# Patient Record
Sex: MALE | Race: WHITE | NOT HISPANIC OR LATINO | Employment: OTHER | URBAN - METROPOLITAN AREA
[De-identification: names, ages, dates, MRNs, and addresses within clinical notes are randomized per-mention and may not be internally consistent; named-entity substitution may affect disease eponyms.]

---

## 2017-01-03 ENCOUNTER — TELEPHONE (OUTPATIENT)
Dept: PAIN MEDICINE | Facility: CLINIC | Age: 72
End: 2017-01-03

## 2017-01-03 ENCOUNTER — OFFICE VISIT (OUTPATIENT)
Dept: PAIN MEDICINE | Facility: CLINIC | Age: 72
End: 2017-01-03
Payer: MEDICARE

## 2017-01-03 VITALS
HEART RATE: 56 BPM | TEMPERATURE: 98 F | DIASTOLIC BLOOD PRESSURE: 72 MMHG | SYSTOLIC BLOOD PRESSURE: 122 MMHG | WEIGHT: 130.94 LBS | HEIGHT: 72 IN | BODY MASS INDEX: 17.73 KG/M2 | RESPIRATION RATE: 17 BRPM

## 2017-01-03 DIAGNOSIS — F11.20 OPIOID TYPE DEPENDENCE, CONTINUOUS USE: Primary | ICD-10-CM

## 2017-01-03 PROCEDURE — 1125F AMNT PAIN NOTED PAIN PRSNT: CPT | Mod: S$GLB,,, | Performed by: PSYCHIATRY & NEUROLOGY

## 2017-01-03 PROCEDURE — 1159F MED LIST DOCD IN RCRD: CPT | Mod: S$GLB,,, | Performed by: PSYCHIATRY & NEUROLOGY

## 2017-01-03 PROCEDURE — 1157F ADVNC CARE PLAN IN RCRD: CPT | Mod: S$GLB,,, | Performed by: PSYCHIATRY & NEUROLOGY

## 2017-01-03 PROCEDURE — 99213 OFFICE O/P EST LOW 20 MIN: CPT | Mod: S$GLB,,, | Performed by: PSYCHIATRY & NEUROLOGY

## 2017-01-03 PROCEDURE — 1160F RVW MEDS BY RX/DR IN RCRD: CPT | Mod: S$GLB,,, | Performed by: PSYCHIATRY & NEUROLOGY

## 2017-01-03 PROCEDURE — 99999 PR PBB SHADOW E&M-EST. PATIENT-LVL III: CPT | Mod: PBBFAC,,, | Performed by: PSYCHIATRY & NEUROLOGY

## 2017-01-03 RX ORDER — ALPRAZOLAM 2 MG/1
2 TABLET ORAL 2 TIMES DAILY PRN
Qty: 60 TABLET | Refills: 0 | Status: SHIPPED | OUTPATIENT
Start: 2017-01-03 | End: 2017-02-01 | Stop reason: SDUPTHER

## 2017-01-03 RX ORDER — OXYCODONE HYDROCHLORIDE 30 MG/1
30 TABLET ORAL
Qty: 75 TABLET | Refills: 0 | Status: SHIPPED | OUTPATIENT
Start: 2017-01-03 | End: 2017-02-01 | Stop reason: SDUPTHER

## 2017-01-03 NOTE — MR AVS SNAPSHOT
[facility]  [address]  [facility] [address]  Phone: [phone]  Fax: [phone]                  Griffin Hall   1/3/2017 11:20 AM   Office Visit    Description:  Male : 1945   Provider:  Demetria Browne MD   Department:  [facility]           Reason for Visit     Follow-up                To Do List           Future Appointments        Provider Department Dept Phone    2017 11:40 AM Demetria Browne MD [facility] [phone]      Goals (5 Years of Data)     None       These Medications        Disp Refills Start End    oxycodone (ROXICODONE) 30 MG Tab 75 tablet 0 1/3/2017     Take 1 tablet (30 mg total) by mouth after meals as needed. - Oral    Pharmacy: [address] Ph #: [phone]       alprazolam (XANAX) 2 MG Tab 60 tablet 0 1/3/2017     Take 1 tablet (2 mg total) by mouth 2 (two) times daily as needed. - Oral    Pharmacy: [address] Ph #: [phone]         [facility] On Call     [facility] On Call Nurse Care Line -  Assistance  Registered nurses in the [facility] On Call Center provide clinical advisement, health education, appointment booking, and other advisory services.  Call for this free service at [phone].             Medications           Message regarding Medications     Verify the changes and/or additions to your medication regime listed below are the same as discussed with your clinician today.  If any of these changes or additions are incorrect, please notify your healthcare provider.             Verify that the below list of medications is an accurate representation of the medications you are currently taking.  If none reported, the list may be blank. If incorrect, please contact your healthcare provider. Carry this list with you in case of emergency.           Current Medications     albuterol  (VENTOLIN HFA) 90 mcg/actuation inhaler Inhale 2 puffs into the lungs every 6 (six) hours as needed for Wheezing.    alprazolam (XANAX) 2 MG Tab Take 1 tablet (2 mg total) by mouth 2 (two) times daily as needed.    aspirin (ECOTRIN) 81 MG EC tablet Take 1 tablet (81 mg total) by mouth once daily.    atorvastatin (LIPITOR) 20 MG tablet Take 1 tablet (20 mg total) by mouth once daily.    benazepril (LOTENSIN) 5 MG tablet Take 1 tablet (5 mg total) by mouth once daily.    buPROPion (WELLBUTRIN SR) 200 MG TbSR     clopidogrel (PLAVIX) 75 mg tablet Take 1 tablet (75 mg total) by mouth once daily.    fluticasone (FLONASE) 50 mcg/actuation nasal spray     furosemide (LASIX) 20 MG tablet Take 1 tablet (20 mg total) by mouth once daily.    gabapentin (NEURONTIN) 300 MG capsule Take 1 pill at before bed x 7 days, followed by 1 pill in morning and night for 7 days, followed by 1 pill morning afternoon and night    metoprolol succinate (TOPROL-XL) 50 MG 24 hr tablet Take 1 tablet (50 mg total) by mouth once daily.    oxycodone (ROXICODONE) 30 MG Tab Take 1 tablet (30 mg total) by mouth after meals as needed.           Clinical Reference Information           Vital Signs - Last Recorded  Most recent update: 1/3/2017 12:43 PM by Andres Stanley MA    BP Pulse Temp Resp Ht Wt    122/72 (!) 56 98.2 °F (36.8 °C) (Oral) 17 6' (1.829 m) 59.4 kg (130 lb 15.3 oz)    BMI                17.76 kg/m2          Blood Pressure          Most Recent Value    BP  122/72      Allergies as of 1/3/2017     Codeine      Immunizations Administered on Date of Encounter - 1/3/2017     None      Smoking Cessation     If you would like to quit smoking:   You may be eligible for free services if you are a [place] resident and started smoking cigarettes before September 1, 1988.  Call the Smoking Cessation Trust (SCT) toll free at (283) 750-1834 or (325) 310-5664.   Call 2-800-QUIT-NOW if you do not meet the above criteria.

## 2017-01-03 NOTE — TELEPHONE ENCOUNTER
----- Message from Juan Bolivar Jr. sent at 12/30/2016  8:54 AM CST -----  X_  1st Request  _  2nd Request  _  3rd Request    Please refill the medication(s) listed below. Please call the patient when the prescription(s) is ready for  at the phone number [phone]    Medication #1 oxycodone (ROXICODONE) 30 MG Tab    Medication #2     alprazolam (XANAX) 2 MG Tab 60 tablet 0 12/1/2016  No  Sig - Route: Take 1 tablet (2 mg total) by mouth 2 (two) times daily as needed. - Oral          Preferred Pharmacy:    [facility] [address] [place] [address]

## 2017-01-03 NOTE — PROGRESS NOTES
Outpatient Psychiatry Follow-Up Visit (MD/NP)    1/3/2017    Clinical Status of Patient:  Outpatient (Ambulatory)    Chief Complaint:  Griffin Hall is a 71 y.o. male who presents today for follow-up of opioid dependence.  Met with patient.      Interval History and Content of Current Session:  Interim Events/Subjective Report/Content of Current Session: Pt reports that he has been having a lot more pain lately because he ran out of his medications as he did not keep his last appointment with me. That he has been trying to decrease the dose and taper it as we discuss and though it has been difficult he has been able to do it. Talked about other medications options foe the withdrawals but pt does not want to change any thing else at this time.     Psychotherapy:  · Target symptoms: opioid dependence  · Why chosen therapy is appropriate versus another modality: relevant to diagnosis, patient responds to this modality  · Outcome monitoring methods: self-report, observation  · Therapeutic intervention type: behavior modifying psychotherapy, supportive psychotherapy  · Topics discussed/themes: stress related to medical comorbidities, difficulty managing affect in interpersonal relationships, building skills sets for symptom management, symptom recognition  · The patient's response to the intervention is accepting. The patient's progress toward treatment goals is not progressing.   · Duration of intervention: 30 minutes.    Review of Systems   · PSYCHIATRIC: Pertinant items are noted in the narrative.    Past Medical, Family and Social History: The patient's past medical, family and social history have been reviewed and updated as appropriate within the electronic medical record - see encounter notes.    Compliance: yes    Side effects: None    Risk Parameters:  Patient reports no suicidal ideation  Patient reports no homicidal ideation  Patient reports no self-injurious behavior  Patient reports no violent  behavior    Exam (detailed: at least 9 elements; comprehensive: all 15 elements)   Constitutional  Vitals:  Most recent vital signs, dated less than 90 days prior to this appointment, were reviewed.   Vitals:    01/03/17 1236   BP: 122/72   Pulse: (!) 56   Resp: 17   Temp: 98.2 °F (36.8 °C)   TempSrc: Oral   Height: 6' (1.829 m)        General:  age appropriate, casually dressed     Musculoskeletal  Muscle Strength/Tone:  no tremor, no tic   Gait & Station:  non-ataxic     Psychiatric  Speech:  no latency; no press   Mood & Affect:  anxious  congruent and appropriate   Thought Process:  normal and logical, goal-directed   Associations:  intact   Thought Content:  normal, no suicidality, no homicidality, delusions, or paranoia   Insight:  intact   Judgement: behavior is adequate to circumstances   Orientation:  grossly intact   Memory: intact for content of interview   Language: grossly intact   Attention Span & Concentration:  able to focus   Fund of Knowledge:  intact and appropriate to age and level of education     Assessment and Diagnosis   Status/Progress: Based on the examination today, the patient's problem(s) is/are inadequately controlled.  New problems have been presented today.   Co-morbidities are complicating management of the primary condition.  There are no active rule-out diagnoses for this patient at this time.       Impression: Pt is a 70 Y/O CM with PMHx sig for H/O head and neck cancer, chronic pain of both shoulders, cervical stenosis of spinal cord with         Opioid dependence in a controlled environment.  Benzodiazepine dependence  Unspecified anxiety, R/O BETTYE           Strengths and Liabilities: Strength: Patient accepts guidance/feedback, Strength: Patient is expressive/articulate., Strength: Patient is intelligent., Strength: Patient is motivated for change., Strength: Patient has positive support network., Strength: Patient has reasonable judgment.     Treatment Goals: Specify outcomes  written in observable, behavioral terms:   Anxiety: acquiring relapse prevention skills, eliminating all anxiety symptoms (SCL-90-R scores in normal range), reducing negative automatic thoughts, reducing physical symptoms of anxiety and reducing time spent worrying (<30 minutes/day)  Safety: to take medications only as prescribed by his MDs     Treatment Plan/Recommendations:   · Medication Management: Continue current medications. The risks and benefits of medication were discussed with the patient.  · Referral for further treatment to social work team for psychotherapy  · The treatment plan and follow up plan were reviewed with the patient.   · Pt reports that he has a very supportive family.  · That he will cont to f/u with Dr Mcmillan  · Will cont the Roxicodone 30 mg upto tid prn pain, will decrease quantity to# 75  · Cont the xanax 2 mg upto twice daily, pt reports it also helps as a muscle relaxant.  · Will cont to monitor  · Reviewed LA .  · Will cont to f/u with Dr Mcmillan.  · Discussed chronic pain rehabilitation.  · Discussed coping skills.   · Had UDS done recently.      Return to Clinic: 1 month

## 2017-01-31 ENCOUNTER — TELEPHONE (OUTPATIENT)
Dept: PAIN MEDICINE | Facility: CLINIC | Age: 72
End: 2017-01-31

## 2017-01-31 NOTE — TELEPHONE ENCOUNTER
Called and spoke with patient today, he states he's out of his medication and need to see Dr. Browne.  Mr. Bellk, schedule follow up appointment with Dr. Browne for tomorrow 2/1/17.

## 2017-02-01 ENCOUNTER — OFFICE VISIT (OUTPATIENT)
Dept: PAIN MEDICINE | Facility: CLINIC | Age: 72
End: 2017-02-01
Payer: MEDICARE

## 2017-02-01 VITALS
WEIGHT: 130.94 LBS | HEIGHT: 71 IN | TEMPERATURE: 98 F | BODY MASS INDEX: 18.33 KG/M2 | RESPIRATION RATE: 17 BRPM | DIASTOLIC BLOOD PRESSURE: 80 MMHG | SYSTOLIC BLOOD PRESSURE: 130 MMHG | HEART RATE: 59 BPM

## 2017-02-01 DIAGNOSIS — F11.20 OPIOID TYPE DEPENDENCE, CONTINUOUS USE: Primary | ICD-10-CM

## 2017-02-01 PROCEDURE — 1157F ADVNC CARE PLAN IN RCRD: CPT | Mod: S$GLB,,, | Performed by: PSYCHIATRY & NEUROLOGY

## 2017-02-01 PROCEDURE — 99999 PR PBB SHADOW E&M-EST. PATIENT-LVL III: CPT | Mod: PBBFAC,,, | Performed by: PSYCHIATRY & NEUROLOGY

## 2017-02-01 PROCEDURE — 1125F AMNT PAIN NOTED PAIN PRSNT: CPT | Mod: S$GLB,,, | Performed by: PSYCHIATRY & NEUROLOGY

## 2017-02-01 PROCEDURE — 1160F RVW MEDS BY RX/DR IN RCRD: CPT | Mod: S$GLB,,, | Performed by: PSYCHIATRY & NEUROLOGY

## 2017-02-01 PROCEDURE — 1159F MED LIST DOCD IN RCRD: CPT | Mod: S$GLB,,, | Performed by: PSYCHIATRY & NEUROLOGY

## 2017-02-01 PROCEDURE — 99213 OFFICE O/P EST LOW 20 MIN: CPT | Mod: S$GLB,,, | Performed by: PSYCHIATRY & NEUROLOGY

## 2017-02-01 RX ORDER — ALPRAZOLAM 2 MG/1
2 TABLET ORAL 2 TIMES DAILY PRN
Qty: 60 TABLET | Refills: 0 | Status: SHIPPED | OUTPATIENT
Start: 2017-02-01 | End: 2017-03-01 | Stop reason: SDUPTHER

## 2017-02-01 RX ORDER — OXYCODONE HYDROCHLORIDE 30 MG/1
30 TABLET ORAL
Qty: 75 TABLET | Refills: 0 | Status: SHIPPED | OUTPATIENT
Start: 2017-02-01 | End: 2017-03-01 | Stop reason: SDUPTHER

## 2017-02-01 NOTE — PROGRESS NOTES
Outpatient Psychiatry Follow-Up Visit (MD/NP)    2/1/2017    Clinical Status of Patient:  Outpatient (Ambulatory)    Chief Complaint:  Griffin Hall is a 71 y.o. male who presents today for follow-up of anxiety and opioid and benzodiazepine dependence.  Met with patient.      Interval History and Content of Current Session:  Interim Events/Subjective Report/Content of Current Session: Pt reports that he has been having a lot of pain on his neck and shoulders and that he had an MRI done recently. I have talked to him about making an appointment  with  Dr Mcmillan again to discuss pain management options. Pt reports that he has been taking his pain medications as prescribed. That with the increase in pain it has been difficult for him to not take more but he has managed. I will continue to get collateral from his daughter as needed.    Psychotherapy:  · Target symptoms: opioid dependence  · Why chosen therapy is appropriate versus another modality: relevant to diagnosis, patient responds to this modality  · Outcome monitoring methods: self-report, observation, checklist/rating scale  · Therapeutic intervention type: behavior modifying psychotherapy, supportive psychotherapy  · Topics discussed/themes: stress related to medical comorbidities, difficulty managing affect in interpersonal relationships, building skills sets for symptom management, symptom recognition  · The patient's response to the intervention is accepting. The patient's progress toward treatment goals is fair.   · Duration of intervention: 30 minutes.    Review of Systems   · PSYCHIATRIC: Pertinant items are noted in the narrative.    Past Medical, Family and Social History: The patient's past medical, family and social history have been reviewed and updated as appropriate within the electronic medical record - see encounter notes.    Compliance: yes    Side effects: None    Risk Parameters:  Patient reports no suicidal ideation  Patient reports no  "homicidal ideation  Patient reports no self-injurious behavior  Patient reports no violent behavior    Exam (detailed: at least 9 elements; comprehensive: all 15 elements)   Constitutional  Vitals:  Most recent vital signs, dated less than 90 days prior to this appointment, were reviewed.   Vitals:    02/01/17 1212   BP: 130/80   Pulse: (!) 59   Resp: 17   Temp: 97.9 °F (36.6 °C)   TempSrc: Oral   Weight: 59.4 kg (130 lb 15.3 oz)   Height: 5' 11" (1.803 m)        General:  age appropriate, casually dressed, neatly groomed     Musculoskeletal  Muscle Strength/Tone:  no tremor, no tic   Gait & Station:  non-ataxic     Psychiatric  Speech:  no latency; no press   Mood & Affect:  " in a lot of pain"  congruent and appropriate   Thought Process:  normal and logical, goal-directed   Associations:  intact   Thought Content:  normal, no suicidality, no homicidality, delusions, or paranoia   Insight:  intact   Judgement: behavior is adequate to circumstances   Orientation:  grossly intact   Memory: intact for content of interview   Language: grossly intact   Attention Span & Concentration:  able to focus   Fund of Knowledge:  intact and appropriate to age and level of education     Assessment and Diagnosis   Status/Progress: Based on the examination today, the patient's problem(s) is/are inadequately controlled.  New problems have been presented today.   Co-morbidities are complicating management of the primary condition.  There are no active rule-out diagnoses for this patient at this time.            Impression: Pt is a 72 Y/O CM with PMHx sig for H/O head and neck cancer, chronic pain of both shoulders, cervical stenosis of spinal cord with           Opioid dependence in a controlled environment.  Benzodiazepine dependence  Unspecified anxiety, R/O BETTYE              Strengths and Liabilities: Strength: Patient accepts guidance/feedback, Strength: Patient is expressive/articulate., Strength: Patient is intelligent., " Strength: Patient is motivated for change., Strength: Patient has positive support network., Strength: Patient has reasonable judgment.      Treatment Goals: Specify outcomes written in observable, behavioral terms:   Anxiety: acquiring relapse prevention skills, eliminating all anxiety symptoms (SCL-90-R scores in normal range), reducing negative automatic thoughts, reducing physical symptoms of anxiety and reducing time spent worrying (<30 minutes/day)  Safety: to take medications only as prescribed by his MDs      Treatment Plan/Recommendations:   · Medication Management: Continue current medications. The risks and benefits of medication were discussed with the patient.  · Referral for further treatment to social work team for psychotherapy  · The treatment plan and follow up plan were reviewed with the patient.   · Pt reports that he has a very supportive family.  · That he will cont to f/u with Dr Mcmillan  · Will cont the Roxicodone 30 mg upto tid prn pain, will decrease quantity to# 75  · Cont the xanax 2 mg upto twice daily, pt reports it also helps as a muscle relaxant.  · Will cont to monitor  · Reviewed LA .  · Will cont to f/u with Dr Mcmillan.  · Discussed chronic pain rehabilitation.  · Discussed coping skills.         Return to Clinic: 1 month

## 2017-02-01 NOTE — MR AVS SNAPSHOT
[facility]  [address]  [facility] [address]  Phone: [phone]  Fax: [phone]                  Griffin Hall   2017 11:20 AM   Office Visit    Description:  Male : 1945   Provider:  Demetria Browne MD   Department:  [facility]           Reason for Visit     Follow-up                To Do List           Future Appointments        Provider Department Dept Phone    2017 10:30 AM Mavis Mcmillan MD [facility] [phone]    3/1/2017 11:20 AM Demetria Browne MD [facility] [phone]      Goals (5 Years of Data)     None       These Medications        Disp Refills Start End    oxycodone (ROXICODONE) 30 MG Tab 75 tablet 0 2017     Take 1 tablet (30 mg total) by mouth after meals as needed. - Oral    Pharmacy: [address] Ph #: [phone]       alprazolam (XANAX) 2 MG Tab 60 tablet 0 2017     Take 1 tablet (2 mg total) by mouth 2 (two) times daily as needed. - Oral    Pharmacy: [address] Ph #: [phone]         [facility] On Call     [facility] On Call Nurse Care Line -  Assistance  Registered nurses in the [facility] On Call Center provide clinical advisement, health education, appointment booking, and other advisory services.  Call for this free service at [phone].             Medications           Message regarding Medications     Verify the changes and/or additions to your medication regime listed below are the same as discussed with your clinician today.  If any of these changes or additions are incorrect, please notify your healthcare provider.             Verify that the below list of medications is an accurate representation of the medications you are currently taking.  If none reported, the list may be blank. If incorrect, please contact your healthcare provider. Carry this  "list with you in case of emergency.           Current Medications     albuterol (VENTOLIN HFA) 90 mcg/actuation inhaler Inhale 2 puffs into the lungs every 6 (six) hours as needed for Wheezing.    alprazolam (XANAX) 2 MG Tab Take 1 tablet (2 mg total) by mouth 2 (two) times daily as needed.    aspirin (ECOTRIN) 81 MG EC tablet Take 1 tablet (81 mg total) by mouth once daily.    atorvastatin (LIPITOR) 20 MG tablet Take 1 tablet (20 mg total) by mouth once daily.    benazepril (LOTENSIN) 5 MG tablet Take 1 tablet (5 mg total) by mouth once daily.    buPROPion (WELLBUTRIN SR) 200 MG TbSR     clopidogrel (PLAVIX) 75 mg tablet Take 1 tablet (75 mg total) by mouth once daily.    fluticasone (FLONASE) 50 mcg/actuation nasal spray     furosemide (LASIX) 20 MG tablet Take 1 tablet (20 mg total) by mouth once daily.    gabapentin (NEURONTIN) 300 MG capsule Take 1 pill at before bed x 7 days, followed by 1 pill in morning and night for 7 days, followed by 1 pill morning afternoon and night    metoprolol succinate (TOPROL-XL) 50 MG 24 hr tablet Take 1 tablet (50 mg total) by mouth once daily.    oxycodone (ROXICODONE) 30 MG Tab Take 1 tablet (30 mg total) by mouth after meals as needed.           Clinical Reference Information           Vital Signs - Last Recorded  Most recent update: 2/1/2017 12:12 PM by Andres Stanley MA    BP Pulse Temp Resp Ht Wt    130/80 (!) 59 97.9 °F (36.6 °C) (Oral) 17 5' 11" (1.803 m) 59.4 kg (130 lb 15.3 oz)    BMI                18.26 kg/m2          Blood Pressure          Most Recent Value    BP  130/80      Allergies as of 2/1/2017     Codeine      Immunizations Administered on Date of Encounter - 2/1/2017     None      [facility] Sign-Up     Activating your [facility] account is as easy as 1-2-3!     1) Visit [email or web address], select Sign Up Now, enter this activation code and your date of birth, then select Next.  Activation code not generated  Current Patient Portal Status: Account disabled      2) " Create a username and password to use when you visit [facility] in the future and select a security question in case you lose your password and select Next.    3) Enter your e-mail address and click Sign Up!    Additional Information  If you have questions, please e-mail [email or web address] or call [phone] to talk to our [facility] staff. Remember, [facility] is NOT to be used for urgent needs. For medical emergencies, dial 911.         Smoking Cessation     If you would like to quit smoking:   You may be eligible for free services if you are a [place] resident and started smoking cigarettes before September 1, 1988.  Call the Smoking Cessation Trust (SCT) toll free at (151) 117-1142 or (687) 681-4690.   Call 4-491-QUIT-NOW if you do not meet the above criteria.

## 2017-02-02 ENCOUNTER — OFFICE VISIT (OUTPATIENT)
Dept: PAIN MEDICINE | Facility: CLINIC | Age: 72
End: 2017-02-02
Attending: ANESTHESIOLOGY
Payer: MEDICARE

## 2017-02-02 VITALS
DIASTOLIC BLOOD PRESSURE: 87 MMHG | HEIGHT: 71 IN | WEIGHT: 139.13 LBS | HEART RATE: 69 BPM | RESPIRATION RATE: 18 BRPM | TEMPERATURE: 98 F | SYSTOLIC BLOOD PRESSURE: 143 MMHG | BODY MASS INDEX: 19.48 KG/M2

## 2017-02-02 DIAGNOSIS — G89.29 CHRONIC PAIN OF BOTH SHOULDERS: ICD-10-CM

## 2017-02-02 DIAGNOSIS — F11.20 OPIOID TYPE DEPENDENCE, CONTINUOUS USE: ICD-10-CM

## 2017-02-02 DIAGNOSIS — M25.511 CHRONIC PAIN OF BOTH SHOULDERS: ICD-10-CM

## 2017-02-02 DIAGNOSIS — M48.02 CERVICAL STENOSIS OF SPINAL CANAL: Primary | ICD-10-CM

## 2017-02-02 DIAGNOSIS — M54.12 CERVICAL RADICULOPATHY: ICD-10-CM

## 2017-02-02 DIAGNOSIS — M25.512 CHRONIC PAIN OF BOTH SHOULDERS: ICD-10-CM

## 2017-02-02 PROCEDURE — 1125F AMNT PAIN NOTED PAIN PRSNT: CPT | Mod: S$GLB,,, | Performed by: ANESTHESIOLOGY

## 2017-02-02 PROCEDURE — 1157F ADVNC CARE PLAN IN RCRD: CPT | Mod: S$GLB,,, | Performed by: ANESTHESIOLOGY

## 2017-02-02 PROCEDURE — 99499 UNLISTED E&M SERVICE: CPT | Mod: S$PBB,,, | Performed by: ANESTHESIOLOGY

## 2017-02-02 PROCEDURE — 99999 PR PBB SHADOW E&M-EST. PATIENT-LVL III: CPT | Mod: PBBFAC,,, | Performed by: ANESTHESIOLOGY

## 2017-02-02 PROCEDURE — 1159F MED LIST DOCD IN RCRD: CPT | Mod: S$GLB,,, | Performed by: ANESTHESIOLOGY

## 2017-02-02 PROCEDURE — 99213 OFFICE O/P EST LOW 20 MIN: CPT | Mod: S$GLB,,, | Performed by: ANESTHESIOLOGY

## 2017-02-02 PROCEDURE — 1160F RVW MEDS BY RX/DR IN RCRD: CPT | Mod: S$GLB,,, | Performed by: ANESTHESIOLOGY

## 2017-02-02 RX ORDER — CARISOPRODOL 350 MG/1
TABLET ORAL
Refills: 0 | COMMUNITY
Start: 2017-01-11 | End: 2018-05-25

## 2017-02-02 RX ORDER — PREGABALIN 50 MG/1
CAPSULE ORAL
Qty: 90 CAPSULE | Refills: 6 | Status: SHIPPED | OUTPATIENT
Start: 2017-02-02 | End: 2017-10-24 | Stop reason: ALTCHOICE

## 2017-02-02 RX ORDER — METHYLPREDNISOLONE 4 MG/1
TABLET ORAL
COMMUNITY
Start: 2017-01-30 | End: 2017-04-18 | Stop reason: ALTCHOICE

## 2017-02-02 NOTE — PROGRESS NOTES
Chronic Pain - Follow Up     Referring Physician: Ayden Almonte MD     Chief Complaint: No chief complaint on file.         SUBJECTIVE: Disclaimer: This note has been generated using voice-recognition software. There may be typographical errors that have been missed during proof-reading  Interval History 2/2/2017:  Continues to f/u with Dr. Browne, had an injury and had MRI at OSH, but results not available today, but copies were given to Dr. Browne, waiting for them to be scanned into the system.   Gabapentin caused dizziness so this was discontinued before an effective dose could be found.  Has not tried Lyrica in the past.  Interval history 11/18/2016:  Since previous encounter the patient has been receiving oxycodone 30 mg tablets 3-4 per day from multiple different physicians, he has not had any interventions or any other adjuvant medications added to his pain regimen.  Additionally the patient has had a recent MI in June with a drug-eluting stent placed and is now on Plavix.  He continues to endorse neck pain with bilateral upper extremity radicular symptoms is his worst pain.  He has had a previous ACDF and has a CT scan of the cervical spine which shows moderate neuroforaminal stenosis at C4-5 which would correspond to his shoulder pain bilaterally.  He has not been evaluated by orthopedics.   he previously did not follow my recommendations and is still not under psychiatric treatment.    Initial encounter:     Griffin Hall presents to the clinic for the evaluation of neck pain. The pain started 15 years ago following cervical fusion and symptoms have been worsening.     Brief history: Patient reports a history squamous carcinoma of the neck in 2006 which was resected in [facility] he states that he received 6 weeks of radiation treatment prior to surgical resection but did not have any chemotherapy treatments and reports that he has not been in follow-up by oncology. he  "states that he has been getting all of his treatment from his family practitioner Dr. Barry Keene. He states that Dr. Saunders is retiring and sent him to a new practitioner although he does not recall the name of the new practitioner and he had one encounter with the practitioner who referred him to pain management to resume his opioid prescriptions. The patient has been taking oxycodone 30 mg tablets he states 3-4 per day and was being given on average 130 tablets per month. His last prescription was provided according to  on 5/26/2015, and he states that he is out of his medication. He could not account for the reason why he is out early although he offered multiple explanations "sometimes you lose some, sometimes you step on some, I'm not sure what happened in this case, but I assure you I am not a drug addict"      Pain Description:     The pain is located in the neck upper back area and radiates to the across the chest.      At BEST 5/10      At WORST  7/10 on the WORST day.      On average pain is rated as 4/10.      Today the pain is rated as 5/10     The pain is described as aching, burning, numbing, sharp, shooting, tight band and deep       Symptoms interfere with daily activity, sleeping and work.      Exacerbating factors: Standing, Bending, Touching, Coughing/Sneezing, Eating, Walking, Night Time, Morning, Extension, Flexing and Lifting.      Mitigating factors heat, laying down, medications, rest and medications and injections (although he did not specify which injections he has had in the past).      Patient denies urinary incontinence and bowel incontinence. Patient has been having decreased appetite treated with Megace     Patient denies any suicidal or homicidal ideations     Pain Medications:  Current:  Oxycodone 30mg 3-4 /day     Tried in Past:  NSAIDs -Never  TCA -Never  SNRI -Never  Anti-convulsants -gabapentin causes dizziness.  Muscle Relaxants -Never  Opioids-Never     Physical " Therapy/Home Exercise: no       report: Reviewed and consistent with medication use as prescribed.     Pain Procedures: ?     Chiropractor -never  Acupuncture - never  TENS unit -never  Spinal decompression -never  Joint replacement -never    CT cervical spine 6/16/2016:  Cervical spine CT without contrast:    Results: 2.5-mm axial images of the cervical spine were obtained without intravenous contrast.  Coronal and sagittal reconstructions were generated. Comparison of CT cervical spine 9/2/11.    Generalized osteopenia.  Prior ACDF hardware at C5-C6, without evidence of hardware or loosening.  There is slight levocurvature and straightening of the cervical lordosis similar to prior.  Chronic mild anterior wedge deformity of T1-T3.  No displaced fracture, dislocation or significant listhesis.  No prevertebral soft tissue swelling or paraspinal hematoma.    There is multi-level degenerative disc disease and uncovertebral and facet arthrosismost prominent at C4-5 and C6-7 levels.    C2-3: Posterior central disc protrusion resulting in mild acquired canal stenosis.  No significant neuroforaminal narrowing.  C3-4: Posterior disk osteophyte complex resulting in mild acquired canal stenosis.  Mild bilateral neuroforaminal narrowing, right greater than left.  C4-5: Posterior disc osteophyte complex asymmetric to the right resulting in mild acquired canal stenosis.  Moderate bilateral neural foraminal narrowing.  C5-6: Posterior disc osteophyte complex asymmetric to the right resulting in mild acquired canal stenosis.  Moderate bilateral neural foraminal narrowing.  C6-7: Posterior disc osteophyte complex resulting in minimal acquired canal stenosis.  Moderate right and mild left neuroforaminal narrowing.  C7-T1:   No significant spinal canal stenosis or neural foraminal narrowing.    The visualized lung apices show biapical pleural parenchymal scarring and paraseptal emphysematous change without evidence for  "pneumothorax. Skull base vascular calcifications are noted.  Atherosclerotic calcifications are noted at the bilateral carotid bifurcations and proximal ICAs.  The remainder of the soft tissue structures incidentally surveyed in this noncontrast cervical spine CT demonstrates no significant findings.   Impression        1.  No acute cervical spine abnormality.    2.  Cervical spondylosis as detailed above.           Social hx, family hx, pmhx reviewed     Allergies not on file     No current outpatient prescriptions on file.      No current facility-administered medications for this visit.         REVIEW OF SYSTEMS:     GENERAL:  Weight loss and decreased appetite  HEENT:  No recent changes in vision or hearing  NECK:  difficulty with swallowing.  RESPIRATORY: Negative for cough, wheezing or shortness of breath, patient denies any recent URI.  CARDIOVASCULAR: Negative for chest pain, leg swelling or palpitations.  GI: Negative for abdominal discomfort, blood in stools or black stools or change in bowel habits.  MUSCULOSKELETAL: See HPI.  SKIN: Negative for lesions, rash, and itching.  PSYCH:  Depression and anxiety treated with citalopram, Wellbutrin, alprazolam, no psychiatrist.  Patients sleep is disturbed secondary to pain.  HEMATOLOGY/LYMPHOLOGY: Negative for prolonged bleeding, bruising easily or swollen nodes. Patient is taking plavix for NINA associated with MI  ENDO: No history of diabetes or thyroid dysfunction  NEURO: No history of headaches, syncope, paralysis, seizures or tremors.  All other reviewed and negative other than HPI.     OBJECTIVE:    Visit Vitals    BP (!) 143/87    Pulse 69    Temp 97.8 °F (36.6 °C) (Oral)    Resp 18    Ht 5' 11" (1.803 m)    Wt 63.1 kg (139 lb 1.8 oz)    BMI 19.4 kg/m2       PHYSICAL EXAMINATION:    GENERAL: Well appearing, in no acute distress, alert and oriented x3.  PSYCH:  Mood and affect appropriate.  SKIN: Skin color, texture, turgor normal, no rashes or " lesions.  HEAD/FACE:  Normocephalic, atraumatic. Cranial nerves grossly intact.  NECK: Pain to palpation over the cervical paraspinous muscles. Spurling positive. Pain with neck flexion, extension, and lateral flexion, limited range of motion in the neck.   CV: RRR with palpation of the radial artery.  PULM: No evidence of respiratory difficulty, symmetric chest rise.  EXTREMITIES: Peripheral joint ROM is full and pain free without obvious instability or laxity in all four extremities. No deformities, edema, or skin discoloration. Good capillary refill.  MUSCULOSKELETAL: Shoulder provocative maneuvers are cause pain.   Bilateral upper extremity strength is normal and symmetric.  No atrophy or tone abnormalities are noted.  NEURO: Bilateral upper extremity coordination and muscle stretch reflexes are physiologic and symmetric.  Darren's negative. No clonus.  No loss of sensation is noted.  GAIT: Antalgic, ambulates without assistance      ASSESSMENT: 70 y.o. year old male with pain, consistent with      Encounter Diagnoses   Name Primary?    Cervical stenosis of spinal canal Yes    Cervical radiculopathy     Chronic pain of both shoulders     Opioid type dependence, continuous use       PLAN:        Continue opioid wean with Dr. Browne, continue f/u with Dr. Browne    D/C gabapentin    A prescription for Lyrica was provided, Take 1 pill at nightx 7 days, followed by 1 pill in morning and night for 7 days, followed by 1 pill morning afternoon and night     Continue topical compounded pain cream     F/u with cardiology regarding need for Plavix, has a drug eluting stent from MI in June    Once the patient is safely able to discontinue Plavix we will strongly encourage treatment with intervention and discontinuation of opioids     Will review recent imaging when put into EPIC system.     Greater than 25 minutes spent in total in todays visit with the patient, with more than half that time direct face to face  counseling and education with the patient today. We discussed the disease process, prognosis, treatment plan, and risks and benefits.      Mavis Mcmillan 02/02/2017

## 2017-02-02 NOTE — MR AVS SNAPSHOT
[facility]  [address]  [facility] [address]  Phone: [phone]  Fax: [phone]                  Griffin Hall   2017 10:30 AM   Office Visit    Description:  Male : 1945   Provider:  Mavis Mcmillan MD   Department:  [facility]           Reason for Visit     Neck Pain           Diagnoses this Visit        Comments    Cervical stenosis of spinal canal    -  Primary     Cervical radiculopathy         Chronic pain of both shoulders         Opioid type dependence, continuous use                To Do List           Future Appointments        Provider Department Dept Phone    3/1/2017 11:20 AM Demetria Browne MD [facility] [phone]    3/16/2017 1:30 PM Janis Loera NP [facility] [phone]      Goals (5 Years of Data)     None       These Medications        Disp Refills Start End    pregabalin (LYRICA) 50 MG capsule 90 capsule 6 2017     Take 1 pill at nightx 7 days, followed by 1 pill in morning and night for 7 days, followed by 1 pill morning afternoon and night    Pharmacy: [address] Ph #: [phone]         [facility] On Call     [facility] On Call Nurse Care Line -  Assistance  Registered nurses in the [facility] On Call Center provide clinical advisement, health education, appointment booking, and other advisory services.  Call for this free service at [phone].             Medications           Message regarding Medications     Verify the changes and/or additions to your medication regime listed below are the same as discussed with your clinician today.  If any of these changes or additions are incorrect, please notify your healthcare provider.        START taking these NEW medications        Refills    pregabalin (LYRICA) 50 MG capsule 6    Sig: Take 1 pill at nightx 7 days, followed by 1 pill in morning and night for 7 days, followed by  "1 pill morning afternoon and night    Class: Normal           Verify that the below list of medications is an accurate representation of the medications you are currently taking.  If none reported, the list may be blank. If incorrect, please contact your healthcare provider. Carry this list with you in case of emergency.           Current Medications     albuterol (VENTOLIN HFA) 90 mcg/actuation inhaler Inhale 2 puffs into the lungs every 6 (six) hours as needed for Wheezing.    alprazolam (XANAX) 2 MG Tab Take 1 tablet (2 mg total) by mouth 2 (two) times daily as needed.    aspirin (ECOTRIN) 81 MG EC tablet Take 1 tablet (81 mg total) by mouth once daily.    atorvastatin (LIPITOR) 20 MG tablet Take 1 tablet (20 mg total) by mouth once daily.    benazepril (LOTENSIN) 5 MG tablet Take 1 tablet (5 mg total) by mouth once daily.    buPROPion (WELLBUTRIN SR) 200 MG TbSR     carisoprodol (SOMA) 350 MG tablet     clopidogrel (PLAVIX) 75 mg tablet Take 1 tablet (75 mg total) by mouth once daily.    fluticasone (FLONASE) 50 mcg/actuation nasal spray     furosemide (LASIX) 20 MG tablet Take 1 tablet (20 mg total) by mouth once daily.    gabapentin (NEURONTIN) 300 MG capsule Take 1 pill at before bed x 7 days, followed by 1 pill in morning and night for 7 days, followed by 1 pill morning afternoon and night    methylPREDNISolone (MEDROL DOSEPACK) 4 mg tablet     metoprolol succinate (TOPROL-XL) 50 MG 24 hr tablet Take 1 tablet (50 mg total) by mouth once daily.    oxycodone (ROXICODONE) 30 MG Tab Take 1 tablet (30 mg total) by mouth after meals as needed.    pregabalin (LYRICA) 50 MG capsule Take 1 pill at nightx 7 days, followed by 1 pill in morning and night for 7 days, followed by 1 pill morning afternoon and night           Clinical Reference Information           Your Vitals Were     BP Pulse Temp Resp Height Weight    143/87 69 97.8 °F (36.6 °C) (Oral) 18 5' 11" (1.803 m) 63.1 kg (139 lb 1.8 oz)    BMI                " 19.4 kg/m2          Blood Pressure          Most Recent Value    BP  (!)  143/87      Allergies as of 2/2/2017     Codeine      Immunizations Administered on Date of Encounter - 2/2/2017     None      [facility] Sign-Up     Activating your [facility] account is as easy as 1-2-3!     1) Visit [email or web address], select Sign Up Now, enter this activation code and your date of birth, then select Next.  Activation code not generated  Current Patient Portal Status: Account disabled      2) Create a username and password to use when you visit [facility] in the future and select a security question in case you lose your password and select Next.    3) Enter your e-mail address and click Sign Up!    Additional Information  If you have questions, please e-mail [email or web address] or call [phone] to talk to our [facility] staff. Remember, [facility] is NOT to be used for urgent needs. For medical emergencies, dial 911.         Smoking Cessation     If you would like to quit smoking:   You may be eligible for free services if you are a [place] resident and started smoking cigarettes before September 1, 1988.  Call the Smoking Cessation Trust ([facility]) toll free at (659) 979-3727 or (197) 398-4995.   Call 2-836-QUIT-NOW if you do not meet the above criteria.            Language Assistance Services     ATTENTION: Language assistance services are available, free of charge. Please call [phone].      ATENCIÓN: Si habla español, tiene a rocha disposición servicios gratuitos de asistencia lingüística. Llame al [phone].     CHÚ Ý: N?u b?n nói Ti?ng Vi?t, có các d?ch v? h? tr? ngôn ng? mi?n phí dành cho b?n. G?i s? [phone].         [facility] complies with applicable Federal civil rights laws and does not discriminate on the basis of race, color, national origin, age, disability, or sex.

## 2017-03-01 ENCOUNTER — OFFICE VISIT (OUTPATIENT)
Dept: PAIN MEDICINE | Facility: CLINIC | Age: 72
End: 2017-03-01
Payer: MEDICARE

## 2017-03-01 VITALS
BODY MASS INDEX: 18.22 KG/M2 | TEMPERATURE: 98 F | HEIGHT: 72 IN | SYSTOLIC BLOOD PRESSURE: 111 MMHG | RESPIRATION RATE: 18 BRPM | WEIGHT: 134.5 LBS | DIASTOLIC BLOOD PRESSURE: 68 MMHG | HEART RATE: 67 BPM

## 2017-03-01 DIAGNOSIS — F11.20 OPIOID TYPE DEPENDENCE, CONTINUOUS USE: Primary | ICD-10-CM

## 2017-03-01 DIAGNOSIS — Z79.891 ENCOUNTER FOR LONG-TERM OPIATE ANALGESIC USE: Primary | ICD-10-CM

## 2017-03-01 DIAGNOSIS — Z79.891 ENCOUNTER FOR LONG-TERM OPIATE ANALGESIC USE: ICD-10-CM

## 2017-03-01 PROCEDURE — 99999 PR PBB SHADOW E&M-EST. PATIENT-LVL III: CPT | Mod: PBBFAC,,, | Performed by: PSYCHIATRY & NEUROLOGY

## 2017-03-01 PROCEDURE — 99213 OFFICE O/P EST LOW 20 MIN: CPT | Mod: PBBFAC | Performed by: PSYCHIATRY & NEUROLOGY

## 2017-03-01 PROCEDURE — 80307 DRUG TEST PRSMV CHEM ANLYZR: CPT

## 2017-03-01 PROCEDURE — 99213 OFFICE O/P EST LOW 20 MIN: CPT | Mod: S$PBB,,, | Performed by: PSYCHIATRY & NEUROLOGY

## 2017-03-01 RX ORDER — ALPRAZOLAM 2 MG/1
2 TABLET ORAL 2 TIMES DAILY PRN
Qty: 60 TABLET | Refills: 0 | Status: SHIPPED | OUTPATIENT
Start: 2017-03-01 | End: 2017-04-05 | Stop reason: SDUPTHER

## 2017-03-01 RX ORDER — OXYCODONE HYDROCHLORIDE 30 MG/1
30 TABLET ORAL
Qty: 90 TABLET | Refills: 0 | Status: SHIPPED | OUTPATIENT
Start: 2017-03-01 | End: 2017-03-30 | Stop reason: SDUPTHER

## 2017-03-01 NOTE — MR AVS SNAPSHOT
[facility]  [address]  [place] [address]  Phone: [phone]  Fax: [phone]                  Griffin Hall   3/1/2017 11:20 AM   Office Visit    Description:  Male : 1945   Provider:  Demetria Browne MD   Department:  [facility]           Reason for Visit     Follow-up                To Do List           Future Appointments        Provider Department Dept Phone    3/16/2017 1:30 PM Janis Loera NP [facility] [phone]    4/3/2017 11:20 AM Demetria Browne MD [facility] [phone]      Goals (5 Years of Data)     None       These Medications        Disp Refills Start End    oxycodone (ROXICODONE) 30 MG Tab 90 tablet 0 3/1/2017     Take 1 tablet (30 mg total) by mouth after meals as needed. - Oral    Pharmacy: [address] Ph #: [phone]       alprazolam (XANAX) 2 MG Tab 60 tablet 0 3/1/2017     Take 1 tablet (2 mg total) by mouth 2 (two) times daily as needed. - Oral    Pharmacy: [address] Ph #: [phone]         [facility] On Call     [facility] On Call Nurse Care Line -  Assistance  Registered nurses in the [facility] On Call Center provide clinical advisement, health education, appointment booking, and other advisory services.  Call for this free service at [phone].             Medications           Message regarding Medications     Verify the changes and/or additions to your medication regime listed below are the same as discussed with your clinician today.  If any of these changes or additions are incorrect, please notify your healthcare provider.             Verify that the below list of medications is an accurate representation of the medications you are currently taking.  If none reported, the list may be blank. If incorrect, please contact your healthcare provider. Carry this  list with you in case of emergency.           Current Medications     albuterol (VENTOLIN HFA) 90 mcg/actuation inhaler Inhale 2 puffs into the lungs every 6 (six) hours as needed for Wheezing.    alprazolam (XANAX) 2 MG Tab Take 1 tablet (2 mg total) by mouth 2 (two) times daily as needed.    aspirin (ECOTRIN) 81 MG EC tablet Take 1 tablet (81 mg total) by mouth once daily.    atorvastatin (LIPITOR) 20 MG tablet Take 1 tablet (20 mg total) by mouth once daily.    benazepril (LOTENSIN) 5 MG tablet Take 1 tablet (5 mg total) by mouth once daily.    buPROPion (WELLBUTRIN SR) 200 MG TbSR     carisoprodol (SOMA) 350 MG tablet     clopidogrel (PLAVIX) 75 mg tablet Take 1 tablet (75 mg total) by mouth once daily.    fluticasone (FLONASE) 50 mcg/actuation nasal spray     furosemide (LASIX) 20 MG tablet Take 1 tablet (20 mg total) by mouth once daily.    gabapentin (NEURONTIN) 300 MG capsule Take 1 pill at before bed x 7 days, followed by 1 pill in morning and night for 7 days, followed by 1 pill morning afternoon and night    methylPREDNISolone (MEDROL DOSEPACK) 4 mg tablet     metoprolol succinate (TOPROL-XL) 50 MG 24 hr tablet Take 1 tablet (50 mg total) by mouth once daily.    oxycodone (ROXICODONE) 30 MG Tab Take 1 tablet (30 mg total) by mouth after meals as needed.    pregabalin (LYRICA) 50 MG capsule Take 1 pill at nightx 7 days, followed by 1 pill in morning and night for 7 days, followed by 1 pill morning afternoon and night           Clinical Reference Information           Your Vitals Were     BP                   111/68           Blood Pressure          Most Recent Value    BP  111/68      Allergies as of 3/1/2017     Codeine      Immunizations Administered on Date of Encounter - 3/1/2017     None      [facility] Sign-Up     Activating your [facility] account is as easy as 1-2-3!     1) Visit [email or web address], select Sign Up Now, enter this activation code and your date of birth, then select Next.  Activation code  not generated  Current Patient Portal Status: Account disabled      2) Create a username and password to use when you visit [facility] in the future and select a security question in case you lose your password and select Next.    3) Enter your e-mail address and click Sign Up!    Additional Information  If you have questions, please e-mail [email or web address] or call [phone] to talk to our [facility] staff. Remember, [facility] is NOT to be used for urgent needs. For medical emergencies, dial 911.         Smoking Cessation     If you would like to quit smoking:   You may be eligible for free services if you are a [place] resident and started smoking cigarettes before September 1, 1988.  Call the Smoking Cessation Trust ([facility]) toll free at ([phone] 143-2638 or (222) 426-8064.   Call -706-QUIT-NOW if you do not meet the above criteria.            Language Assistance Services     ATTENTION: Language assistance services are available, free of charge. Please call [phone].      ATENCIÓN: Si habla español, tiene a rocha disposición servicios gratuitos de asistencia lingüística. Llame al [phone].     CHÚ Ý: N?u b?n nói Ti?ng Vi?t, có các d?ch v? h? tr? ngôn ng? mi?n phí dành cho b?n. G?i s? [phone].         [facility] complies with applicable Federal civil rights laws and does not discriminate on the basis of race, color, national origin, age, disability, or sex.

## 2017-03-01 NOTE — PROGRESS NOTES
"Outpatient Psychiatry Follow-Up Visit (MD/NP)    3/1/2017    Clinical Status of Patient:  Outpatient (Ambulatory)    Chief Complaint:  Griffin Hall is a 71 y.o. male who presents today for follow-up of anxiety and opioid and benzodiazepine dependence.  Met with patient.      Interval History and Content of Current Session:  Interim Events/Subjective Report/Content of Current Session: Pt reports that he is seeing Dr Marilyn Alfonso an Orthopedic surgeon, who he reports will send in an order for a PET Scan . That he is worried about the results of the Pet Scan , " I might have cancer again" but also does not want to worry his family about it so is waiting till he gets the results before he lets his family know what is going on. That he has many friends in [place] and they have been a good source of support to him. That he is still trying to stay busy. Wants to celebrate his birthday with his friends in [place] so might be making the trip there. No concerns at this time of abuse of his medications. Reviewed LA , he got a prescription for adderall.     Psychotherapy:  · Target symptoms: opioid dependence, benzodiazepine dependence  · Why chosen therapy is appropriate versus another modality: relevant to diagnosis, patient responds to this modality  · Outcome monitoring methods: self-report, observation, checklist/rating scale  · Therapeutic intervention type: behavior modifying psychotherapy, supportive psychotherapy  · Topics discussed/themes: stress related to medical comorbidities, difficulty managing affect in interpersonal relationships, building skills sets for symptom management, symptom recognition  · The patient's response to the intervention is accepting. The patient's progress toward treatment goals is fair.   · Duration of intervention: 30 minutes.    Review of Systems   · PSYCHIATRIC: Pertinant items are noted in the narrative.    Past Medical, Family and Social History: The patient's past medical, family " "and social history have been reviewed and updated as appropriate within the electronic medical record - see encounter notes.    Compliance: yes    Side effects: None    Risk Parameters:  Patient reports no suicidal ideation  Patient reports no homicidal ideation  Patient reports no self-injurious behavior  Patient reports no violent behavior    Exam (detailed: at least 9 elements; comprehensive: all 15 elements)   Constitutional  Vitals:  Most recent vital signs, dated less than 90 days prior to this appointment, were reviewed.   Vitals:    03/01/17 1145   BP: 111/68   Pulse: 67   Resp: 18   Temp: 97.8 °F (36.6 °C)   TempSrc: Oral   Weight: 61 kg (134 lb 7.7 oz)   Height: 6' (1.829 m)        General:  age appropriate, casually dressed, neatly groomed     Musculoskeletal  Muscle Strength/Tone:  no tremor, no tic   Gait & Station:  non-ataxic     Psychiatric  Speech:  no latency; no press   Mood & Affect:  " worried about Pet Scan"  congruent and appropriate   Thought Process:  normal and logical, goal-directed   Associations:  intact   Thought Content:  normal, no suicidality, no homicidality, delusions, or paranoia   Insight:  intact   Judgement: behavior is adequate to circumstances   Orientation:  grossly intact   Memory: intact for content of interview   Language: grossly intact   Attention Span & Concentration:  able to focus   Fund of Knowledge:  intact and appropriate to age and level of education     Assessment and Diagnosis   Status/Progress: Based on the examination today, the patient's problem(s) is/are inadequately controlled.  New problems have been presented today.   Co-morbidities are complicating management of the primary condition.  There are no active rule-out diagnoses for this patient at this time.            Impression: Pt is a 70 Y/O CM with PMHx sig for H/O head and neck cancer, chronic pain of both shoulders, cervical stenosis of spinal cord with           Opioid dependence in a controlled " environment.  Benzodiazepine dependence  Unspecified anxiety, R/O BETTYE              Strengths and Liabilities: Strength: Patient accepts guidance/feedback, Strength: Patient is expressive/articulate., Strength: Patient is intelligent., Strength: Patient is motivated for change., Strength: Patient has positive support network., Strength: Patient has reasonable judgment.      Treatment Goals: Specify outcomes written in observable, behavioral terms:   Anxiety: acquiring relapse prevention skills, eliminating all anxiety symptoms (SCL-90-R scores in normal range), reducing negative automatic thoughts, reducing physical symptoms of anxiety and reducing time spent worrying (<30 minutes/day)  Safety: to take medications only as prescribed by his MDs      Treatment Plan/Recommendations:   · Medication Management: Continue current medications. The risks and benefits of medication were discussed with the patient.  · Referral for further treatment to social work team for psychotherapy  · The treatment plan and follow up plan were reviewed with the patient.   · Pt reports that he has a very supportive family.  · That he will cont to f/u with Dr Mcmillan  · Will cont the Roxicodone 30 mg upto tid prn pain, will decrease quantity to# 90 Not able to taper at this time. Will cont to assess.   · Cont the xanax 2 mg upto twice daily, pt reports it also helps as a muscle relaxant.  · Will cont to monitor  · Reviewed LA .  · Discussed chronic pain rehabilitation.  · Discussed coping skills.         Return to Clinic: 1 month

## 2017-03-14 LAB
6MAM UR QL: NOT DETECTED
7AMINOCLONAZEPAM UR QL: NOT DETECTED
A-OH ALPRAZ UR QL: PRESENT
ALPRAZ UR QL: PRESENT
AMPHET UR QL SCN: NOT DETECTED
ANNOTATION COMMENT IMP: NORMAL
ANNOTATION COMMENT IMP: NORMAL
BARBITURATES UR QL: NOT DETECTED
BUPRENORPHINE UR QL: NOT DETECTED
BZE UR QL: NOT DETECTED
CARBOXYTHC UR QL: NOT DETECTED
CARISOPRODOL UR QL: PRESENT
CLONAZEPAM UR QL: NOT DETECTED
CODEINE UR QL: NOT DETECTED
CREAT UR-MCNC: 92.2 MG/DL (ref 20–400)
DIAZEPAM UR QL: NOT DETECTED
ETHYL GLUCURONIDE UR QL: NOT DETECTED
FENTANYL UR QL: NOT DETECTED
HYDROCODONE UR QL: NOT DETECTED
HYDROMORPHONE UR QL: NOT DETECTED
LORAZEPAM UR QL: NOT DETECTED
MDA UR QL: NOT DETECTED
MDEA UR QL: NOT DETECTED
MDMA UR QL: NOT DETECTED
MEPERIDINE UR QL: NOT DETECTED
METHADONE UR QL: NOT DETECTED
METHAMPHET UR QL: NOT DETECTED
MIDAZOLAM UR QL SCN: NOT DETECTED
MORPHINE UR QL: NOT DETECTED
NORBUPRENORPHINE UR QL CFM: NOT DETECTED
NORDIAZEPAM UR QL: NOT DETECTED
NORFENTANYL UR QL: NOT DETECTED
NORHYDROCODONE UR QL CFM: NOT DETECTED
NOROXYCODONE UR QL CFM: PRESENT
OXAZEPAM UR QL: NOT DETECTED
OXYCODONE UR QL: PRESENT
OXYMORPHONE UR QL: PRESENT
OXYMORPHONE UR QL: PRESENT
PATHOLOGY STUDY: NORMAL
PCP UR QL: NOT DETECTED
PHENTERMINE UR QL: NOT DETECTED
PPAA UR QL: NOT DETECTED
PROPOXYPH UR QL: NOT DETECTED
SERVICE CMNT-IMP: NORMAL
TAPENTADOL UR QL SCN: NOT DETECTED
TAPENTADOL UR QL SCN: NOT DETECTED
TEMAZEPAM UR QL: NOT DETECTED
TRAMADOL UR QL: NOT DETECTED
ZOLPIDEM UR QL: NOT DETECTED

## 2017-03-30 RX ORDER — OXYCODONE HYDROCHLORIDE 30 MG/1
30 TABLET ORAL
Qty: 90 TABLET | Refills: 0 | Status: SHIPPED | OUTPATIENT
Start: 2017-03-30 | End: 2017-04-05 | Stop reason: SDUPTHER

## 2017-04-04 ENCOUNTER — TELEPHONE (OUTPATIENT)
Dept: PAIN MEDICINE | Facility: CLINIC | Age: 72
End: 2017-04-04

## 2017-04-04 NOTE — TELEPHONE ENCOUNTER
----- Message from Lisette Maguire sent at 4/4/2017  2:37 PM CDT -----  x_  1st Request  _  2nd Request  _  3rd Request    Please refill the medication(s) listed below. Please call the patient when the prescription(s) is ready for  at the phone number [phone]    Medication #1    alprazolam (XANAX) 2 MG Tab 60 tablet 0 3/1/2017  No  Sig - Route: Take 1 tablet (2 mg total) by mouth 2 (two) times daily as needed. - Oral  Class: Print  Order: [id number]        Preferred Pharmacy:

## 2017-04-04 NOTE — TELEPHONE ENCOUNTER
Called and spoke with pt today about rescheduling appointment with Dr. Browne. Mr Isabel, states he's out of alprazolam (XANAX) 2 MG Tab and need to follow up with Dr. Browne.  Schedule appt on 4/5/17.

## 2017-04-05 ENCOUNTER — OFFICE VISIT (OUTPATIENT)
Dept: PAIN MEDICINE | Facility: CLINIC | Age: 72
End: 2017-04-05
Payer: MEDICARE

## 2017-04-05 VITALS
SYSTOLIC BLOOD PRESSURE: 116 MMHG | BODY MASS INDEX: 18.69 KG/M2 | TEMPERATURE: 98 F | HEART RATE: 70 BPM | RESPIRATION RATE: 18 BRPM | DIASTOLIC BLOOD PRESSURE: 70 MMHG | HEIGHT: 72 IN | WEIGHT: 138 LBS

## 2017-04-05 DIAGNOSIS — F11.20 OPIOID TYPE DEPENDENCE, CONTINUOUS USE: Primary | ICD-10-CM

## 2017-04-05 PROCEDURE — 99213 OFFICE O/P EST LOW 20 MIN: CPT | Mod: PBBFAC | Performed by: PSYCHIATRY & NEUROLOGY

## 2017-04-05 PROCEDURE — 99999 PR PBB SHADOW E&M-EST. PATIENT-LVL III: CPT | Mod: PBBFAC,,, | Performed by: PSYCHIATRY & NEUROLOGY

## 2017-04-05 PROCEDURE — 99213 OFFICE O/P EST LOW 20 MIN: CPT | Mod: S$PBB,,, | Performed by: PSYCHIATRY & NEUROLOGY

## 2017-04-05 RX ORDER — ALPRAZOLAM 2 MG/1
2 TABLET ORAL 2 TIMES DAILY PRN
Qty: 60 TABLET | Refills: 0 | Status: SHIPPED | OUTPATIENT
Start: 2017-04-05 | End: 2017-05-04 | Stop reason: SDUPTHER

## 2017-04-05 RX ORDER — OXYCODONE HYDROCHLORIDE 30 MG/1
30 TABLET ORAL
Qty: 90 TABLET | Refills: 0 | Status: SHIPPED | OUTPATIENT
Start: 2017-04-05 | End: 2017-04-05 | Stop reason: SDUPTHER

## 2017-04-05 RX ORDER — OXYCODONE HYDROCHLORIDE 30 MG/1
30 TABLET ORAL
Qty: 90 TABLET | Refills: 0 | Status: SHIPPED | OUTPATIENT
Start: 2017-04-05 | End: 2017-05-29 | Stop reason: SDUPTHER

## 2017-04-05 NOTE — MR AVS SNAPSHOT
[facility]  [address]  [facility] [address]  Phone: [phone]  Fax: [phone]                  Griffin Hall   2017 12:00 PM   Office Visit    Description:  Male : 1945   Provider:  Demetria Browne MD   Department:  [facility]           Reason for Visit     Follow-up                To Do List           Future Appointments        Provider Department Dept Phone    2017 11:00 AM Demetria Browne MD [facility] [phone]      Goals (5 Years of Data)     None       These Medications        Disp Refills Start End    alprazolam (XANAX) 2 MG Tab 60 tablet 0 2017     Take 1 tablet (2 mg total) by mouth 2 (two) times daily as needed. - Oral    Pharmacy: [address] Ph #: [phone]       oxycodone (ROXICODONE) 30 MG Tab 90 tablet 0 2017     Take 1 tablet (30 mg total) by mouth after meals as needed. To be filled on 17 - Oral    Pharmacy: [address] Ph #: [phone]         [facility]     [facility] On Call Nurse Care Line -  Assistance  Unless otherwise directed by your provider, please contact [facility], our nurse care line that is available for  assistance.     Registered nurses in the [facility] provide: appointment scheduling, clinical advisement, health education, and other advisory services.  Call: [phone] (toll free)               Medications           Message regarding Medications     Verify the changes and/or additions to your medication regime listed below are the same as discussed with your clinician today.  If any of these changes or additions are incorrect, please notify your healthcare provider.        CHANGE how you are taking these medications     Start Taking Instead of    oxycodone (ROXICODONE) 30 MG Tab oxycodone (ROXICODONE) 30 MG Tab    Dosage:   Take 1 tablet (30 mg total) by mouth after meals as needed. To be filled on 4/30/17 Dosage:  Take 1 tablet (30 mg total) by mouth after meals as needed.    Reason for Change:  Reorder            Verify that the below list of medications is an accurate representation of the medications you are currently taking.  If none reported, the list may be blank. If incorrect, please contact your healthcare provider. Carry this list with you in case of emergency.           Current Medications     albuterol (VENTOLIN HFA) 90 mcg/actuation inhaler Inhale 2 puffs into the lungs every 6 (six) hours as needed for Wheezing.    alprazolam (XANAX) 2 MG Tab Take 1 tablet (2 mg total) by mouth 2 (two) times daily as needed.    aspirin (ECOTRIN) 81 MG EC tablet Take 1 tablet (81 mg total) by mouth once daily.    atorvastatin (LIPITOR) 20 MG tablet Take 1 tablet (20 mg total) by mouth once daily.    benazepril (LOTENSIN) 5 MG tablet Take 1 tablet (5 mg total) by mouth once daily.    buPROPion (WELLBUTRIN SR) 200 MG TbSR     carisoprodol (SOMA) 350 MG tablet     clopidogrel (PLAVIX) 75 mg tablet TAKE 1 TABLET BY MOUTH EVERY DAY    fluticasone (FLONASE) 50 mcg/actuation nasal spray     furosemide (LASIX) 20 MG tablet Take 1 tablet (20 mg total) by mouth once daily.    gabapentin (NEURONTIN) 300 MG capsule Take 1 pill at before bed x 7 days, followed by 1 pill in morning and night for 7 days, followed by 1 pill morning afternoon and night    methylPREDNISolone (MEDROL DOSEPACK) 4 mg tablet     metoprolol succinate (TOPROL-XL) 50 MG 24 hr tablet Take 1 tablet (50 mg total) by mouth once daily.    oxycodone (ROXICODONE) 30 MG Tab Take 1 tablet (30 mg total) by mouth after meals as needed. To be filled on 4/30/17    pregabalin (LYRICA) 50 MG capsule Take 1 pill at nightx 7 days, followed by 1 pill in morning and night for 7 days, followed by 1 pill morning afternoon and night           Clinical Reference Information           Your Vitals Were      BP Pulse Temp Resp Height Weight    116/70 70 98.4 °F (36.9 °C) (Oral) 18 6' (1.829 m) 62.6 kg (138 lb 0.1 oz)    BMI                18.72 kg/m2          Blood Pressure          Most Recent Value    BP  116/70      Allergies as of 4/5/2017     Codeine      Immunizations Administered on Date of Encounter - 4/5/2017     None      [facility] Sign-Up     Activating your [facility] account is as easy as 1-2-3!     1) Visit [email or web address], select Sign Up Now, enter this activation code and your date of birth, then select Next.  Activation code not generated  Current Patient Portal Status: Account disabled      2) Create a username and password to use when you visit [facility] in the future and select a security question in case you lose your password and select Next.    3) Enter your e-mail address and click Sign Up!    Additional Information  If you have questions, please e-mail [email or web address] or call [phone] to talk to our [facility] staff. Remember, [facility] is NOT to be used for urgent needs. For medical emergencies, dial 911.         Smoking Cessation     If you would like to quit smoking:   You may be eligible for free services if you are a [place] resident and started smoking cigarettes before September 1, 1988.  Call the Smoking Cessation Trust ([facility]) toll free at ([phone] 320-9026 or (539) [phone].   Call 1-800-QUIT-NOW if you do not meet the above criteria.   Contact us via email: [email or web address]   View our website for more information: [email or web address]        Language Assistance Services     ATTENTION: Language assistance services are available, free of charge. Please call [phone].      ATENCIÓN: Si habla español, tiene a rocha disposición servicios gratuitos de asistencia lingüística. Llame al [phone].     CHÚ Ý: N?u b?n nói Ti?ng Vi?t, có các d?ch v? h? tr? ngôn ng? mi?n phí dành cho b?n. G?i s? [phone].         [facility] complies with  applicable Federal civil rights laws and does not discriminate on the basis of race, color, national origin, age, disability, or sex.

## 2017-04-05 NOTE — PROGRESS NOTES
Outpatient Psychiatry Follow-Up Visit (MD/NP)    4/5/2017    Clinical Status of Patient:  Outpatient (Ambulatory)    Chief Complaint:  Griffin Hall is a 72 y.o. male who presents today for follow-up of anxiety and opioid and benzodiazepine dependence.  Met with patient.      Interval History and Content of Current Session:  Interim Events/Subjective Report/Content of Current Session: Pt reports that he is  Still taking his medications asprescribed. His daughter was in the appointment with today. She rpeorts that her father has been having issues with chronic pain for many hyears and that he has been on the pain medications for that long. That she has not had any concerns for him that hr is taking more of the medications etc. That he has been compliant and she has not had any reasons to think otherwise. Pt rpeorts that he is still n a lot of pain and would like to see another MD in pain Management.     Psychotherapy:  · Target symptoms: opioid dependence, benzodiazepine dependence  · Why chosen therapy is appropriate versus another modality: relevant to diagnosis, patient responds to this modality  · Outcome monitoring methods: self-report, observation, checklist/rating scale  · Therapeutic intervention type: behavior modifying psychotherapy, supportive psychotherapy  · Topics discussed/themes: stress related to medical comorbidities, difficulty managing affect in interpersonal relationships, building skills sets for symptom management, symptom recognition  · The patient's response to the intervention is accepting. The patient's progress toward treatment goals is fair.   · Duration of intervention: 30 minutes.    Review of Systems   · PSYCHIATRIC: Pertinant items are noted in the narrative.    Past Medical, Family and Social History: The patient's past medical, family and social history have been reviewed and updated as appropriate within the electronic medical record - see encounter notes.    Compliance: yes    Side  "effects: None    Risk Parameters:  Patient reports no suicidal ideation  Patient reports no homicidal ideation  Patient reports no self-injurious behavior  Patient reports no violent behavior    Exam (detailed: at least 9 elements; comprehensive: all 15 elements)   Constitutional  Vitals:  Most recent vital signs, dated less than 90 days prior to this appointment, were reviewed.   Vitals:    04/05/17 1224   BP: 116/70   Pulse: 70   Resp: 18   Temp: 98.4 °F (36.9 °C)   TempSrc: Oral   Weight: 62.6 kg (138 lb 0.1 oz)   Height: 6' (1.829 m)        General:  age appropriate, casually dressed, neatly groomed     Musculoskeletal  Muscle Strength/Tone:  no tremor, no tic   Gait & Station:  non-ataxic     Psychiatric  Speech:  no latency; no press   Mood & Affect:  " worried about Pet Scan"  congruent and appropriate   Thought Process:  normal and logical, goal-directed   Associations:  intact   Thought Content:  normal, no suicidality, no homicidality, delusions, or paranoia   Insight:  intact   Judgement: behavior is adequate to circumstances   Orientation:  grossly intact   Memory: intact for content of interview   Language: grossly intact   Attention Span & Concentration:  able to focus   Fund of Knowledge:  intact and appropriate to age and level of education     Assessment and Diagnosis   Status/Progress: Based on the examination today, the patient's problem(s) is/are inadequately controlled.  New problems have been presented today.   Co-morbidities are complicating management of the primary condition.  There are no active rule-out diagnoses for this patient at this time.            Impression: Pt is a 70 Y/O CM with PMHx sig for H/O head and neck cancer, chronic pain of both shoulders, cervical stenosis of spinal cord with           Opioid dependence in a controlled environment.  Benzodiazepine dependence  Unspecified anxiety, R/O BETTYE              Strengths and Liabilities: Strength: Patient accepts " guidance/feedback, Strength: Patient is expressive/articulate., Strength: Patient is intelligent., Strength: Patient is motivated for change., Strength: Patient has positive support network., Strength: Patient has reasonable judgment.      Treatment Goals: Specify outcomes written in observable, behavioral terms:   Anxiety: acquiring relapse prevention skills, eliminating all anxiety symptoms (SCL-90-R scores in normal range), reducing negative automatic thoughts, reducing physical symptoms of anxiety and reducing time spent worrying (<30 minutes/day)  Safety: to take medications only as prescribed by his MDs      Treatment Plan/Recommendations:   · Medication Management: Continue current medications. The risks and benefits of medication were discussed with the patient.  · Referral for further treatment to social work team for psychotherapy  · The treatment plan and follow up plan were reviewed with the patient.   · Pt reports that he has a very supportive family.  · That he will cont to f/u with Dr Mcmillan  · Will cont the Roxicodone 30 mg upto tid prn pain, will decrease quantity to# 90 Not able to taper at this time. Will cont to assess.   · Cont the xanax 2 mg upto twice daily, pt reports it also helps as a muscle relaxant.  · Will cont to monitor  · Reviewed LA .  · Discussed chronic pain rehabilitation.  · Discussed coping skills.         Return to Clinic:2 months

## 2017-04-10 ENCOUNTER — TELEPHONE (OUTPATIENT)
Dept: FAMILY MEDICINE | Facility: CLINIC | Age: 72
End: 2017-04-10

## 2017-04-10 NOTE — TELEPHONE ENCOUNTER
Left message that  is out of the office till 04/19/17 that is as soon as she could see him. If he needs a pre-op sooner advised to schedule to MELISSA Carrillo or another provider in this practice.

## 2017-04-10 NOTE — TELEPHONE ENCOUNTER
----- Message from Janessa Kent sent at 4/10/2017 12:48 PM CDT -----  Contact: Patient  Type: Sooner appointment than  is able to schedule    When is the first available appointment?  4/19/17    What is the nature of the appointment? Preop for cataract surgery    What appointment type: Preop    Comments: Please call the patient at [phone].    Thanks!

## 2017-04-18 ENCOUNTER — HOSPITAL ENCOUNTER (OUTPATIENT)
Dept: RADIOLOGY | Facility: HOSPITAL | Age: 72
Discharge: HOME | End: 2017-04-18
Attending: NURSE PRACTITIONER
Payer: MEDICARE

## 2017-04-18 ENCOUNTER — OFFICE VISIT (OUTPATIENT)
Dept: FAMILY MEDICINE | Facility: CLINIC | Age: 72
End: 2017-04-18
Payer: MEDICARE

## 2017-04-18 VITALS
DIASTOLIC BLOOD PRESSURE: 68 MMHG | HEART RATE: 64 BPM | WEIGHT: 139.13 LBS | BODY MASS INDEX: 19.48 KG/M2 | TEMPERATURE: 98 F | SYSTOLIC BLOOD PRESSURE: 114 MMHG | HEIGHT: 71 IN

## 2017-04-18 DIAGNOSIS — I25.2 STATUS POST NON-ST ELEVATION MYOCARDIAL INFARCTION (NSTEMI): ICD-10-CM

## 2017-04-18 DIAGNOSIS — I50.9 HEART FAILURE, UNSPECIFIED HEART FAILURE CHRONICITY, UNSPECIFIED HEART FAILURE TYPE: ICD-10-CM

## 2017-04-18 DIAGNOSIS — Z95.5 S/P DRUG ELUTING CORONARY STENT PLACEMENT: ICD-10-CM

## 2017-04-18 DIAGNOSIS — Z01.818 PRE-OP EVALUATION: ICD-10-CM

## 2017-04-18 DIAGNOSIS — Z79.02 PLATELET INHIBITION DUE TO PLAVIX: ICD-10-CM

## 2017-04-18 DIAGNOSIS — Z72.0 TOBACCO USE: ICD-10-CM

## 2017-04-18 DIAGNOSIS — Z91.199 NON-COMPLIANCE: ICD-10-CM

## 2017-04-18 DIAGNOSIS — I50.22 CHRONIC SYSTOLIC HEART FAILURE: ICD-10-CM

## 2017-04-18 DIAGNOSIS — Z01.818 PRE-OP EVALUATION: Primary | ICD-10-CM

## 2017-04-18 PROCEDURE — 71020 XR CHEST PA AND LATERAL: CPT | Mod: TC,PO

## 2017-04-18 PROCEDURE — 99214 OFFICE O/P EST MOD 30 MIN: CPT | Mod: S$PBB,,, | Performed by: NURSE PRACTITIONER

## 2017-04-18 PROCEDURE — 99999 PR PBB SHADOW E&M-EST. PATIENT-LVL V: CPT | Mod: PBBFAC,,, | Performed by: NURSE PRACTITIONER

## 2017-04-18 PROCEDURE — 93010 ELECTROCARDIOGRAM REPORT: CPT | Mod: ,,, | Performed by: INTERNAL MEDICINE

## 2017-04-18 PROCEDURE — 99499 UNLISTED E&M SERVICE: CPT | Mod: S$PBB,,, | Performed by: NURSE PRACTITIONER

## 2017-04-18 PROCEDURE — 71020 XR CHEST PA AND LATERAL: CPT | Mod: 26,,, | Performed by: RADIOLOGY

## 2017-04-18 RX ORDER — FUROSEMIDE 20 MG/1
20 TABLET ORAL DAILY
Qty: 30 TABLET | Refills: 11 | Status: SHIPPED | OUTPATIENT
Start: 2017-04-18 | End: 2017-06-22 | Stop reason: SDUPTHER

## 2017-04-18 RX ORDER — DEXTROAMPHETAMINE SACCHARATE, AMPHETAMINE ASPARTATE, DEXTROAMPHETAMINE SULFATE AND AMPHETAMINE SULFATE 2.5; 2.5; 2.5; 2.5 MG/1; MG/1; MG/1; MG/1
TABLET ORAL DAILY
Refills: 0 | COMMUNITY
Start: 2017-02-24 | End: 2018-01-16

## 2017-04-18 NOTE — MR AVS SNAPSHOT
[facility]  [address], [address]  [facility] [address]  Phone: [phone]  Fax: [phone]                  Griffin Hall   2017 10:40 AM   Office Visit    Description:  Male : 1945   Provider:  SABA Marrero   Department:  [facility]           Reason for Visit     Pre-op Exam     Medication Refill           Diagnoses this Visit        Comments    Pre-op evaluation    -  Primary     Status post non-ST elevation myocardial infarction (NSTEMI)         S/P drug eluting coronary stent placement         Heart failure, unspecified heart failure chronicity, unspecified heart failure type         Platelet inhibition due to Plavix         Chronic systolic heart failure         Tobacco use         Non-compliance                To Do List           Future Appointments        Provider Department Dept Phone    2017 11:00 AM Demetria Browne MD [facility] - Pain Management [phone]      Goals (5 Years of Data)     None       These Medications        Disp Refills Start End    furosemide (LASIX) 20 MG tablet 30 tablet 11 2017    Take 1 tablet (20 mg total) by mouth once daily. - Oral    Pharmacy: [address] Ph #: [phone]         [facility]     [facility] On Call Nurse Care Line -  Assistance  Unless otherwise directed by your provider, please contact [facility], our nurse care line that is available for  assistance.     Registered nurses in the [facility] Center provide: appointment scheduling, clinical advisement, health education, and other advisory services.  Call: [phone] (toll free)               Medications           Message regarding Medications     Verify the changes and/or additions to your medication regime listed below are the same as discussed with your clinician today.  If any of these changes or additions are incorrect,  please notify your healthcare provider.        STOP taking these medications     methylPREDNISolone (MEDROL DOSEPACK) 4 mg tablet            Verify that the below list of medications is an accurate representation of the medications you are currently taking.  If none reported, the list may be blank. If incorrect, please contact your healthcare provider. Carry this list with you in case of emergency.           Current Medications     albuterol (VENTOLIN HFA) 90 mcg/actuation inhaler Inhale 2 puffs into the lungs every 6 (six) hours as needed for Wheezing.    alprazolam (XANAX) 2 MG Tab Take 1 tablet (2 mg total) by mouth 2 (two) times daily as needed.    aspirin (ECOTRIN) 81 MG EC tablet Take 1 tablet (81 mg total) by mouth once daily.    atorvastatin (LIPITOR) 20 MG tablet Take 1 tablet (20 mg total) by mouth once daily.    benazepril (LOTENSIN) 5 MG tablet Take 1 tablet (5 mg total) by mouth once daily.    buPROPion (WELLBUTRIN SR) 200 MG TbSR     carisoprodol (SOMA) 350 MG tablet     clopidogrel (PLAVIX) 75 mg tablet TAKE 1 TABLET BY MOUTH EVERY DAY    dextroamphetamine-amphetamine 10 mg Tab once daily.    fluticasone (FLONASE) 50 mcg/actuation nasal spray     furosemide (LASIX) 20 MG tablet Take 1 tablet (20 mg total) by mouth once daily.    gabapentin (NEURONTIN) 300 MG capsule Take 1 pill at before bed x 7 days, followed by 1 pill in morning and night for 7 days, followed by 1 pill morning afternoon and night    metoprolol succinate (TOPROL-XL) 50 MG 24 hr tablet Take 1 tablet (50 mg total) by mouth once daily.    oxycodone (ROXICODONE) 30 MG Tab Take 1 tablet (30 mg total) by mouth after meals as needed. To be filled on 4/30/17    pregabalin (LYRICA) 50 MG capsule Take 1 pill at nightx 7 days, followed by 1 pill in morning and night for 7 days, followed by 1 pill morning afternoon and night           Clinical Reference Information           Your Vitals Were     BP Pulse Temp Height Weight BMI    114/68 (BP  "Location: Right arm) 64 98.3 °F (36.8 °C) 5' 11" (1.803 m) 63.1 kg (139 lb 1.8 oz) 19.4 kg/m2      Blood Pressure          Most Recent Value    BP  114/68      Allergies as of 4/18/2017     Codeine      Immunizations Administered on Date of Encounter - 4/18/2017     None      Orders Placed During Today's Visit      Normal Orders This Visit    Ambulatory referral to Smoking Cessation Program     EKG 12-lead     Urinalysis     Future Labs/Procedures Expected by Expires    APTT  4/18/2017 6/17/2018    Brain natriuretic peptide  4/18/2017 6/17/2018    CBC auto differential  4/18/2017 6/17/2018    Comprehensive metabolic panel  4/18/2017 6/17/2018    Protime-INR  4/18/2017 6/17/2018    X-Ray Chest PA And Lateral  4/18/2017 4/18/2018      [facility] Sign-Up     Activating your [facility] account is as easy as 1-2-3!     1) Visit [email or web address], select Sign Up Now, enter this activation code and your date of birth, then select Next.  Activation code not generated  Current Patient Portal Status: Account disabled      2) Create a username and password to use when you visit [facility] in the future and select a security question in case you lose your password and select Next.    3) Enter your e-mail address and click Sign Up!    Additional Information  If you have questions, please e-mail [email or web address] or call [phone] to talk to our [facility] staff. Remember, [facility] is NOT to be used for urgent needs. For medical emergencies, dial 911.         Instructions    Labs and chest xray today    You will need clearance from your heart doctor    David zamudio resume taking today    You are not cleared for your surgery from us           Smoking Cessation     If you would like to quit smoking:   You may be eligible for free services if you are a [place] resident and started smoking cigarettes before September 1, 1988.  Call the Smoking Cessation Trust (SCT) toll free at (540) 159-6386 or (257) 737-3755.   Call " 1-800-QUIT-NOW if you do not meet the above criteria.   Contact us via email: [email or web address]   View our website for more information: [email or web address]        Language Assistance Services     ATTENTION: Language assistance services are available, free of charge. Please call [phone].      ATENCIÓN: Si habla español, tiene a rocha disposición servicios gratuitos de asistencia lingüística. Llame al [phone].     CHÚ Ý: N?u b?n nói Ti?ng Vi?t, có các d?ch v? h? tr? ngôn ng? mi?n phí dành cho b?n. G?i s? [phone].         [facility] complies with applicable Federal civil rights laws and does not discriminate on the basis of race, color, national origin, age, disability, or sex.

## 2017-04-18 NOTE — PATIENT INSTRUCTIONS
Labs and chest xray today    You will need clearance from your heart doctor    David zamudio resume taking today    You are not cleared for your surgery from us

## 2017-04-18 NOTE — PROGRESS NOTES
"Subjective:       Patient ID: Griffin Hall is a 72 y.o. male.    Chief Complaint: Pre-op Exam (bilateral cataract eye surgery) and Medication Refill (lasix)    HPI Comments: Pt here for pre op clearance for cataract surgery, also needs RF on Lasix.  Pt states that he has not taken his Lasix in a while      Medication Refill   Associated symptoms include coughing. Pertinent negatives include no chest pain, chills, fatigue or fever.     Past Medical History:   Diagnosis Date    Anxiety     Degenerative disc disease     Depression     Tobacco use 6/16/2016     Past Surgical History:   Procedure Laterality Date    CERVICAL FUSION N/A     THROAT SURGERY       Social History     Social History Narrative    From [facility]     Family History   Problem Relation Age of Onset    Cancer Mother     Cancer Father     Cancer Maternal Aunt     Cancer Maternal Uncle     Cancer Paternal Uncle      Vitals:    04/18/17 1104   BP: 114/68   Pulse: 64   Temp: 98.3 °F (36.8 °C)   Weight: 63.1 kg (139 lb 1.8 oz)   Height: 5' 11" (1.803 m)   PainSc:   7     Outpatient Encounter Prescriptions as of 4/18/2017   Medication Sig Dispense Refill    albuterol (VENTOLIN HFA) 90 mcg/actuation inhaler Inhale 2 puffs into the lungs every 6 (six) hours as needed for Wheezing. 1 Inhaler 6    alprazolam (XANAX) 2 MG Tab Take 1 tablet (2 mg total) by mouth 2 (two) times daily as needed. 60 tablet 0    aspirin (ECOTRIN) 81 MG EC tablet Take 1 tablet (81 mg total) by mouth once daily. 90 tablet 3    atorvastatin (LIPITOR) 20 MG tablet Take 1 tablet (20 mg total) by mouth once daily. 90 tablet 3    benazepril (LOTENSIN) 5 MG tablet Take 1 tablet (5 mg total) by mouth once daily. 90 tablet 3    buPROPion (WELLBUTRIN SR) 200 MG TbSR       carisoprodol (SOMA) 350 MG tablet   0    clopidogrel (PLAVIX) 75 mg tablet TAKE 1 TABLET BY MOUTH EVERY DAY 90 tablet 3    dextroamphetamine-amphetamine 10 mg Tab once daily.  0    " "fluticasone (FLONASE) 50 mcg/actuation nasal spray       furosemide (LASIX) 20 MG tablet Take 1 tablet (20 mg total) by mouth once daily. 30 tablet 11    gabapentin (NEURONTIN) 300 MG capsule Take 1 pill at before bed x 7 days, followed by 1 pill in morning and night for 7 days, followed by 1 pill morning afternoon and night 90 capsule 11    metoprolol succinate (TOPROL-XL) 50 MG 24 hr tablet Take 1 tablet (50 mg total) by mouth once daily. 90 tablet 3    oxycodone (ROXICODONE) 30 MG Tab Take 1 tablet (30 mg total) by mouth after meals as needed. To be filled on 4/30/17 90 tablet 0    pregabalin (LYRICA) 50 MG capsule Take 1 pill at nightx 7 days, followed by 1 pill in morning and night for 7 days, followed by 1 pill morning afternoon and night 90 capsule 6    [DISCONTINUED] furosemide (LASIX) 20 MG tablet Take 1 tablet (20 mg total) by mouth once daily. 30 tablet 11    [DISCONTINUED] methylPREDNISolone (MEDROL DOSEPACK) 4 mg tablet        No facility-administered encounter medications on file as of 4/18/2017.      Wt Readings from Last 3 Encounters:   04/18/17 63.1 kg (139 lb 1.8 oz)   04/05/17 62.6 kg (138 lb 0.1 oz)   03/01/17 61 kg (134 lb 7.7 oz)     Last 3 sets of Vitals    Vitals - 1 value per visit 3/1/2017 4/5/2017 4/18/2017   SYSTOLIC 111 116 114   DIASTOLIC 68 70 68   PULSE 67 70 64   TEMPERATURE 97.8 98.4 98.3   RESPIRATIONS 18 18 -   Weight (lb) 134.48 138.01 139.11   Weight (kg) 61 62.6 63.1   HEIGHT 6' 0" 6' 0" 5' 11"   BODY MASS INDEX 18.24 18.72 19.4   VISIT REPORT - - -   Pain Score  9 9 7     No results found for: CBC  Review of Systems   Constitutional: Negative for chills, fatigue and fever.   Respiratory: Positive for cough. Negative for shortness of breath.    Cardiovascular: Negative for chest pain and palpitations.   Psychiatric/Behavioral: Negative for sleep disturbance.       Objective:      Physical Exam   Constitutional: He is oriented to person, place, and time. He appears " well-developed and well-nourished.   Ill looking     HENT:   Head: Normocephalic and atraumatic.   Eyes: Pupils are equal, round, and reactive to light.   Cardiovascular: Normal rate, regular rhythm and normal heart sounds.    Pulmonary/Chest: Effort normal. No stridor. He has rales.   Abdominal: Soft.   Neurological: He is alert and oriented to person, place, and time.   Skin: Skin is warm and dry. No rash noted. He is not diaphoretic. No erythema. There is pallor.   Psychiatric: He has a normal mood and affect. His behavior is normal. Judgment and thought content normal.   Nursing note and vitals reviewed.      Assessment:       1. Pre-op evaluation    2. Status post non-ST elevation myocardial infarction (NSTEMI)    3. S/P drug eluting coronary stent placement    4. Heart failure, unspecified heart failure chronicity, unspecified heart failure type    5. Platelet inhibition due to Plavix    6. Chronic systolic heart failure    7. Tobacco use    8. Non-compliance        Plan:       Pt has not been taking Lasix, has not seen his cardiologist since 7/2016.  D/W pt that he needed to take his meds and f/u with his Cardiologist.  Pt is NOT cleared for his surgery  Pt aware  Griffin was seen today for pre-op exam and medication refill.    Diagnoses and all orders for this visit:    Pre-op evaluation  -     EKG 12-lead  -     CBC auto differential; Future  -     Comprehensive metabolic panel; Future  -     Brain natriuretic peptide; Future  -     APTT; Future  -     Protime-INR; Future  -     Cancel: Urinalysis  -     X-Ray Chest PA And Lateral; Future    Status post non-ST elevation myocardial infarction (NSTEMI)  -     EKG 12-lead  -     CBC auto differential; Future  -     Comprehensive metabolic panel; Future  -     Brain natriuretic peptide; Future  -     APTT; Future  -     Protime-INR; Future  -     Cancel: Urinalysis  -     X-Ray Chest PA And Lateral; Future    S/P drug eluting coronary stent placement  -     EKG  12-lead  -     CBC auto differential; Future  -     Comprehensive metabolic panel; Future  -     Brain natriuretic peptide; Future  -     APTT; Future  -     Protime-INR; Future  -     Cancel: Urinalysis  -     X-Ray Chest PA And Lateral; Future    Heart failure, unspecified heart failure chronicity, unspecified heart failure type  -     EKG 12-lead  -     CBC auto differential; Future  -     Comprehensive metabolic panel; Future  -     Brain natriuretic peptide; Future  -     APTT; Future  -     Protime-INR; Future  -     Cancel: Urinalysis  -     X-Ray Chest PA And Lateral; Future    Platelet inhibition due to Plavix  -     EKG 12-lead  -     CBC auto differential; Future  -     Comprehensive metabolic panel; Future  -     Brain natriuretic peptide; Future  -     APTT; Future  -     Protime-INR; Future  -     Cancel: Urinalysis  -     X-Ray Chest PA And Lateral; Future    Chronic systolic heart failure  -     Ambulatory referral to Smoking Cessation Program  -     furosemide (LASIX) 20 MG tablet; Take 1 tablet (20 mg total) by mouth once daily.    Tobacco use  -     Ambulatory referral to Smoking Cessation Program    Non-compliance      Patient Instructions   Labs and chest xray today    You will need clearance from your heart doctor    Lasix refilled resume taking today    You are not cleared for your surgery from us

## 2017-04-24 ENCOUNTER — TELEPHONE (OUTPATIENT)
Dept: FAMILY MEDICINE | Facility: CLINIC | Age: 72
End: 2017-04-24

## 2017-04-24 NOTE — TELEPHONE ENCOUNTER
----- Message from Janet Knapp sent at 4/24/2017 12:49 PM CDT -----  Contact: Daughter, Marley call her at  [phone]  Has a question about his pre op done on 4/19. Please call his daughter.

## 2017-04-24 NOTE — TELEPHONE ENCOUNTER
Spoke with patient,made him aware that I could not discuss his care with his daughter due ti HIPPA laws. He was made aware that he'd need to sign an involvement of care consent for me to do so.He stated that Cardiology is requesting we send a request for clearance.  He was also instructed that he did not need a referral or request because he should've been currently under their care, but was not following up as requested by Cardiology. He is aware and reminded that MELISSA Carrillo could not clear him for his eye surgery, he needed to be cleared by Cardiology, he verbalized understanding.

## 2017-04-25 ENCOUNTER — TELEPHONE (OUTPATIENT)
Dept: PAIN MEDICINE | Facility: CLINIC | Age: 72
End: 2017-04-25

## 2017-05-03 ENCOUNTER — TELEPHONE (OUTPATIENT)
Dept: PAIN MEDICINE | Facility: CLINIC | Age: 72
End: 2017-05-03

## 2017-05-03 NOTE — TELEPHONE ENCOUNTER
Patient called today about getting his refill on alprazolam (XANAX) 2 MG Tab from Dr. Browne. Pt advise that Dr. Browne is still in clinic and his Rx is pending. We will call him back today when it's approved.

## 2017-05-04 ENCOUNTER — TELEPHONE (OUTPATIENT)
Dept: PAIN MEDICINE | Facility: CLINIC | Age: 72
End: 2017-05-04

## 2017-05-04 RX ORDER — ALPRAZOLAM 2 MG/1
2 TABLET ORAL 2 TIMES DAILY PRN
Qty: 60 TABLET | Refills: 0 | Status: SHIPPED | OUTPATIENT
Start: 2017-05-04 | End: 2017-05-29 | Stop reason: SDUPTHER

## 2017-05-04 NOTE — TELEPHONE ENCOUNTER
----- Message from Andres Stanley MA sent at 5/2/2017 12:37 PM CDT -----  Spoke with pt today about his schedule appointment he though was for today but he's schedule wit you on Tuesday 9, 2017. Pt states he's out of his meds Xanax today and need refills, please. Pt states he wasn't feeling good today. He ask if we could take his Bp today and that was 123/71, temp 97.9 and pulse was 65.  Can we fax to  [address] [phone] (Phone)  [phone] (Fax).     Patient was last seen on 4/5/17 and last refilled on 4/5/17.  pulled. Please advise.      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 That he has been feeling more anxious, he is due for a refill on his xanax. He denies any SI/HI/Intent/plans. Pt will  the prescription today.    Pt gave permission to speak to his daughter but she did not answer the call. Will talk to her on Tuesday when pt has his appointment.

## 2017-05-09 ENCOUNTER — OFFICE VISIT (OUTPATIENT)
Dept: PAIN MEDICINE | Facility: CLINIC | Age: 72
End: 2017-05-09
Payer: MEDICARE

## 2017-05-09 DIAGNOSIS — F33.0 MDD (MAJOR DEPRESSIVE DISORDER), RECURRENT EPISODE, MILD: Primary | ICD-10-CM

## 2017-05-09 PROCEDURE — 99999 PR PBB SHADOW E&M-EST. PATIENT-LVL II: CPT | Mod: PBBFAC,,, | Performed by: PSYCHIATRY & NEUROLOGY

## 2017-05-09 PROCEDURE — 99213 OFFICE O/P EST LOW 20 MIN: CPT | Mod: S$PBB,,, | Performed by: PSYCHIATRY & NEUROLOGY

## 2017-05-09 PROCEDURE — 99212 OFFICE O/P EST SF 10 MIN: CPT | Mod: PBBFAC | Performed by: PSYCHIATRY & NEUROLOGY

## 2017-05-09 NOTE — PROGRESS NOTES
"Outpatient Psychiatry Follow-Up Visit (MD/NP)    5/9/2017    Clinical Status of Patient:  Outpatient (Ambulatory)    Chief Complaint:  Griffin Hall is a 72 y.o. male who presents today for follow-up of anxiety.  Met with patient.      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 He talked about the issues that come into play ie his loss of something that was his for so many years, the financial losses that he will have to take on. That he is concerned about how he will be able to sustain the quality of life that he has now. I attempted to call his daughter but was not able to talk to her. Pt reports that he is looking forward to one aspect of the long-term and that he will have more time to travel. That he is looking forward to meeting his friends in [place]. That he is still having issues with pain and has not been able to taper the medication dose. No issues with  Abuse.     Psychotherapy:  · Target symptoms: opioid dependence, benzodiazepine dependence  · Why chosen therapy is appropriate versus another modality: relevant to diagnosis, patient responds to this modality  · Outcome monitoring methods: self-report, observation, checklist/rating scale  · Therapeutic intervention type: behavior modifying psychotherapy, supportive psychotherapy  · Topics discussed/themes: stress related to medical comorbidities, difficulty managing affect in interpersonal relationships, building skills sets for symptom management, symptom recognition  · The patient's response to the intervention is accepting. The patient's progress toward treatment goals is fair.   · Duration of intervention: 30 minutes.     Review of Systems   · PSYCHIATRIC: Pertinant items are noted in the narrative.     Past Medical, Family and Social History: The patient's " "past medical, family and social history have been reviewed and updated as appropriate within the electronic medical record - see encounter notes.     Compliance: yes     Side effects: None     Risk Parameters:  Patient reports no suicidal ideation  Patient reports no homicidal ideation  Patient reports no self-injurious behavior  Patient reports no violent behavior     Exam (detailed: at least 9 elements; comprehensive: all 15 elements)      Musculoskeletal  Muscle Strength/Tone:  no tremor, no tic   Gait & Station:  non-ataxic      Psychiatric  Speech:  no latency; no press   Mood & Affect:  " worried about Pet Scan"  congruent and appropriate   Thought Process:  normal and logical, goal-directed   Associations:  intact   Thought Content:  normal, no suicidality, no homicidality, delusions, or paranoia   Insight:  intact   Judgement: behavior is adequate to circumstances   Orientation:  grossly intact   Memory: intact for content of interview   Language: grossly intact   Attention Span & Concentration:  able to focus   Fund of Knowledge:  intact and appropriate to age and level of education      Assessment and Diagnosis   Status/Progress: Based on the examination today, the patient's problem(s) is/are inadequately controlled. New problems have been presented today. Co-morbidities are complicating management of the primary condition. There are no active rule-out diagnoses for this patient at this time.                Impression: Pt is a 72 Y/O CM with PMHx sig for H/O head and neck cancer, chronic pain of both shoulders, cervical stenosis of spinal cord with           Opioid dependence in a controlled environment.  Benzodiazepine dependence  Unspecified anxiety, R/O BETTYE              Strengths and Liabilities: Strength: Patient accepts guidance/feedback, Strength: Patient is expressive/articulate., Strength: Patient is intelligent., Strength: Patient is motivated for change., Strength: Patient has positive support " network., Strength: Patient has reasonable judgment.      Treatment Goals: Specify outcomes written in observable, behavioral terms:   Anxiety: acquiring relapse prevention skills, eliminating all anxiety symptoms (SCL-90-R scores in normal range), reducing negative automatic thoughts, reducing physical symptoms of anxiety and reducing time spent worrying (<30 minutes/day)  Safety: to take medications only as prescribed by his MDs      Treatment Plan/Recommendations:   · Medication Management: Continue current medications. The risks and benefits of medication were discussed with the patient.  · Referral for further treatment to social work team for psychotherapy  · The treatment plan and follow up plan were reviewed with the patient.   · Pt reports that he has a very supportive family.  · Will assist pt with getting an appointment to see Pain management.  · Will cont the Roxicodone 30 mg upto tid prn pain, will decrease quantity to# 90 Not able to taper at this time. Will cont to assess. He has meds with him.   · Cont the xanax 2 mg upto twice daily, pt reports it also helps as a muscle relaxant.  · Will cont to monitor  · Reviewed LA .  · Discussed chronic pain rehabilitation.  · Discussed coping skills.                     Will put a consult for psychotherapy, to discuss issues with depression.         Return to Clinic: 1 month

## 2017-05-26 ENCOUNTER — TELEPHONE (OUTPATIENT)
Dept: PAIN MEDICINE | Facility: CLINIC | Age: 72
End: 2017-05-26

## 2017-05-26 NOTE — TELEPHONE ENCOUNTER
Called and spoke with pt today letting him know per Dr. Browne will renew your medication request on 6/1/17. This is your due date, pt staes ok.

## 2017-05-26 NOTE — TELEPHONE ENCOUNTER
----- Message from Demetria Browne MD sent at 5/25/2017  1:47 PM CDT -----  Lue he has till 6/1.  ----- Message -----  From: Andres Stanley MA  Sent: 5/25/2017  11:41 AM  To: Demetria Browne MD    Patient called today states he's going to be out of oxycodone (ROXICODONE) 30 MG Tab on Sunday coming. Pt reschedule his appointment from 6/5/17 to 6/20/17 with you. Patient states he will be out of alprazolam (XANAX) 2 MG Tab before you come back he will need that too.  His last refill was 4/5/17 patient states if he can talk to you for a minute that help's him. Patient states call back #[phone] pt  pulled today.

## 2017-05-29 ENCOUNTER — OFFICE VISIT (OUTPATIENT)
Dept: PAIN MEDICINE | Facility: CLINIC | Age: 72
End: 2017-05-29
Payer: MEDICARE

## 2017-05-29 VITALS
DIASTOLIC BLOOD PRESSURE: 69 MMHG | BODY MASS INDEX: 19.6 KG/M2 | TEMPERATURE: 98 F | WEIGHT: 140 LBS | RESPIRATION RATE: 18 BRPM | SYSTOLIC BLOOD PRESSURE: 94 MMHG | HEIGHT: 71 IN | HEART RATE: 67 BPM

## 2017-05-29 DIAGNOSIS — F11.20 OPIOID TYPE DEPENDENCE, CONTINUOUS USE: Primary | ICD-10-CM

## 2017-05-29 PROCEDURE — 99213 OFFICE O/P EST LOW 20 MIN: CPT | Mod: S$PBB,,, | Performed by: PSYCHIATRY & NEUROLOGY

## 2017-05-29 PROCEDURE — 99213 OFFICE O/P EST LOW 20 MIN: CPT | Mod: PBBFAC | Performed by: PSYCHIATRY & NEUROLOGY

## 2017-05-29 PROCEDURE — 99999 PR PBB SHADOW E&M-EST. PATIENT-LVL III: CPT | Mod: PBBFAC,,, | Performed by: PSYCHIATRY & NEUROLOGY

## 2017-05-29 RX ORDER — OXYCODONE HYDROCHLORIDE 30 MG/1
30 TABLET ORAL EVERY 6 HOURS PRN
Qty: 100 TABLET | Refills: 0 | Status: SHIPPED | OUTPATIENT
Start: 2017-05-29 | End: 2017-07-03 | Stop reason: SDUPTHER

## 2017-05-29 RX ORDER — ALPRAZOLAM 2 MG/1
2 TABLET ORAL 2 TIMES DAILY PRN
Qty: 60 TABLET | Refills: 0 | Status: SHIPPED | OUTPATIENT
Start: 2017-05-29 | End: 2017-07-03 | Stop reason: SDUPTHER

## 2017-05-29 NOTE — PROGRESS NOTES
Outpatient Psychiatry Follow-Up Visit (MD/NP)    5/29/2017    Clinical Status of Patient:  Outpatient (Ambulatory)    Chief Complaint:  Griffin Hall is a 72 y.o. male who presents today for follow-up of anxiety and opioid and benzodiazepine dependence.  Met with patient.      Interval History and Content of Current Session:  Interim Events/Subjective Report/Content of Current Session: Pt reports that he is  Still taking his medications asprescribed. His daughter was in the appointment with today. She rpeorts that her father has been having issues with chronic pain for many hyears and that he has been on the pain medications for that long. That she has not had any concerns for him that hr is taking more of the medications etc. That he has been compliant and she has not had any reasons to think otherwise. Pt rpeorts that he is still n a lot of pain and would like to see another MD in pain Management.     Psychotherapy:  · Target symptoms: opioid dependence, benzodiazepine dependence  · Why chosen therapy is appropriate versus another modality: relevant to diagnosis, patient responds to this modality  · Outcome monitoring methods: self-report, observation, checklist/rating scale  · Therapeutic intervention type: behavior modifying psychotherapy, supportive psychotherapy  · Topics discussed/themes: stress related to medical comorbidities, difficulty managing affect in interpersonal relationships, building skills sets for symptom management, symptom recognition  · The patient's response to the intervention is accepting. The patient's progress toward treatment goals is fair.   · Duration of intervention: 30 minutes.    Review of Systems   · PSYCHIATRIC: Pertinant items are noted in the narrative.    Past Medical, Family and Social History: The patient's past medical, family and social history have been reviewed and updated as appropriate within the electronic medical record - see encounter notes.    Compliance:  "yes    Side effects: None    Risk Parameters:  Patient reports no suicidal ideation  Patient reports no homicidal ideation  Patient reports no self-injurious behavior  Patient reports no violent behavior    Exam (detailed: at least 9 elements; comprehensive: all 15 elements)   Constitutional  Vitals:  Most recent vital signs, dated less than 90 days prior to this appointment, were reviewed.   Vitals:    05/29/17 1202   BP: 94/69   Pulse: 67   Resp: 18   Temp: 98.1 °F (36.7 °C)   TempSrc: Oral   Weight: 63.5 kg (140 lb)   Height: 5' 11" (1.803 m)        General:  age appropriate, casually dressed, neatly groomed     Musculoskeletal  Muscle Strength/Tone:  no tremor, no tic   Gait & Station:  non-ataxic     Psychiatric  Speech:  no latency; no press   Mood & Affect:  " worried about Pet Scan"  congruent and appropriate   Thought Process:  normal and logical, goal-directed   Associations:  intact   Thought Content:  normal, no suicidality, no homicidality, delusions, or paranoia   Insight:  intact   Judgement: behavior is adequate to circumstances   Orientation:  grossly intact   Memory: intact for content of interview   Language: grossly intact   Attention Span & Concentration:  able to focus   Fund of Knowledge:  intact and appropriate to age and level of education     Assessment and Diagnosis   Status/Progress: Based on the examination today, the patient's problem(s) is/are inadequately controlled.  New problems have been presented today.   Co-morbidities are complicating management of the primary condition.  There are no active rule-out diagnoses for this patient at this time.            Impression: Pt is a 70 Y/O CM with PMHx sig for H/O head and neck cancer, chronic pain of both shoulders, cervical stenosis of spinal cord with           Opioid dependence in a controlled environment.  Benzodiazepine dependence  Unspecified anxiety, R/O BETTYE              Strengths and Liabilities: Strength: Patient accepts " guidance/feedback, Strength: Patient is expressive/articulate., Strength: Patient is intelligent., Strength: Patient is motivated for change., Strength: Patient has positive support network., Strength: Patient has reasonable judgment.      Treatment Goals: Specify outcomes written in observable, behavioral terms:   Anxiety: acquiring relapse prevention skills, eliminating all anxiety symptoms (SCL-90-R scores in normal range), reducing negative automatic thoughts, reducing physical symptoms of anxiety and reducing time spent worrying (<30 minutes/day)  Safety: to take medications only as prescribed by his MDs      Treatment Plan/Recommendations:   · Medication Management: Continue current medications. The risks and benefits of medication were discussed with the patient.  · Referral for further treatment to social work team for psychotherapy  · The treatment plan and follow up plan were reviewed with the patient.   · That he will cont to f/u with Dr Mcmillan I have asked to pt to make a F/U appointment with Cheo Ulloa so she can review the results of the imaging studies that he got done recently.  · Have discussed with him that he will have to see them so that his medications for pain can be reassessed as he reports that his pain level has been more severe. That I have not been able to taper him off these medications as planned because of his on going pain issues. That i cannot continue to increase the medication dosages with out him consulting with Dr Mcmillan first.  · Will cont the Roxicodone 30 mg upto qid prn pain, will increase quantity to# 90 Not able to taper at this time. Will cont to assess.   · Cont the xanax 2 mg upto twice daily, pt reports it also helps as a muscle relaxant.  · He is no longer taking the adderall , not taking soma.   · Will cont to monitor  · Reviewed LA .  · Discussed chronic pain rehabilitation.  · Discussed coping skills.               Return to Clinic: 1 month.

## 2017-06-20 ENCOUNTER — TELEPHONE (OUTPATIENT)
Dept: PAIN MEDICINE | Facility: CLINIC | Age: 72
End: 2017-06-20

## 2017-06-26 ENCOUNTER — OFFICE VISIT (OUTPATIENT)
Dept: PAIN MEDICINE | Facility: CLINIC | Age: 72
End: 2017-06-26
Payer: MEDICARE

## 2017-06-26 ENCOUNTER — TELEPHONE (OUTPATIENT)
Dept: PAIN MEDICINE | Facility: CLINIC | Age: 72
End: 2017-06-26

## 2017-06-26 VITALS
DIASTOLIC BLOOD PRESSURE: 62 MMHG | WEIGHT: 130 LBS | TEMPERATURE: 98 F | SYSTOLIC BLOOD PRESSURE: 106 MMHG | HEART RATE: 63 BPM | BODY MASS INDEX: 18.2 KG/M2 | HEIGHT: 71 IN

## 2017-06-26 DIAGNOSIS — G89.4 CHRONIC PAIN DISORDER: ICD-10-CM

## 2017-06-26 DIAGNOSIS — M50.30 DDD (DEGENERATIVE DISC DISEASE), CERVICAL: ICD-10-CM

## 2017-06-26 DIAGNOSIS — M54.12 CERVICAL RADICULOPATHY: Primary | ICD-10-CM

## 2017-06-26 DIAGNOSIS — M48.02 CERVICAL STENOSIS OF SPINAL CANAL: ICD-10-CM

## 2017-06-26 PROCEDURE — 1125F AMNT PAIN NOTED PAIN PRSNT: CPT | Mod: ,,, | Performed by: NURSE PRACTITIONER

## 2017-06-26 PROCEDURE — 1159F MED LIST DOCD IN RCRD: CPT | Mod: ,,, | Performed by: NURSE PRACTITIONER

## 2017-06-26 PROCEDURE — 99213 OFFICE O/P EST LOW 20 MIN: CPT | Mod: PBBFAC | Performed by: NURSE PRACTITIONER

## 2017-06-26 PROCEDURE — 99499 UNLISTED E&M SERVICE: CPT | Mod: S$PBB,,, | Performed by: NURSE PRACTITIONER

## 2017-06-26 PROCEDURE — 99999 PR PBB SHADOW E&M-EST. PATIENT-LVL III: CPT | Mod: PBBFAC,,, | Performed by: NURSE PRACTITIONER

## 2017-06-26 PROCEDURE — 99213 OFFICE O/P EST LOW 20 MIN: CPT | Mod: S$PBB,,, | Performed by: NURSE PRACTITIONER

## 2017-06-26 NOTE — PROGRESS NOTES
Chronic Pain - Follow Up     Referring Physician: Ayden Almonte MD     Chief Complaint   Patient presents with    Joint Pain         SUBJECTIVE: Disclaimer: This note has been generated using voice-recognition software. There may be typographical errors that have been missed during proof-reading    Interval History 6/27/2017:  The patient returns to clinic today for follow up. He continues to follow up with Dr. Browne. He continues to complain of neck pain that radiates into his arms to his elbows bilaterally. He does report seeking an opinion from Dr. Donnie Willis in [place] and is schedule for cervical injections in the next few weeks. He continues to report low back pain as well. He did see cardiology recently and is no longer on Plavix.   He continues to take Roxicodone 30 mg TID with benefit. He denies any other health changes. His pain today is 8/10.    Interval History 2/2/2017:  Continues to f/u with Dr. Browne, had an injury and had MRI at OSH, but results not available today, but copies were given to Dr. Browne, waiting for them to be scanned into the system.   Gabapentin caused dizziness so this was discontinued before an effective dose could be found.  Has not tried Lyrica in the past.    Interval history 11/18/2016:  Since previous encounter the patient has been receiving oxycodone 30 mg tablets 3-4 per day from multiple different physicians, he has not had any interventions or any other adjuvant medications added to his pain regimen.  Additionally the patient has had a recent MI in June with a drug-eluting stent placed and is now on Plavix.  He continues to endorse neck pain with bilateral upper extremity radicular symptoms is his worst pain.  He has had a previous ACDF and has a CT scan of the cervical spine which shows moderate neuroforaminal stenosis at C4-5 which would correspond to his shoulder pain bilaterally.  He has not been evaluated by orthopedics.   he previously did not  "follow my recommendations and is still not under psychiatric treatment.      Initial encounter:     Griffin Hall presents to the clinic for the evaluation of neck pain. The pain started 15 years ago following cervical fusion and symptoms have been worsening.     Brief history: Patient reports a history squamous carcinoma of the neck in 2006 which was resected in [facility] he states that he received 6 weeks of radiation treatment prior to surgical resection but did not have any chemotherapy treatments and reports that he has not been in follow-up by oncology. he states that he has been getting all of his treatment from his family practitioner Dr. Barry Keene. He states that Dr. Saunders is retiring and sent him to a new practitioner although he does not recall the name of the new practitioner and he had one encounter with the practitioner who referred him to pain management to resume his opioid prescriptions. The patient has been taking oxycodone 30 mg tablets he states 3-4 per day and was being given on average 130 tablets per month. His last prescription was provided according to  on 5/26/2015, and he states that he is out of his medication. He could not account for the reason why he is out early although he offered multiple explanations "sometimes you lose some, sometimes you step on some, I'm not sure what happened in this case, but I assure you I am not a drug addict"      Pain Description:     The pain is located in the neck upper back area and radiates to the across the chest.      At BEST 5/10      At WORST  7/10 on the WORST day.      On average pain is rated as 4/10.      Today the pain is rated as 5/10     The pain is described as aching, burning, numbing, sharp, shooting, tight band and deep       Symptoms interfere with daily activity, sleeping and work.      Exacerbating factors: Standing, Bending, Touching, Coughing/Sneezing, Eating, Walking, Night Time, Morning, Extension, " Flexing and Lifting.      Mitigating factors heat, laying down, medications, rest and medications and injections (although he did not specify which injections he has had in the past).      Patient denies urinary incontinence and bowel incontinence. Patient has been having decreased appetite treated with Megace     Patient denies any suicidal or homicidal ideations     Pain Medications:  Current:  Oxycodone 30mg 3-4 /day     Tried in Past:  NSAIDs -Never  TCA -Never  SNRI -Never  Anti-convulsants -gabapentin causes dizziness.  Muscle Relaxants -Never  Opioids-Never     Physical Therapy/Home Exercise: no       report: Reviewed and consistent with medication use as prescribed.     Pain Procedures: ?     Chiropractor -never  Acupuncture - never  TENS unit -never  Spinal decompression -never  Joint replacement -never    Imaging:   MRI Cervical Spine 1/19/2017:  Stable anterior and interbody fusion of C5-6. 2 mm anterolisthesis at C4 which were not present on the prior study. Spondylosis at multiple cervical levels which has worsened since the prior study in 2012.     C2-3: A posterior disc protrusion is present causing moderate spinal stenosis, effacement of the anterior thecal sac and flattening of the anterior cervical cord.     C3-4: Posterior disc protrusion which causes moderate spinal stenosis, effacement of the anterior thecal sac, and flattening of the anterior cervical cord.     C4-5: Posterior disc osteophyte complex which has worsened significantly since the prior study and now causes moderate spinal stenosis, effacement of the anterior thecal sac, and flattening of the anterior cervical cord.    C6-7: Posterior disc osteophyte complex is stable when compared to the prior study. Thickening of the ligamentum flavum has worsened. Moderate spinal stenosis with effacement of the anterior thecal sac. Moderate to severe right-sided neural foraminal stenosis with suspected impingement of the exiting nerve root.        MRI Thoracic Spine 1/19/2017:  An exaggerated thoracic kyphosis. Old superior endplate compression fracture to the T4 vertebral body. Superior endplate Schmorl's nodes are present at T1 and T2. Disc bulge at T3-4 which does not cause significant spinal or neural foraminal stenosis. Thick potentially calcified pleural plaques are present bilaterally along with airspace disease, atelectasis, and pulmonary nodularity. Dedicated imaging of the chest is recommended if this has not already been done.     MRI Lumbar Spine 1/19/2017:  A superior endplate compression fracture is present to the L2 vertebral body which has healed with a residual 30% loss of height. 2 mm retrolisthesis at L2.     L1-2: Annular bulge without spinal or neural foraminal stenosis.     L2-3 Annular bulge with mild spinal stenosis and no neural foraminal stenosis.    L3-4: Annular bulge with mild spinal stenosis and mild bilateral neural foraminal stenosis.     L4-5: Annular bulge and small posterior annular tear seen on image 11 of series 3. An annular defect is present to the anterior right lateral portion of the disc and was not seen on the prior study. Mild spinal stenosis and moderate bilateral neural foraminal stenosis.     L5-S1: Annular bulge with no spinal stenosis and mild left-sided neural foraminal stenosis.          REVIEW OF SYSTEMS:     GENERAL:  Weight loss and decreased appetite  HEENT:  No recent changes in vision or hearing  NECK:  difficulty with swallowing.  RESPIRATORY: Negative for cough, wheezing or shortness of breath, patient denies any recent URI.  CARDIOVASCULAR: Negative for chest pain, leg swelling or palpitations.  GI: Negative for abdominal discomfort, blood in stools or black stools or change in bowel habits.  MUSCULOSKELETAL: See HPI.  SKIN: Negative for lesions, rash, and itching.  PSYCH:  Depression and anxiety treated with citalopram, Wellbutrin, alprazolam, no psychiatrist.  Patient's sleep is disturbed  "secondary to pain.  HEMATOLOGY/LYMPHOLOGY: Negative for prolonged bleeding, bruising easily or swollen nodes.   ENDO: No history of diabetes or thyroid dysfunction  NEURO: No history of headaches, syncope, paralysis, seizures or tremors.  All other reviewed and negative other than HPI.     OBJECTIVE:    /62   Pulse 63   Temp 98.3 °F (36.8 °C) (Oral)   Ht 5' 11" (1.803 m)   Wt 59 kg (130 lb)   BMI 18.13 kg/m²     PHYSICAL EXAMINATION:    GENERAL: Well appearing, in no acute distress, alert and oriented x3.  PSYCH:  Mood and affect appropriate.  SKIN: Skin color, texture, turgor normal, no rashes or lesions.  HEAD/FACE:  Normocephalic, atraumatic. Cranial nerves grossly intact.  NECK: Pain to palpation over the cervical paraspinous muscles. Spurling positive. Pain with neck flexion, extension, and lateral flexion, limited range of motion in the neck.   CV: RRR with palpation of the radial artery.  PULM: No evidence of respiratory difficulty, symmetric chest rise.  EXTREMITIES: Peripheral joint ROM is full and pain free without obvious instability or laxity in all four extremities. No deformities, edema, or skin discoloration. Good capillary refill.  MUSCULOSKELETAL: Shoulder provocative maneuvers are cause pain.   Bilateral upper extremity strength is normal and symmetric.  No atrophy or tone abnormalities are noted.  NEURO: Bilateral upper extremity coordination and muscle stretch reflexes are physiologic and symmetric.  Darren's negative. No clonus.  No loss of sensation is noted.  GAIT: Antalgic, ambulates without assistance      ASSESSMENT: 70 y.o. year old male with pain, consistent with      Encounter Diagnoses   Name Primary?    Cervical radiculopathy Yes    DDD (degenerative disc disease), cervical     Cervical stenosis of spinal canal     Chronic pain disorder       PLAN:     - Previous imaging was reviewed and discussed with the patient today.    - I discussed with him the potential to do " cervical epidurals here. He is currently seeking an opinion from Dr. Hernandes.     - In the future, we may consider lumbar VALERI.     - Continue to follow up with Dr. Browne.     - RTC PRN.     - Dr. Mcmillan was consulted on the patient and agrees with this plan.    The above plan and management options were discussed at length with patient. Patient is in agreement with the above and verbalized understanding.      Janis Loera NP  6/26/2017

## 2017-06-29 ENCOUNTER — TELEPHONE (OUTPATIENT)
Dept: PAIN MEDICINE | Facility: CLINIC | Age: 72
End: 2017-06-29

## 2017-06-29 NOTE — TELEPHONE ENCOUNTER
----- Message from Demetria Browne MD sent at 6/28/2017  2:35 PM CDT -----  Andres please get him an appointment to see me ASAP  ----- Message -----  From: Andres Stanley MA  Sent: 6/26/2017   4:05 PM  To: Demetria Browne MD    Patient came in today to see Janis Loera and pt stopped by letting you know he's out of medication  oxycodone (ROXICODONE) 30 MG Tab  on 05/29/17. He has appointment schedule with you on 7/20/17. His last refill was 05/29/17. He will come back to  oxycodone (ROXICODONE) 30 MG Tab refill when it's ready.

## 2017-07-03 ENCOUNTER — OFFICE VISIT (OUTPATIENT)
Dept: PAIN MEDICINE | Facility: CLINIC | Age: 72
End: 2017-07-03
Payer: MEDICARE

## 2017-07-03 VITALS
WEIGHT: 130 LBS | HEIGHT: 71 IN | BODY MASS INDEX: 18.2 KG/M2 | TEMPERATURE: 98 F | DIASTOLIC BLOOD PRESSURE: 61 MMHG | HEART RATE: 59 BPM | SYSTOLIC BLOOD PRESSURE: 104 MMHG

## 2017-07-03 DIAGNOSIS — F11.20 OPIOID TYPE DEPENDENCE, CONTINUOUS USE: Primary | ICD-10-CM

## 2017-07-03 PROCEDURE — 1159F MED LIST DOCD IN RCRD: CPT | Mod: ,,, | Performed by: PSYCHIATRY & NEUROLOGY

## 2017-07-03 PROCEDURE — 1125F AMNT PAIN NOTED PAIN PRSNT: CPT | Mod: ,,, | Performed by: PSYCHIATRY & NEUROLOGY

## 2017-07-03 PROCEDURE — 99213 OFFICE O/P EST LOW 20 MIN: CPT | Mod: PBBFAC | Performed by: PSYCHIATRY & NEUROLOGY

## 2017-07-03 PROCEDURE — 99999 PR PBB SHADOW E&M-EST. PATIENT-LVL III: CPT | Mod: PBBFAC,,, | Performed by: PSYCHIATRY & NEUROLOGY

## 2017-07-03 PROCEDURE — 99213 OFFICE O/P EST LOW 20 MIN: CPT | Mod: S$PBB,,, | Performed by: PSYCHIATRY & NEUROLOGY

## 2017-07-03 RX ORDER — OXYCODONE HYDROCHLORIDE 30 MG/1
30 TABLET ORAL EVERY 6 HOURS PRN
Qty: 100 TABLET | Refills: 0 | Status: SHIPPED | OUTPATIENT
Start: 2017-07-03 | End: 2017-07-27 | Stop reason: SDUPTHER

## 2017-07-03 RX ORDER — ALPRAZOLAM 2 MG/1
2 TABLET ORAL 2 TIMES DAILY PRN
Qty: 60 TABLET | Refills: 0 | Status: SHIPPED | OUTPATIENT
Start: 2017-07-03 | End: 2017-07-27 | Stop reason: SDUPTHER

## 2017-07-03 NOTE — PROGRESS NOTES
"Outpatient Psychiatry Follow-Up Visit (MD/NP)    7/3/2017    Clinical Status of Patient:  Outpatient (Ambulatory)    Chief Complaint:  Griffin Hall is a 72 y.o. male who presents today for follow-up of opioid dependence.  Met with patient.      Pt reports that he has been in a lot more pain lately because he has not had his medications. That he did see Janis Freed NP but he has decided that he will F/U with Dr Hernandes in [facility]. He has an appointment with him on 7/20. I have discussed with him that I will; no longer be prescribing him the pain medications and he should discuss this with the MD there. He report that he is doing "OK" other wise. I had increased the medication and will continue with the same for now. 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 That he is taking better care of himself and has friends for support, still has plans to go to [place].    Psychotherapy:  · Target symptoms: opioid dependence, benzodiazepine dependence  · Why chosen therapy is appropriate versus another modality: relevant to diagnosis, patient responds to this modality  · Outcome monitoring methods: self-report, observation, checklist/rating scale  · Therapeutic intervention type: behavior modifying psychotherapy, supportive psychotherapy  · Topics discussed/themes: stress related to medical comorbidities, difficulty managing affect in interpersonal relationships, building skills sets for symptom management, symptom recognition  · The patient's response to the intervention is accepting. The patient's progress toward treatment goals is fair.   · Duration of intervention: 30 minutes.     Review of Systems   · PSYCHIATRIC: Pertinant items are noted in the narrative.     Past Medical, Family and Social History: The patient's past medical, family and social history have been reviewed and updated as appropriate within the electronic medical record - see encounter " "notes.     Compliance: yes     Side effects: None     Risk Parameters:  Patient reports no suicidal ideation  Patient reports no homicidal ideation  Patient reports no self-injurious behavior  Patient reports no violent behavior     Exam (detailed: at least 9 elements; comprehensive: all 15 elements)   Constitutional  Vitals:  Most recent vital signs, dated less than 90 days prior to this appointment, were reviewed.       Vitals:     05/29/17 1202   BP: 94/69   Pulse: 67   Resp: 18   Temp: 98.1 °F (36.7 °C)   TempSrc: Oral   Weight: 63.5 kg (140 lb)   Height: 5' 11" (1.803 m)       General:  age appropriate, casually dressed, neatly groomed      Musculoskeletal  Muscle Strength/Tone:  no tremor, no tic   Gait & Station:  non-ataxic      Psychiatric  Speech:  no latency; no press   Mood & Affect:  "  In pain"  congruent and appropriate   Thought Process:  normal and logical, goal-directed   Associations:  intact   Thought Content:  normal, no suicidality, no homicidality, delusions, or paranoia   Insight:  intact   Judgement: behavior is adequate to circumstances   Orientation:  grossly intact   Memory: intact for content of interview   Language: grossly intact   Attention Span & Concentration:  able to focus   Fund of Knowledge:  intact and appropriate to age and level of education      Assessment and Diagnosis   Status/Progress: Based on the examination today, the patient's problem(s) is/are inadequately controlled.  New problems have been presented today.   Co-morbidities are complicating management of the primary condition.  There are no active rule-out diagnoses for this patient at this time.               Impression: Pt is a 70 Y/O CM with PMHx sig for H/O head and neck cancer, chronic pain of both shoulders, cervical stenosis of spinal cord with           Opioid dependence in a controlled environment.  Benzodiazepine dependence  Unspecified anxiety, R/O BETTYE              Strengths and Liabilities: Strength: " Patient accepts guidance/feedback, Strength: Patient is expressive/articulate., Strength: Patient is intelligent., Strength: Patient is motivated for change., Strength: Patient has positive support network., Strength: Patient has reasonable judgment.      Treatment Goals: Specify outcomes written in observable, behavioral terms:   Anxiety: acquiring relapse prevention skills, eliminating all anxiety symptoms (SCL-90-R scores in normal range), reducing negative automatic thoughts, reducing physical symptoms of anxiety and reducing time spent worrying (<30 minutes/day)  Safety: to take medications only as prescribed by his MDs      Treatment Plan/Recommendations:   · Medication Management: Continue current medications. The risks and benefits of medication were discussed with the patient.  · The treatment plan and follow up plan were reviewed with the patient.   · Will cont the Roxicodone 30 mg upto qid prn pain # 90. This will be the last prescription from me.   · Cont the xanax 2 mg upto twice daily, pt reports it also helps as a muscle relaxant.  · He is no longer taking the adderall , not taking soma.   · Reviewed LA .  · Discussed chronic pain rehabilitation.  · Discussed coping skills.                                          Pt reports that he has decided that he will F/U with Dr Donnie Hernandes in [facility].Has an appointment with him 0n 7/20                     Return to Clinic: as needed

## 2017-07-26 ENCOUNTER — OFFICE VISIT (OUTPATIENT)
Dept: PAIN MEDICINE | Facility: CLINIC | Age: 72
End: 2017-07-26
Payer: MEDICARE

## 2017-07-26 VITALS
SYSTOLIC BLOOD PRESSURE: 113 MMHG | BODY MASS INDEX: 18.34 KG/M2 | WEIGHT: 135.38 LBS | HEART RATE: 67 BPM | HEIGHT: 72 IN | TEMPERATURE: 98 F | DIASTOLIC BLOOD PRESSURE: 64 MMHG | RESPIRATION RATE: 18 BRPM

## 2017-07-26 DIAGNOSIS — M50.30 DDD (DEGENERATIVE DISC DISEASE), CERVICAL: ICD-10-CM

## 2017-07-26 DIAGNOSIS — G89.4 CHRONIC PAIN DISORDER: ICD-10-CM

## 2017-07-26 DIAGNOSIS — M48.02 CERVICAL STENOSIS OF SPINAL CANAL: Primary | ICD-10-CM

## 2017-07-26 DIAGNOSIS — M54.12 CERVICAL RADICULOPATHY: ICD-10-CM

## 2017-07-26 PROCEDURE — 99213 OFFICE O/P EST LOW 20 MIN: CPT | Mod: PBBFAC | Performed by: NURSE PRACTITIONER

## 2017-07-26 PROCEDURE — 99499 UNLISTED E&M SERVICE: CPT | Mod: S$PBB,,, | Performed by: NURSE PRACTITIONER

## 2017-07-26 PROCEDURE — 1125F AMNT PAIN NOTED PAIN PRSNT: CPT | Mod: ,,, | Performed by: NURSE PRACTITIONER

## 2017-07-26 PROCEDURE — 99213 OFFICE O/P EST LOW 20 MIN: CPT | Mod: S$PBB,,, | Performed by: NURSE PRACTITIONER

## 2017-07-26 PROCEDURE — 1159F MED LIST DOCD IN RCRD: CPT | Mod: ,,, | Performed by: NURSE PRACTITIONER

## 2017-07-26 PROCEDURE — 99999 PR PBB SHADOW E&M-EST. PATIENT-LVL III: CPT | Mod: PBBFAC,,, | Performed by: NURSE PRACTITIONER

## 2017-07-26 NOTE — PROGRESS NOTES
Chronic Pain - Follow Up     Referring Physician: Ayden Almonte MD     Chief Complaint   Patient presents with    Neck Pain    Low-back Pain         SUBJECTIVE: Disclaimer: This note has been generated using voice-recognition software. There may be typographical errors that have been missed during proof-reading    Interval History 7/26/2017:  The patient returns to clinic today for follow up. He continues to complain of neck pain that radiates into his arms to his elbows bilaterally. He did see Dr. Willis but did not wish to proceed with injections at his office. He reports that he is more comfortable here at [facility]. He is interested in procedures here. He denies any weakness to his arms. He continues to follow up with Dr. Trent. He continues to take Roxicodone 30 mg TID with benefit. He denies any adverse effects from this medication. He denies any other health changes. His pain today is 10/10.    Interval History 6/27/2017:  The patient returns to clinic today for follow up. He continues to follow up with Dr. Browne. He continues to complain of neck pain that radiates into his arms to his elbows bilaterally. He does report seeking an opinion from Dr. Donnie Willis in [place] and is schedule for cervical injections in the next few weeks. He continues to report low back pain as well. He did see cardiology recently and is no longer on Plavix.   He continues to take Roxicodone 30 mg TID with benefit. He denies any other health changes. His pain today is 8/10.    Interval History 2/2/2017:  Continues to f/u with Dr. Browne, had an injury and had MRI at [facility], but results not available today, but copies were given to Dr. Browne, waiting for them to be scanned into the system.   Gabapentin caused dizziness so this was discontinued before an effective dose could be found.  Has not tried Lyrica in the past.    Interval history 11/18/2016:  Since previous encounter the patient has been receiving  "oxycodone 30 mg tablets 3-4 per day from multiple different physicians, he has not had any interventions or any other adjuvant medications added to his pain regimen.  Additionally the patient has had a recent MI in June with a drug-eluting stent placed and is now on Plavix.  He continues to endorse neck pain with bilateral upper extremity radicular symptoms is his worst pain.  He has had a previous ACDF and has a CT scan of the cervical spine which shows moderate neuroforaminal stenosis at C4-5 which would correspond to his shoulder pain bilaterally.  He has not been evaluated by orthopedics.   he previously did not follow my recommendations and is still not under psychiatric treatment.      Initial encounter:     Griffin Hall presents to the clinic for the evaluation of neck pain. The pain started 15 years ago following cervical fusion and symptoms have been worsening.     Brief history: Patient reports a history squamous carcinoma of the neck in 2006 which was resected in [facility] he states that he received 6 weeks of radiation treatment prior to surgical resection but did not have any chemotherapy treatments and reports that he has not been in follow-up by oncology. he states that he has been getting all of his treatment from his family practitioner Dr. Barry Keene. He states that Dr. Saunders is retiring and sent him to a new practitioner although he does not recall the name of the new practitioner and he had one encounter with the practitioner who referred him to pain management to resume his opioid prescriptions. The patient has been taking oxycodone 30 mg tablets he states 3-4 per day and was being given on average 130 tablets per month. His last prescription was provided according to  on 5/26/2015, and he states that he is out of his medication. He could not account for the reason why he is out early although he offered multiple explanations "sometimes you lose some, sometimes you " "step on some, I'm not sure what happened in this case, but I assure you I am not a drug addict"      Pain Description:     The pain is located in the neck upper back area and radiates to the across the chest.      At BEST 5/10      At WORST  7/10 on the WORST day.      On average pain is rated as 4/10.      Today the pain is rated as 5/10     The pain is described as aching, burning, numbing, sharp, shooting, tight band and deep       Symptoms interfere with daily activity, sleeping and work.      Exacerbating factors: Standing, Bending, Touching, Coughing/Sneezing, Eating, Walking, Night Time, Morning, Extension, Flexing and Lifting.      Mitigating factors heat, laying down, medications, rest and medications and injections (although he did not specify which injections he has had in the past).      Patient denies urinary incontinence and bowel incontinence. Patient has been having decreased appetite treated with Megace     Patient denies any suicidal or homicidal ideations     Pain Medications:  Current:  Oxycodone 30mg 3-4 /day     Tried in Past:  NSAIDs -Never  TCA -Never  SNRI -Never  Anti-convulsants -gabapentin causes dizziness.  Muscle Relaxants -Never  Opioids-Never     Physical Therapy/Home Exercise: no       report: Reviewed and consistent with medication use as prescribed.     Pain Procedures:   ESIs in the past (1994)     Chiropractor -never  Acupuncture - never  TENS unit -never  Spinal decompression -Cervical fusion  Joint replacement -never    Imaging:   MRI Cervical Spine 1/19/2017:  Stable anterior and interbody fusion of C5-6. 2 mm anterolisthesis at C4 which were not present on the prior study. Spondylosis at multiple cervical levels which has worsened since the prior study in 2012.     C2-3: A posterior disc protrusion is present causing moderate spinal stenosis, effacement of the anterior thecal sac and flattening of the anterior cervical cord.     C3-4: Posterior disc protrusion which causes " moderate spinal stenosis, effacement of the anterior thecal sac, and flattening of the anterior cervical cord.     C4-5: Posterior disc osteophyte complex which has worsened significantly since the prior study and now causes moderate spinal stenosis, effacement of the anterior thecal sac, and flattening of the anterior cervical cord.    C6-7: Posterior disc osteophyte complex is stable when compared to the prior study. Thickening of the ligamentum flavum has worsened. Moderate spinal stenosis with effacement of the anterior thecal sac. Moderate to severe right-sided neural foraminal stenosis with suspected impingement of the exiting nerve root.       MRI Thoracic Spine 1/19/2017:  An exaggerated thoracic kyphosis. Old superior endplate compression fracture to the T4 vertebral body. Superior endplate Schmorl's nodes are present at T1 and T2. Disc bulge at T3-4 which does not cause significant spinal or neural foraminal stenosis. Thick potentially calcified pleural plaques are present bilaterally along with airspace disease, atelectasis, and pulmonary nodularity. Dedicated imaging of the chest is recommended if this has not already been done.     MRI Lumbar Spine 1/19/2017:  A superior endplate compression fracture is present to the L2 vertebral body which has healed with a residual 30% loss of height. 2 mm retrolisthesis at L2.     L1-2: Annular bulge without spinal or neural foraminal stenosis.     L2-3 Annular bulge with mild spinal stenosis and no neural foraminal stenosis.    L3-4: Annular bulge with mild spinal stenosis and mild bilateral neural foraminal stenosis.     L4-5: Annular bulge and small posterior annular tear seen on image 11 of series 3. An annular defect is present to the anterior right lateral portion of the disc and was not seen on the prior study. Mild spinal stenosis and moderate bilateral neural foraminal stenosis.     L5-S1: Annular bulge with no spinal stenosis and mild left-sided neural  foraminal stenosis.          REVIEW OF SYSTEMS:     GENERAL:  Weight loss and decreased appetite  HEENT:  No recent changes in vision or hearing  NECK:  difficulty with swallowing.  RESPIRATORY: Negative for cough, wheezing or shortness of breath, patient denies any recent URI.  CARDIOVASCULAR: Negative for chest pain, leg swelling or palpitations.  GI: Negative for abdominal discomfort, blood in stools or black stools or change in bowel habits.  MUSCULOSKELETAL: See HPI.  SKIN: Negative for lesions, rash, and itching.  PSYCH:  Depression and anxiety treated with citalopram, Wellbutrin, alprazolam.  Patient's sleep is disturbed secondary to pain.  HEMATOLOGY/LYMPHOLOGY: Negative for prolonged bleeding, bruising easily or swollen nodes.   ENDO: No history of diabetes or thyroid dysfunction  NEURO: No history of headaches, syncope, paralysis, seizures or tremors.  All other reviewed and negative other than HPI.     OBJECTIVE:    /64   Pulse 67   Temp 97.9 °F (36.6 °C) (Oral)   Resp 18   Ht 6' (1.829 m)   Wt 61.4 kg (135 lb 5.8 oz)   BMI 18.36 kg/m²     PHYSICAL EXAMINATION:    GENERAL: Well appearing, in no acute distress, alert and oriented x3.  PSYCH:  Mood and affect appropriate.  SKIN: Skin color, texture, turgor normal, no rashes or lesions.  HEAD/FACE:  Normocephalic, atraumatic. Cranial nerves grossly intact.  NECK: Pain to palpation over the cervical paraspinous muscles. Spurling positive. Pain with neck flexion, extension, and lateral flexion. Very limited range of motion in the neck.   CV: RRR with palpation of the radial artery.  PULM: No evidence of respiratory difficulty, symmetric chest rise.  EXTREMITIES: Peripheral joint ROM is full and pain free without obvious instability or laxity in all four extremities. No deformities, edema, or skin discoloration. Good capillary refill.  MUSCULOSKELETAL: Shoulder provocative maneuvers are cause pain.  Bilateral upper extremity strength is normal and  symmetric.  No atrophy or tone abnormalities are noted.  NEURO: Bilateral upper extremity coordination and muscle stretch reflexes are physiologic and symmetric.  Darren's negative. No clonus.  No loss of sensation is noted.  GAIT: Antalgic, ambulates without assistance      ASSESSMENT: 70 y.o. year old male with pain, consistent with      Encounter Diagnoses   Name Primary?    Cervical stenosis of spinal canal Yes    Cervical radiculopathy     DDD (degenerative disc disease), cervical     Chronic pain disorder       PLAN:     - Previous imaging was reviewed and discussed with the patient today.    - He would like to switch providers in the clinic. I discussed his case with Dr. Dietz who will assume care.     - Schedule for C7-T1 IL VALERI. The procedure, risks, benefits and options were discussed with patient. There are no contraindications to the procedure. The patient expressed understanding and agreed to proceed.  Consent obtained today.    - In the future, we may consider lumbar VALERI.     - Continue to follow up with Dr. Browne.     - Continue Roxicodone 30 mg TID.     - RTC 2 weeks after above procedure.     - Dr. Dietz was consulted on the patient and agrees with this plan.    The above plan and management options were discussed at length with patient. Patient is in agreement with the above and verbalized understanding.     Janis Loera NP  07/26/2017

## 2017-07-27 RX ORDER — OXYCODONE HYDROCHLORIDE 30 MG/1
30 TABLET ORAL 3 TIMES DAILY PRN
Qty: 90 TABLET | Refills: 0 | Status: SHIPPED | OUTPATIENT
Start: 2017-07-27 | End: 2017-09-07

## 2017-07-27 RX ORDER — ALPRAZOLAM 2 MG/1
2 TABLET ORAL 2 TIMES DAILY PRN
Qty: 60 TABLET | Refills: 0 | Status: SHIPPED | OUTPATIENT
Start: 2017-07-27 | End: 2017-09-07 | Stop reason: SDUPTHER

## 2017-07-28 ENCOUNTER — TELEPHONE (OUTPATIENT)
Dept: PAIN MEDICINE | Facility: CLINIC | Age: 72
End: 2017-07-28

## 2017-08-03 ENCOUNTER — OFFICE VISIT (OUTPATIENT)
Dept: OTOLARYNGOLOGY | Facility: CLINIC | Age: 72
End: 2017-08-03
Payer: MEDICARE

## 2017-08-03 VITALS
BODY MASS INDEX: 17.64 KG/M2 | WEIGHT: 130.06 LBS | DIASTOLIC BLOOD PRESSURE: 72 MMHG | HEART RATE: 114 BPM | SYSTOLIC BLOOD PRESSURE: 101 MMHG

## 2017-08-03 DIAGNOSIS — Z92.3 HISTORY OF RADIATION TO HEAD AND NECK REGION: ICD-10-CM

## 2017-08-03 DIAGNOSIS — R13.10 DYSPHAGIA, UNSPECIFIED TYPE: Primary | ICD-10-CM

## 2017-08-03 DIAGNOSIS — R13.19 OTHER DYSPHAGIA: ICD-10-CM

## 2017-08-03 DIAGNOSIS — F17.200 CURRENT SMOKER: ICD-10-CM

## 2017-08-03 DIAGNOSIS — Z85.89 HISTORY OF HEAD AND NECK CANCER: ICD-10-CM

## 2017-08-03 PROCEDURE — 99214 OFFICE O/P EST MOD 30 MIN: CPT | Mod: 25,S$GLB,, | Performed by: OTOLARYNGOLOGY

## 2017-08-03 PROCEDURE — 31575 DIAGNOSTIC LARYNGOSCOPY: CPT | Mod: S$GLB,,, | Performed by: OTOLARYNGOLOGY

## 2017-08-03 PROCEDURE — 1159F MED LIST DOCD IN RCRD: CPT | Mod: S$GLB,,, | Performed by: OTOLARYNGOLOGY

## 2017-08-03 NOTE — LETTER
August 6, 2017      Luz Claudio MD  [address] [facility] [address]           [facility]  [address]  [facility] [address]  Phone: [phone]  Fax: [phone]          Patient: Griffin Hall   MR Number: [id number]   YOB: 1945   Date of Visit: 8/3/2017       Dear Dr. Luz Claudio:    Thank you for referring Griffin Hall to me for evaluation. Attached you will find relevant portions of my assessment and plan of care.    If you have questions, please do not hesitate to call me. I look forward to following Griffin Hall along with you.    Sincerely,        Enclosure  CC:  No Recipients    If you would like to receive this communication electronically, please contact [email or web address] or [phone] to request more information on [employer] access.    For providers and/or their staff who would like to refer a patient to [facility], please contact us through our one-stop-shop provider referral line, [facility] , at [phone].    If you feel you have received this communication in error or would no longer like to receive these types of communications, please e-mail [email or web address]

## 2017-08-04 NOTE — PROGRESS NOTES
Subjective:       Patient ID: Griffin Hall is a 72 y.o. male.    Chief Complaint: States here for an ENT check up.; History of head and neck cancer treated at [facility] in 2006; and Dysphagia    Pt known to me with hx of H&N cancer presenting with a neck mass in 2006 for which he was evaluated and treated at [facility].  These records are not available, but apparently he had an unknown primary and received primary XRT followed by neck dissection for residual disease.  He had a chest mass a few years ago for which he refused eval with biopsy, but was apparently inflammatory in nature and eventually cleared.  He does see his pulmonologist regularly, and states his last visit was 6 weeks ago and all okay.  There is also a history of asbestos exposure according to the pt.  He has suffered with dysphagia and some VPI symptoms since his H&N cancer treatment.  He has had many prior swallow evaluations, but did not follow through with the recommended therapy after his last MBS in 2013.  At his last visit with me in 2015, another MBS was ordered but he did not follow through with obtaining the study.                  Review of Systems   Ears: Positive for ringing in ear and dizziness.  Negative for hearing loss, ear pain, ear pressure, ear discharge, ear infections, head trauma, taken gentramycin/streptomycin and family history of hearing loss.    Nose:  Positive for nasal or sinus surgery, loss of smell and postnasal drip. Negative for nasal obstruction.    Mouth/Throat: Positive for pain swallowing and impaired swallowing. Negative for throat mass and neck mass.   Constitutional: Negative for recent unexplained weight loss and fever.    Eyes:  Negative for visual change.   Cardiovascular:  Negative for chest pain and palpitations.   Respiratory:  Negative for asthma, emphysema, history of tuberculosis, recent cough and shortness of breath.    Gastrointestinal:  Negative for acid reflux and indigestion.   Other:  Positive for  arthritis, weakness, depression and anxiety. Negative for disturbances in coordination, slurred, swollen glands and anemia.           Objective:      Physical Exam   Constitutional: He is oriented to person, place, and time. No distress.   Pt is extremely thin and appears chronically ill and debilitated as he has been for several years.     HENT:   Head: Normocephalic and atraumatic.   Right Ear: External ear normal.   Left Ear: External ear normal.   Nose: Nose normal.   Mouth/Throat: Uvula is midline.   Post XRT change as before with thickened secretions and no mucosal lesions.     Eyes: Conjunctivae and EOM are normal. Pupils are equal, round, and reactive to light. Right eye exhibits no discharge. Left eye exhibits no discharge.   Neck: No JVD present. No tracheal deviation present. No thyromegaly present.   Examination of the neck demonstrates significant post XRT fibrosis as before and no masses.     Pulmonary/Chest: Effort normal. No stridor. No respiratory distress.   Lymphadenopathy:     He has no cervical adenopathy.   Neurological: He is alert and oriented to person, place, and time. He displays weakness.   Skin: Skin is warm and dry.   Psychiatric: His behavior is normal. His speech is not slurred.   Pt has suffered with depression and anxiety for years as well as spine and musculoskeletal problems with pain for many years.  He is upset about his meds and the prescribers and is to see a new pain management doctor soon and encouraged to follow through with this.           Assessment:       1. Dysphagia, unspecified type    2. History of head and neck cancer    3. History of radiation to head and neck region    4. Current smoker    5. Other dysphagia        Plan:       Discussed with pt MBS and states wants to proceed with this and order placed.  He was advised to follow up here after the study to review the results and recommendations.  He understands that tobacco use is a risk factor for H&N and other  cancers, as well as other diseases as per prior discussions, and the need to reduce with goal to discontinue use.  He is to continue follow up with all of his other physicians including his PCP, pulmonologist, H&N cancer specialist, pain management specialist.

## 2017-08-04 NOTE — PATIENT INSTRUCTIONS
Pt is to follow up after his swallow studies to review the results and recommendations.  He is to continue to follow up with his other physicians including his pulmonologist, H&N cancer specialist, pain management physician, PCP.  He understands that tobacco use is a risk factor for a variety of cancers including H&N cancer as well as other diseases as per prior conversations and the need to discontinue.

## 2017-08-04 NOTE — PROCEDURES
Laryngoscopy  Date/Time: 8/3/2017 3:29 PM  Performed by: NOY BALLESTEROS  Authorized by: NOY BALLESTEROS.     Consent Done?:  Yes (Verbal)  Anesthesia:     Local anesthetic:  Lidocaine 2% and Anegl-Synephrine 1/2%    Patient tolerance:  Patient tolerated the procedure well with no immediate complications    Decongestion performed?: Yes    Laryngoscopy:     Areas examined:  Nasal cavities, nasopharynx, oropharynx, hypopharynx, larynx and vocal cords  Larynx/hypopharynx:      Equal and normal bilateral     Post XRT changes noted as before as well as thickened secretions with no evidence of mucosal lesions.

## 2017-08-07 ENCOUNTER — SURGERY (OUTPATIENT)
Age: 72
End: 2017-08-07

## 2017-08-07 ENCOUNTER — OFFICE VISIT (OUTPATIENT)
Dept: PAIN MEDICINE | Facility: CLINIC | Age: 72
End: 2017-08-07
Payer: MEDICARE

## 2017-08-07 ENCOUNTER — TELEPHONE (OUTPATIENT)
Dept: SPEECH THERAPY | Facility: HOSPITAL | Age: 72
End: 2017-08-07

## 2017-08-07 ENCOUNTER — TELEPHONE (OUTPATIENT)
Dept: PAIN MEDICINE | Facility: CLINIC | Age: 72
End: 2017-08-07

## 2017-08-07 ENCOUNTER — HOSPITAL ENCOUNTER (OUTPATIENT)
Facility: OTHER | Age: 72
Discharge: HOME | End: 2017-08-07
Attending: ANESTHESIOLOGY | Admitting: ANESTHESIOLOGY
Payer: MEDICARE

## 2017-08-07 VITALS
SYSTOLIC BLOOD PRESSURE: 116 MMHG | RESPIRATION RATE: 20 BRPM | WEIGHT: 130.31 LBS | HEART RATE: 65 BPM | BODY MASS INDEX: 18.24 KG/M2 | TEMPERATURE: 98 F | DIASTOLIC BLOOD PRESSURE: 74 MMHG | HEIGHT: 71 IN

## 2017-08-07 VITALS
OXYGEN SATURATION: 97 % | SYSTOLIC BLOOD PRESSURE: 133 MMHG | RESPIRATION RATE: 18 BRPM | HEIGHT: 72 IN | HEART RATE: 70 BPM | DIASTOLIC BLOOD PRESSURE: 71 MMHG | TEMPERATURE: 98 F | WEIGHT: 140 LBS | BODY MASS INDEX: 18.96 KG/M2

## 2017-08-07 DIAGNOSIS — G89.29 CHRONIC PAIN: ICD-10-CM

## 2017-08-07 DIAGNOSIS — F11.20 OPIOID TYPE DEPENDENCE, CONTINUOUS USE: Primary | ICD-10-CM

## 2017-08-07 DIAGNOSIS — M47.22 OSTEOARTHRITIS OF SPINE WITH RADICULOPATHY, CERVICAL REGION: Primary | ICD-10-CM

## 2017-08-07 PROCEDURE — 99214 OFFICE O/P EST MOD 30 MIN: CPT | Mod: S$PBB,,, | Performed by: PSYCHIATRY & NEUROLOGY

## 2017-08-07 PROCEDURE — 3008F BODY MASS INDEX DOCD: CPT | Mod: ,,, | Performed by: PSYCHIATRY & NEUROLOGY

## 2017-08-07 PROCEDURE — 1125F AMNT PAIN NOTED PAIN PRSNT: CPT | Mod: ,,, | Performed by: PSYCHIATRY & NEUROLOGY

## 2017-08-07 PROCEDURE — 62321 NJX INTERLAMINAR CRV/THRC: CPT | Mod: ,,, | Performed by: ANESTHESIOLOGY

## 2017-08-07 PROCEDURE — 99213 OFFICE O/P EST LOW 20 MIN: CPT | Mod: PBBFAC | Performed by: PSYCHIATRY & NEUROLOGY

## 2017-08-07 PROCEDURE — 1159F MED LIST DOCD IN RCRD: CPT | Mod: ,,, | Performed by: PSYCHIATRY & NEUROLOGY

## 2017-08-07 PROCEDURE — 99152 MOD SED SAME PHYS/QHP 5/>YRS: CPT | Mod: ,,, | Performed by: ANESTHESIOLOGY

## 2017-08-07 PROCEDURE — 99999 PR PBB SHADOW E&M-EST. PATIENT-LVL III: CPT | Mod: PBBFAC,,, | Performed by: PSYCHIATRY & NEUROLOGY

## 2017-08-07 RX ORDER — LIDOCAINE HYDROCHLORIDE 10 MG/ML
INJECTION, SOLUTION EPIDURAL; INFILTRATION; INTRACAUDAL; PERINEURAL
Status: DISCONTINUED | OUTPATIENT
Start: 2017-08-07 | End: 2017-08-07 | Stop reason: HOSPADM

## 2017-08-07 RX ORDER — DEXAMETHASONE SODIUM PHOSPHATE 100 MG/10ML
INJECTION INTRAMUSCULAR; INTRAVENOUS
Status: DISCONTINUED | OUTPATIENT
Start: 2017-08-07 | End: 2017-08-07 | Stop reason: HOSPADM

## 2017-08-07 RX ORDER — MIDAZOLAM HYDROCHLORIDE 1 MG/ML
INJECTION INTRAMUSCULAR; INTRAVENOUS
Status: DISCONTINUED | OUTPATIENT
Start: 2017-08-07 | End: 2017-08-07 | Stop reason: HOSPADM

## 2017-08-07 RX ORDER — FENTANYL CITRATE 50 UG/ML
INJECTION, SOLUTION INTRAMUSCULAR; INTRAVENOUS
Status: DISCONTINUED | OUTPATIENT
Start: 2017-08-07 | End: 2017-08-07 | Stop reason: HOSPADM

## 2017-08-07 RX ORDER — LIDOCAINE HYDROCHLORIDE 10 MG/ML
INJECTION INFILTRATION; PERINEURAL
Status: DISCONTINUED | OUTPATIENT
Start: 2017-08-07 | End: 2017-08-07 | Stop reason: HOSPADM

## 2017-08-07 RX ORDER — SODIUM CHLORIDE 9 MG/ML
500 INJECTION, SOLUTION INTRAVENOUS CONTINUOUS
Status: DISCONTINUED | OUTPATIENT
Start: 2017-08-07 | End: 2017-08-07 | Stop reason: HOSPADM

## 2017-08-07 RX ADMIN — MIDAZOLAM HYDROCHLORIDE 2 MG: 1 INJECTION, SOLUTION INTRAMUSCULAR; INTRAVENOUS at 09:08

## 2017-08-07 RX ADMIN — LIDOCAINE HYDROCHLORIDE 10 ML: 10 INJECTION, SOLUTION EPIDURAL; INFILTRATION; INTRACAUDAL; PERINEURAL at 09:08

## 2017-08-07 RX ADMIN — DEXAMETHASONE SODIUM PHOSPHATE 10 MG: 10 INJECTION INTRAMUSCULAR; INTRAVENOUS at 09:08

## 2017-08-07 RX ADMIN — SODIUM CHLORIDE 500 ML: 0.9 INJECTION, SOLUTION INTRAVENOUS at 09:08

## 2017-08-07 RX ADMIN — FENTANYL CITRATE 25 MCG: 50 INJECTION, SOLUTION INTRAMUSCULAR; INTRAVENOUS at 09:08

## 2017-08-07 RX ADMIN — SODIUM BICARBONATE 1 ML: 0.2 INJECTION, SOLUTION INTRAVENOUS at 09:08

## 2017-08-07 RX ADMIN — LIDOCAINE HYDROCHLORIDE 10 ML: 10 INJECTION, SOLUTION INFILTRATION; PERINEURAL at 09:08

## 2017-08-07 RX ADMIN — IOHEXOL 5 ML: 300 INJECTION, SOLUTION INTRAVENOUS at 09:08

## 2017-08-07 NOTE — BRIEF OP NOTE
Discharge Diagnosis:Cervical radiculopathy [M54.12]  Condition on Discharge: Stable.  Diet on Discharge: Same as before.  Activity: as per instruction sheet.  Discharge to: Home with a responsible adult.  Follow up: as per Discharge instructions

## 2017-08-07 NOTE — TELEPHONE ENCOUNTER
Patient daughter came in today for her father to  his medication for Oxycodone and Xanax. Patient is in procedure with Dr. Dietz.

## 2017-08-07 NOTE — INTERVAL H&P NOTE
The patient has been examined and the H&P has been reviewed:    I concur with the findings and no changes have occurred since H&P was written.    Anesthesia/Surgery risks, benefits and alternative options discussed and understood by patient/family.      No change in the location or quality of the pain since the most recent clinic visit.  No new symptoms.  He wishes to proceed with the procedure today.    PE, unchanged from previous:  CV:  RRR  Resp: unlabored, no wheezing.    NPO since MN.    Proceed with C7-T1 IL VALERI.    Active Hospital Problems    Diagnosis  POA    Chronic pain [G89.29]  Yes      Resolved Hospital Problems    Diagnosis Date Resolved POA   No resolved problems to display.

## 2017-08-07 NOTE — OP NOTE
Cervical Interlaminar Epidural Steroid Injection under fluoroscopic guidance.  Time-out taken to identify patient and procedure side prior to starting the procedure.   I attest that I have reviewed the patient's home medications prior to the procedure and no contraindication have been identified. I  re-evaluated the patient after the patient was positioned for the procedure in the procedure room immediately before the procedural time-out. The vital signs are current and represent the current state of the patient which has not significantly changed since the preprocedure assessment.                                                              Date of Service: 08/07/2017    PCP: Luz Claudio MD      Procedure: T1-T2 cervical interlaminar epidural steroid injection under fluoroscopy. Did not perform C7/T1 due to surgical hardware  Reasons for procedure: Cervical radiculopathy [M54.12]  Physician: Noel Dietz MD  ASSISTANTS: Sheron De Los Santos MD    Medications injected:  Preservative-free dexamethasone 10mg and Xylocaine 1% MPF 1ml.  This was followed by a slow injection of 4 mL sterile, preservative-free normal saline.    Local anesthetic used: Xylocaine 1% 9ml with Sodium Bicarbonate 1ml.     Sedation Medications: Versed 2mg IV, Fentanyl 25mcg IV    Complications:  none.    Estimated blood loss: none.    Technique:  With the patient laying in a prone position with the neck in a flexed forward position, the area was prepped and draped in the usual sterile fashion using ChloraPrep and a fenestrated drape.  The area was determined under fluoroscopic guidance.  Local anesthetic was given using a 27-gauge needle by raising a wheal and going down to the osseous elements of the cervical spine.  A 3.5 inch 20-gauge Touhy needle was introduced under fluoroscopic guidance to meet the lamina of T2.  The needle was then hinged under the lamina then advanced using loss of resistance technique.  Once the tip of the needle was in  the desired position, the contrast dye Omnipaque was injected to determine placement and no vascular runoff.  Digital subtraction was employed to confirm that there is no vascular runoff.  The steroid was then injected slowly followed by a slow injection of the 4 mL of the sterile preservative-free normal saline.  The patient tolerated the procedure well.    Pain before the procedure: 10/10    Pain after the procedure: 8/10    The patient was monitored after the procedure and was given post-procedure and discharge instructions to follow at home. The patient was discharged in a stable condition

## 2017-08-07 NOTE — PROGRESS NOTES
"Outpatient Psychiatry Follow-Up Visit (MD/NP)    8/7/2017    Clinical Status of Patient:  Outpatient (Ambulatory)    Chief Complaint:  Griffin Hall is a 72 y.o. male who presents today for follow-up of anxiety and opioid and benzodiazepine dependence.  Met with patient.      Interval History and Content of Current Session:  Interim Events/Subjective Report/Content of Current Session: Pt reports that he just got discharged from the Procedure area after his appointment with Dr Dietz for an VALERI. That the procedure was "OK". That he is still in a lot of pain and is taking his medications only as precribed. That he is not having any issues with them at this time. Is communicating with his children and family more. Reports no side effects from his medications.     Psychotherapy:  · Target symptoms: opioid dependence, benzodiazepine dependence  · Why chosen therapy is appropriate versus another modality: relevant to diagnosis, patient responds to this modality  · Outcome monitoring methods: self-report, observation, checklist/rating scale  · Therapeutic intervention type: behavior modifying psychotherapy, supportive psychotherapy  · Topics discussed/themes: stress related to medical comorbidities, difficulty managing affect in interpersonal relationships, building skills sets for symptom management, symptom recognition  · The patient's response to the intervention is accepting. The patient's progress toward treatment goals is fair.   · Duration of intervention: 30 minutes.    Review of Systems   · PSYCHIATRIC: Pertinant items are noted in the narrative.    Past Medical, Family and Social History: The patient's past medical, family and social history have been reviewed and updated as appropriate within the electronic medical record - see encounter notes.    Compliance: yes    Side effects: None    Risk Parameters:  Patient reports no suicidal ideation  Patient reports no homicidal ideation  Patient reports no " "self-injurious behavior  Patient reports no violent behavior    Exam (detailed: at least 9 elements; comprehensive: all 15 elements)   Constitutional  Vitals:  Most recent vital signs, dated less than 90 days prior to this appointment, were reviewed.   Vitals:    08/07/17 1058   BP: 116/74   Pulse: 65   Resp: 20   Temp: 98.1 °F (36.7 °C)   TempSrc: Oral   Weight: 59.1 kg (130 lb 4.7 oz)   Height: 5' 11" (1.803 m)        General:  age appropriate, casually dressed, neatly groomed     Musculoskeletal  Muscle Strength/Tone:  no tremor, no tic   Gait & Station:  non-ataxic     Psychiatric  Speech:  no latency; no press   Mood & Affect:  "tired today"  congruent and appropriate   Thought Process:  normal and logical, goal-directed   Associations:  intact   Thought Content:  normal, no suicidality, no homicidality, delusions, or paranoia   Insight:  intact   Judgement: behavior is adequate to circumstances   Orientation:  grossly intact   Memory: intact for content of interview   Language: grossly intact   Attention Span & Concentration:  able to focus   Fund of Knowledge:  intact and appropriate to age and level of education     Assessment and Diagnosis   Status/Progress: Based on the examination today, the patient's problem(s) is/are inadequately controlled.  New problems have been presented today.   Co-morbidities are complicating management of the primary condition.  There are no active rule-out diagnoses for this patient at this time.            Impression: Pt is a 70 Y/O CM with PMHx sig for H/O head and neck cancer, chronic pain of both shoulders, cervical stenosis of spinal cord with           Opioid dependence in a controlled environment.  Benzodiazepine dependence   BETTYE              Strengths and Liabilities: Strength: Patient accepts guidance/feedback, Strength: Patient is expressive/articulate., Strength: Patient is intelligent., Strength: Patient is motivated for change., Strength: Patient has positive " support network., Strength: Patient has reasonable judgment.      Treatment Goals: Specify outcomes written in observable, behavioral terms:   Anxiety: acquiring relapse prevention skills, eliminating all anxiety symptoms (SCL-90-R scores in normal range), reducing negative automatic thoughts, reducing physical symptoms of anxiety and reducing time spent worrying (<30 minutes/day)  Safety: to take medications only as prescribed by his MDs      Treatment Plan/Recommendations:   · Medication Management: Continue current medications. The risks and benefits of medication were discussed with the patient.  · Referral for further treatment to social work team for psychotherapy  · The treatment plan and follow up plan were reviewed with the patient.   · Pt will continue to F/U with Dr Dietz. .  · Will cont the Roxicodone 30 mg upto qid prn pain, will increase quantity to# 90 Not able to taper at this time. Will cont to assess.   · Cont the xanax 2 mg upto twice daily, pt reports it also helps as a muscle relaxant.  · Will cont to monitor  · Reviewed LA .  · Discussed chronic pain rehabilitation.  · Discussed coping skills.         Get UDS as needed.       Return to Clinic: 1 month.

## 2017-08-07 NOTE — PLAN OF CARE
Pt tolerated procedure well. Reports 8 /10 pain after procedure. Pt assisted up for first time, steady on feet. D/c instructions given.

## 2017-08-07 NOTE — DISCHARGE INSTRUCTIONS

## 2017-08-07 NOTE — H&P (VIEW-ONLY)
Chronic Pain - Follow Up     Referring Physician: Ayden Almonte MD     Chief Complaint   Patient presents with    Neck Pain    Low-back Pain         SUBJECTIVE: Disclaimer: This note has been generated using voice-recognition software. There may be typographical errors that have been missed during proof-reading    Interval History 7/26/2017:  The patient returns to clinic today for follow up. He continues to complain of neck pain that radiates into his arms to his elbows bilaterally. He did see Dr. Willis but did not wish to proceed with injections at his office. He reports that he is more comfortable here at [facility]. He is interested in procedures here. He denies any weakness to his arms. He continues to follow up with Dr. Trent. He continues to take Roxicodone 30 mg TID with benefit. He denies any adverse effects from this medication. He denies any other health changes. His pain today is 10/10.    Interval History 6/27/2017:  The patient returns to clinic today for follow up. He continues to follow up with Dr. Browne. He continues to complain of neck pain that radiates into his arms to his elbows bilaterally. He does report seeking an opinion from Dr. Donnie Willis in [place] and is schedule for cervical injections in the next few weeks. He continues to report low back pain as well. He did see cardiology recently and is no longer on Plavix.   He continues to take Roxicodone 30 mg TID with benefit. He denies any other health changes. His pain today is 8/10.    Interval History 2/2/2017:  Continues to f/u with Dr. Browne, had an injury and had MRI at [facility], but results not available today, but copies were given to Dr. Browne, waiting for them to be scanned into the system.   Gabapentin caused dizziness so this was discontinued before an effective dose could be found.  Has not tried Lyrica in the past.    Interval history 11/18/2016:  Since previous encounter the patient has been receiving  "oxycodone 30 mg tablets 3-4 per day from multiple different physicians, he has not had any interventions or any other adjuvant medications added to his pain regimen.  Additionally the patient has had a recent MI in June with a drug-eluting stent placed and is now on Plavix.  He continues to endorse neck pain with bilateral upper extremity radicular symptoms is his worst pain.  He has had a previous ACDF and has a CT scan of the cervical spine which shows moderate neuroforaminal stenosis at C4-5 which would correspond to his shoulder pain bilaterally.  He has not been evaluated by orthopedics.   he previously did not follow my recommendations and is still not under psychiatric treatment.      Initial encounter:     Griffin Hall presents to the clinic for the evaluation of neck pain. The pain started 15 years ago following cervical fusion and symptoms have been worsening.     Brief history: Patient reports a history squamous carcinoma of the neck in 2006 which was resected in [facility] he states that he received 6 weeks of radiation treatment prior to surgical resection but did not have any chemotherapy treatments and reports that he has not been in follow-up by oncology. he states that he has been getting all of his treatment from his family practitioner Dr. Barry Keene. He states that Dr. Saunders is retiring and sent him to a new practitioner although he does not recall the name of the new practitioner and he had one encounter with the practitioner who referred him to pain management to resume his opioid prescriptions. The patient has been taking oxycodone 30 mg tablets he states 3-4 per day and was being given on average 130 tablets per month. His last prescription was provided according to  on 5/26/2015, and he states that he is out of his medication. He could not account for the reason why he is out early although he offered multiple explanations "sometimes you lose some, sometimes you " "step on some, I'm not sure what happened in this case, but I assure you I am not a drug addict"      Pain Description:     The pain is located in the neck upper back area and radiates to the across the chest.      At BEST 5/10      At WORST  7/10 on the WORST day.      On average pain is rated as 4/10.      Today the pain is rated as 5/10     The pain is described as aching, burning, numbing, sharp, shooting, tight band and deep       Symptoms interfere with daily activity, sleeping and work.      Exacerbating factors: Standing, Bending, Touching, Coughing/Sneezing, Eating, Walking, Night Time, Morning, Extension, Flexing and Lifting.      Mitigating factors heat, laying down, medications, rest and medications and injections (although he did not specify which injections he has had in the past).      Patient denies urinary incontinence and bowel incontinence. Patient has been having decreased appetite treated with Megace     Patient denies any suicidal or homicidal ideations     Pain Medications:  Current:  Oxycodone 30mg 3-4 /day     Tried in Past:  NSAIDs -Never  TCA -Never  SNRI -Never  Anti-convulsants -gabapentin causes dizziness.  Muscle Relaxants -Never  Opioids-Never     Physical Therapy/Home Exercise: no       report: Reviewed and consistent with medication use as prescribed.     Pain Procedures:   ESIs in the past (1994)     Chiropractor -never  Acupuncture - never  TENS unit -never  Spinal decompression -Cervical fusion  Joint replacement -never    Imaging:   MRI Cervical Spine 1/19/2017:  Stable anterior and interbody fusion of C5-6. 2 mm anterolisthesis at C4 which were not present on the prior study. Spondylosis at multiple cervical levels which has worsened since the prior study in 2012.     C2-3: A posterior disc protrusion is present causing moderate spinal stenosis, effacement of the anterior thecal sac and flattening of the anterior cervical cord.     C3-4: Posterior disc protrusion which causes " moderate spinal stenosis, effacement of the anterior thecal sac, and flattening of the anterior cervical cord.     C4-5: Posterior disc osteophyte complex which has worsened significantly since the prior study and now causes moderate spinal stenosis, effacement of the anterior thecal sac, and flattening of the anterior cervical cord.    C6-7: Posterior disc osteophyte complex is stable when compared to the prior study. Thickening of the ligamentum flavum has worsened. Moderate spinal stenosis with effacement of the anterior thecal sac. Moderate to severe right-sided neural foraminal stenosis with suspected impingement of the exiting nerve root.       MRI Thoracic Spine 1/19/2017:  An exaggerated thoracic kyphosis. Old superior endplate compression fracture to the T4 vertebral body. Superior endplate Schmorl's nodes are present at T1 and T2. Disc bulge at T3-4 which does not cause significant spinal or neural foraminal stenosis. Thick potentially calcified pleural plaques are present bilaterally along with airspace disease, atelectasis, and pulmonary nodularity. Dedicated imaging of the chest is recommended if this has not already been done.     MRI Lumbar Spine 1/19/2017:  A superior endplate compression fracture is present to the L2 vertebral body which has healed with a residual 30% loss of height. 2 mm retrolisthesis at L2.     L1-2: Annular bulge without spinal or neural foraminal stenosis.     L2-3 Annular bulge with mild spinal stenosis and no neural foraminal stenosis.    L3-4: Annular bulge with mild spinal stenosis and mild bilateral neural foraminal stenosis.     L4-5: Annular bulge and small posterior annular tear seen on image 11 of series 3. An annular defect is present to the anterior right lateral portion of the disc and was not seen on the prior study. Mild spinal stenosis and moderate bilateral neural foraminal stenosis.     L5-S1: Annular bulge with no spinal stenosis and mild left-sided neural  foraminal stenosis.          REVIEW OF SYSTEMS:     GENERAL:  Weight loss and decreased appetite  HEENT:  No recent changes in vision or hearing  NECK:  difficulty with swallowing.  RESPIRATORY: Negative for cough, wheezing or shortness of breath, patient denies any recent URI.  CARDIOVASCULAR: Negative for chest pain, leg swelling or palpitations.  GI: Negative for abdominal discomfort, blood in stools or black stools or change in bowel habits.  MUSCULOSKELETAL: See HPI.  SKIN: Negative for lesions, rash, and itching.  PSYCH:  Depression and anxiety treated with citalopram, Wellbutrin, alprazolam.  Patient's sleep is disturbed secondary to pain.  HEMATOLOGY/LYMPHOLOGY: Negative for prolonged bleeding, bruising easily or swollen nodes.   ENDO: No history of diabetes or thyroid dysfunction  NEURO: No history of headaches, syncope, paralysis, seizures or tremors.  All other reviewed and negative other than HPI.     OBJECTIVE:    /64   Pulse 67   Temp 97.9 °F (36.6 °C) (Oral)   Resp 18   Ht 6' (1.829 m)   Wt 61.4 kg (135 lb 5.8 oz)   BMI 18.36 kg/m²     PHYSICAL EXAMINATION:    GENERAL: Well appearing, in no acute distress, alert and oriented x3.  PSYCH:  Mood and affect appropriate.  SKIN: Skin color, texture, turgor normal, no rashes or lesions.  HEAD/FACE:  Normocephalic, atraumatic. Cranial nerves grossly intact.  NECK: Pain to palpation over the cervical paraspinous muscles. Spurling positive. Pain with neck flexion, extension, and lateral flexion. Very limited range of motion in the neck.   CV: RRR with palpation of the radial artery.  PULM: No evidence of respiratory difficulty, symmetric chest rise.  EXTREMITIES: Peripheral joint ROM is full and pain free without obvious instability or laxity in all four extremities. No deformities, edema, or skin discoloration. Good capillary refill.  MUSCULOSKELETAL: Shoulder provocative maneuvers are cause pain.  Bilateral upper extremity strength is normal and  symmetric.  No atrophy or tone abnormalities are noted.  NEURO: Bilateral upper extremity coordination and muscle stretch reflexes are physiologic and symmetric.  Darren's negative. No clonus.  No loss of sensation is noted.  GAIT: Antalgic, ambulates without assistance      ASSESSMENT: 70 y.o. year old male with pain, consistent with      Encounter Diagnoses   Name Primary?    Cervical stenosis of spinal canal Yes    Cervical radiculopathy     DDD (degenerative disc disease), cervical     Chronic pain disorder       PLAN:     - Previous imaging was reviewed and discussed with the patient today.    - He would like to switch providers in the clinic. I discussed his case with Dr. Dietz who will assume care.     - Schedule for C7-T1 IL VALERI. The procedure, risks, benefits and options were discussed with patient. There are no contraindications to the procedure. The patient expressed understanding and agreed to proceed.  Consent obtained today.    - In the future, we may consider lumbar VALERI.     - Continue to follow up with Dr. Browne.     - Continue Roxicodone 30 mg TID.     - RTC 2 weeks after above procedure.     - Dr. Dietz was consulted on the patient and agrees with this plan.    The above plan and management options were discussed at length with patient. Patient is in agreement with the above and verbalized understanding.     Janis Loera NP  07/26/2017

## 2017-08-22 ENCOUNTER — OFFICE VISIT (OUTPATIENT)
Dept: PAIN MEDICINE | Facility: CLINIC | Age: 72
End: 2017-08-22
Attending: ANESTHESIOLOGY
Payer: MEDICARE

## 2017-08-22 VITALS
HEIGHT: 71 IN | TEMPERATURE: 98 F | DIASTOLIC BLOOD PRESSURE: 67 MMHG | RESPIRATION RATE: 18 BRPM | WEIGHT: 136 LBS | SYSTOLIC BLOOD PRESSURE: 108 MMHG | BODY MASS INDEX: 19.04 KG/M2 | HEART RATE: 64 BPM

## 2017-08-22 DIAGNOSIS — M47.812 DJD (DEGENERATIVE JOINT DISEASE), CERVICAL: ICD-10-CM

## 2017-08-22 DIAGNOSIS — S32.020D CLOSED COMPRESSION FRACTURE OF L2 LUMBAR VERTEBRA, WITH ROUTINE HEALING, SUBSEQUENT ENCOUNTER: ICD-10-CM

## 2017-08-22 DIAGNOSIS — M54.12 CERVICAL RADICULOPATHY: ICD-10-CM

## 2017-08-22 DIAGNOSIS — M47.26 OSTEOARTHRITIS OF SPINE WITH RADICULOPATHY, LUMBAR REGION: Primary | ICD-10-CM

## 2017-08-22 PROCEDURE — 99213 OFFICE O/P EST LOW 20 MIN: CPT | Mod: PBBFAC | Performed by: ANESTHESIOLOGY

## 2017-08-22 PROCEDURE — 1125F AMNT PAIN NOTED PAIN PRSNT: CPT | Mod: ,,, | Performed by: ANESTHESIOLOGY

## 2017-08-22 PROCEDURE — 99999 PR PBB SHADOW E&M-EST. PATIENT-LVL III: CPT | Mod: PBBFAC,,, | Performed by: ANESTHESIOLOGY

## 2017-08-22 PROCEDURE — 1159F MED LIST DOCD IN RCRD: CPT | Mod: ,,, | Performed by: ANESTHESIOLOGY

## 2017-08-22 PROCEDURE — 99213 OFFICE O/P EST LOW 20 MIN: CPT | Mod: S$PBB,,, | Performed by: ANESTHESIOLOGY

## 2017-08-22 RX ORDER — CLOPIDOGREL BISULFATE 75 MG/1
TABLET ORAL
Status: ON HOLD | COMMUNITY
Start: 2017-07-01 | End: 2017-09-14

## 2017-08-22 RX ORDER — NEPAFENAC 3 MG/ML
SUSPENSION/ DROPS OPHTHALMIC
COMMUNITY
Start: 2017-06-27 | End: 2018-01-16

## 2017-08-22 RX ORDER — DUREZOL 0.5 MG/ML
EMULSION OPHTHALMIC
COMMUNITY
Start: 2017-06-27 | End: 2018-01-16

## 2017-08-22 RX ORDER — CELECOXIB 200 MG/1
CAPSULE ORAL
Refills: 0 | COMMUNITY
Start: 2017-05-31 | End: 2018-05-25

## 2017-08-22 NOTE — PROGRESS NOTES
Subjective:      Patient ID: Griffin Hall is a 72 y.o. male.    Chief Complaint: Mid-back Pain (Follow up after procedure )    Referred by: No ref. provider found     Pain Scales  Best: 8/10  Worst: 9/10  Usually: 8/10  Today: 6/10    Mid-back Pain   Pertinent negatives include no chest pain, fever, headaches or weight loss.     Here for follow-up.  His neck pain is feeling much better since the epidural steroid injection that area.  He wants to proceed with a lumbar epidural.  He has bilateral radicular symptoms left worse than the right.  No new symptomatology.  Has an MRI from February 2017.  We have the report but not the actual images.  Shows multilevel degenerative disease and L2 fracture.    Past Medical History:   Diagnosis Date    Anxiety     Cancer     Degenerative disc disease     Depression     Tobacco use 6/16/2016       Past Surgical History:   Procedure Laterality Date    CERVICAL FUSION N/A     THROAT SURGERY         Review of patient's allergies indicates:   Allergen Reactions    Codeine Itching       Current Outpatient Prescriptions   Medication Sig Dispense Refill    albuterol (VENTOLIN HFA) 90 mcg/actuation inhaler Inhale 2 puffs into the lungs every 6 (six) hours as needed for Wheezing. 1 Inhaler 6    alprazolam (XANAX) 2 MG Tab Take 1 tablet (2 mg total) by mouth 2 (two) times daily as needed. 60 tablet 0    aspirin (ECOTRIN) 81 MG EC tablet Take 1 tablet (81 mg total) by mouth once daily. 90 tablet 3    atorvastatin (LIPITOR) 20 MG tablet TAKE 1 TABLET BY MOUTH EVERY DAY 90 tablet 3    benazepril (LOTENSIN) 5 MG tablet TAKE 1 TABLET BY MOUTH EVERY DAY 90 tablet 3    buPROPion (WELLBUTRIN SR) 200 MG TbSR       carisoprodol (SOMA) 350 MG tablet   0    celecoxib (CELEBREX) 200 MG capsule   0    clopidogrel (PLAVIX) 75 mg tablet       dextroamphetamine-amphetamine 10 mg Tab once daily.  0    DUREZOL 0.05 % Drop ophthalmic solution       fluticasone (FLONASE) 50 mcg/actuation  nasal spray       furosemide (LASIX) 20 MG tablet Take 1 tablet (20 mg total) by mouth daily as needed. 30 tablet 3    gabapentin (NEURONTIN) 300 MG capsule Take 1 pill at before bed x 7 days, followed by 1 pill in morning and night for 7 days, followed by 1 pill morning afternoon and night 90 capsule 11    ILEVRO 0.3 % DrpS       metoprolol succinate (TOPROL-XL) 50 MG 24 hr tablet TAKE 1 TABLET BY MOUTH EVERY DAY 90 tablet 3    oxycodone (ROXICODONE) 30 MG Tab Take 1 tablet (30 mg total) by mouth 3 (three) times daily as needed. To be filled on 4/30/17 90 tablet 0    pregabalin (LYRICA) 50 MG capsule Take 1 pill at nightx 7 days, followed by 1 pill in morning and night for 7 days, followed by 1 pill morning afternoon and night 90 capsule 6     No current facility-administered medications for this visit.        Family History   Problem Relation Age of Onset    Cancer Mother     Cancer Father     Cancer Maternal Aunt     Cancer Maternal Uncle     Cancer Paternal Uncle        Social History     Social History    Marital status:      Spouse name: N/A    Number of children: N/A    Years of education: N/A     Occupational History    Not on file.     Social History Main Topics    Smoking status: Current Every Day Smoker     Packs/day: 2.00     Years: 49.00     Types: Cigarettes     Start date: 1/1/1967    Smokeless tobacco: Never Used      Comment: 7/2016 1ppd, down from 2ppd    Alcohol use No    Drug use: No    Sexual activity: Yes     Partners: Male     Other Topics Concern    Not on file     Social History Narrative    From [facility]         Review of Systems   Constitution: Negative for chills, fever, malaise/fatigue, weight gain and weight loss.   HENT: Negative for ear pain, headaches and hoarse voice.    Eyes: Negative for blurred vision, pain and visual disturbance.   Cardiovascular: Negative for chest pain, dyspnea on exertion and irregular heartbeat.  "  Respiratory: Negative for cough, shortness of breath and wheezing.    Endocrine: Negative for cold intolerance and heat intolerance.   Hematologic/Lymphatic: Negative for adenopathy and bleeding problem. Does not bruise/bleed easily.   Skin: Negative for color change, itching and rash.   Musculoskeletal: Positive for back pain.   Gastrointestinal: Negative for change in bowel habit, diarrhea, hematemesis, hematochezia, melena and vomiting.   Genitourinary: Negative for flank pain, frequency, hematuria and urgency.   Neurological: Negative for difficulty with concentration, dizziness, loss of balance and seizures.   Psychiatric/Behavioral: Negative for altered mental status, depression and suicidal ideas. The patient is not nervous/anxious.    Allergic/Immunologic: Negative for HIV exposure.           Objective:      /67   Pulse 64   Temp 98 °F (36.7 °C) (Oral)   Resp 18   Ht 5' 11" (1.803 m)   Wt 61.7 kg (136 lb 0.4 oz)   BMI 18.97 kg/m²   Normocephalic.  Atraumatic.  Affect appropriate.  Breathing unlabored.  Extra ocular muscles intact.      Ortho/SPM Exam    positive straight leg raise bilaterally.  Positive pain and tenderness in the lumbar spine.  Facet loading limited.  Spine hyperextension limited.  Assessment:       Encounter Diagnoses   Name Primary?    Osteoarthritis of spine with radiculopathy, lumbar region Yes    Cervical radiculopathy     DJD (degenerative joint disease), cervical     Closed compression fracture of L2 lumbar vertebra, with routine healing, subsequent encounter          Plan:       Griffin was seen today for mid-back pain.    Diagnoses and all orders for this visit:    Osteoarthritis of spine with radiculopathy, lumbar region    Cervical radiculopathy    DJD (degenerative joint disease), cervical    Closed compression fracture of L2 lumbar vertebra, with routine healing, subsequent encounter       We discussed with the patient the assessment and recommendations. The " following is the plan we agreed on:  1.  Continue current medications.  2.  Schedule patient for interlaminar epidural steroid injection at L4-5.  3.  Consider kyphoplasty at L2 should his symptomatology persist.  Consider reimaging if his pain persists or worsens.  4.  Return as needed.  Otherwise follow-up 2 weeks after the procedure.

## 2017-08-23 ENCOUNTER — TELEPHONE (OUTPATIENT)
Dept: SPEECH THERAPY | Facility: HOSPITAL | Age: 72
End: 2017-08-23

## 2017-08-23 ENCOUNTER — TELEPHONE (OUTPATIENT)
Dept: RADIOLOGY | Facility: HOSPITAL | Age: 72
End: 2017-08-23

## 2017-08-25 DIAGNOSIS — Z12.11 COLON CANCER SCREENING: ICD-10-CM

## 2017-08-30 ENCOUNTER — TELEPHONE (OUTPATIENT)
Dept: PAIN MEDICINE | Facility: CLINIC | Age: 72
End: 2017-08-30

## 2017-08-30 NOTE — TELEPHONE ENCOUNTER
----- Message from Venus Bustos sent at 8/30/2017  1:58 PM CDT -----  x 1st Request  _ 2nd Request  _ 3rd Request    Who: pt     Why: pt is requesting to speak to Andres in regards to follow up. Please call and advise.     What Number to Call Back: [phone]     When to Expect a call back: (Before the end of the day)  -- if call after 3:00 call back will be tomorrow.

## 2017-08-31 ENCOUNTER — TELEPHONE (OUTPATIENT)
Dept: RADIOLOGY | Facility: HOSPITAL | Age: 72
End: 2017-08-31

## 2017-08-31 ENCOUNTER — TELEPHONE (OUTPATIENT)
Dept: PAIN MEDICINE | Facility: CLINIC | Age: 72
End: 2017-08-31

## 2017-08-31 NOTE — TELEPHONE ENCOUNTER
----- Message from Juan Bolivar sent at 8/30/2017 10:57 AM CDT -----  Contact: Griffin Hall  _X  1st Request  _  2nd Request  _  3rd Request        Who: Griffin Hall    Why: Patient is having back pain. Please call back to follow up.    What Number to Call Back: [phone]    When to Expect a call back: (With in 24 hours)

## 2017-09-01 ENCOUNTER — HOSPITAL ENCOUNTER (OUTPATIENT)
Dept: RADIOLOGY | Facility: HOSPITAL | Age: 72
Discharge: HOME | End: 2017-09-01
Attending: OTOLARYNGOLOGY
Payer: MEDICARE

## 2017-09-01 ENCOUNTER — CLINICAL SUPPORT (OUTPATIENT)
Dept: SPEECH THERAPY | Facility: HOSPITAL | Age: 72
End: 2017-09-01
Attending: OTOLARYNGOLOGY
Payer: MEDICARE

## 2017-09-01 DIAGNOSIS — R13.10 DYSPHAGIA, UNSPECIFIED TYPE: Primary | ICD-10-CM

## 2017-09-01 DIAGNOSIS — Z85.89 HISTORY OF HEAD AND NECK CANCER: ICD-10-CM

## 2017-09-01 DIAGNOSIS — R13.10 DYSPHAGIA, UNSPECIFIED TYPE: ICD-10-CM

## 2017-09-01 PROCEDURE — G8996 SWALLOW CURRENT STATUS: HCPCS | Mod: GN,CK

## 2017-09-01 PROCEDURE — G8997 SWALLOW GOAL STATUS: HCPCS | Mod: GN,CJ

## 2017-09-01 PROCEDURE — G8998 SWALLOW D/C STATUS: HCPCS | Mod: GN,CK

## 2017-09-01 PROCEDURE — 92611 MOTION FLUOROSCOPY/SWALLOW: CPT | Mod: GN

## 2017-09-01 PROCEDURE — 74230 X-RAY XM SWLNG FUNCJ C+: CPT | Mod: TC

## 2017-09-01 PROCEDURE — 74230 X-RAY XM SWLNG FUNCJ C+: CPT | Mod: 26,GC,, | Performed by: RADIOLOGY

## 2017-09-01 NOTE — Clinical Note
Dr. Woods, Mr. Mina was seen for a MBSS on your referral. Please see full report in chart. Briefly: IMPRESSIONS:  Patient appears to present with 1. Oral pharyngeal dysphagia characterized by delayed swallow and generalized weakness throughout the swallow resulting in aspiration on thin and nectar thin liquids above 5 ml. 2. History of radical neck dissection 2/2 CA in 2005. RECOMMENDATIONS/PLAN OF CARE:  It is felt that Mr. Mina would benefit from   1.Continuing a regular diet consistency with thin liquids in small sips (teaspoon) AND following swallowing precautions listed in full report.  2. Take medications crushed when possible or with small sips of thin liquid or embedded in applesauce consistency foods.   3. Initiate dysphagia treatment to improve swallow function and safety.  Thank you for the referral Candis Pollock Speech Pathologist

## 2017-09-01 NOTE — PROGRESS NOTES
"MODIFIED BARIUM SWALLOW STUDY SPEECH PATHOLOGY REPORT    REASON FOR REFERRAL:  Griffin Hall, age 72 y.o., was referred by Summer Cardenas, otorhinolaryngologist,for a Modified Barium Swallow Study to rule out aspiration, assess his overall swallowing function, and determine safest consistencies for oral intake.  Medical history includes CA of neck 1996; radical neck discection 2005 with subsequent complaints of dysphagia and VPI symptoms. An MBSS was done in 2013 and recommendations were made, however patient did not follow through with recommendations. An MBSS was ordered in 2015, but patient did not follow through.     SWALLOWING HISTORY  Patient states he has had difficulty swallowing for years but that his symptoms have been worsening over the past 6 months. He describes the difficulty as not being able to manage his saliva, inability to swallow some solid foods resulting in coughing them up. He is able to eat beef as long as it is prime and tender. Reports nasal regurgitation of liquids and some solids (ie corn, lettuce, meat) about once a day. He stated, "In the middle of eating, it feels like I don't now how to swallow. Sometimes I panic."  Diet consistency at present is regular with thin liquids.    Medications are taken by mouth with water;pills are occasionally chewed.      MEDICAL HISTORY:  Past Medical History:   Diagnosis Date    Anxiety     Cancer     Degenerative disc disease     Depression     Tobacco use 6/16/2016        SURGICAL HISTORY:  Past Surgical History:   Procedure Laterality Date    CERVICAL FUSION N/A     THROAT SURGERY           FAMILY HISTORY:  Family History   Problem Relation Age of Onset    Cancer Mother     Cancer Father     Cancer Maternal Aunt     Cancer Maternal Uncle     Cancer Paternal Uncle         SOCIAL HISTORY:  Patient lives in [place]    BEHAVIOR:  Griffin Hall was a very pleasant gentleman who had normal affect and social interaction.  He was able " to fully cooperate during the study.  Results of today's assessment were considered indicative of his current levels of swallowing functioning.      HEARING:  Subjectively, within normal limits.     ORAL PERIPHERAL:   Informal examination of the oral mechanism revealed structures and functioning within normal limits for swallowing and speech purposes.    Voice quality was good. No wet or gurgled quality was appreciated before, during or after the study.    TEST FINDINGS:   Patient was seen in Radiology with the Radiologist for a Modified Barium Swallow Study and was seated on a chair for a left lateral videofluoroscopic view      Consistencies assessed using radiopaque barium contrast:  thin liquid (3ml 5 ml boluses via spoon and single and continuous swallows via open cup),   nectar thick liquid (3 ml and 5 ml boluses via spoon),  Honey thick liquid (3 ml via spoon),  thin puree (3 ml) via spoon,   thick puree,   solid (3 ml cracker boluses) by patient's hand   barium tablet embedded in applesauce.        Phases:  Oral:  Patient was able to obtain liquid and strip utensils adequately with no anterior loss of material from the oral cavity.  He moved boluses through the oral cavity with appropriate transit time.  There was no pooling of liquids in the mouth.  Swallow reflex was delayed with premature spilling as far as the valleculae and pyriform sinuses at times.     Pharyngeal: in general,  delayed initiation, appropriate soft palate elevation (no nasal regurgitation appreciated), decreased laryngeal elevation, decreased anterior hyoid excursion, absent epiglottic movement, inadequate laryngeal vestibular closure, decreased pharyngeal stripping wave, decreased PE segment opening due to decreased pressure, decreased tongue base retraction, and pharyngeal residue.      Thin liquids:   3 ml and 5 ml boluses: moved through the pharyngeal phase with residual in the valleculae and pyriform sinuses partially cleared with  second effortful swallow; laryngeal penetration to the TVF. Spontaneous cough elicited. Residue cleared from laryngeal vestibule.   Small sips from cup: laryngeal penetration to the TVF was not cleared and eventually trickle aspirated. Cough did not clear subglottic aspiration. Penetration 2/2 pooling in pyriform sinuses.  Chin tuck increased penetration.     Nectar thin liquids:  3 ml and 5 ml boluses: same as thin liquid    Honey thick liquids:  3 ml bolus: Increased difficulty evidenced by increased pooling in valleculae. Second and third swallow decreased residual in pharynx but did not clear it.     Thin puree (apple sauce): flash penetration. Cleared    Cracker w/ barium: increased pooling in valleculae. Decreased but not cleared with second and third swallow or effortful swallow.     Barium tablet: Severe stasis in the valleculae. Patient was given multiple small sips of thin liquid but could not clear the tablet even with effortful swallows.  At one point, he did not regulate the sips and took a large sip that required three swallows. He protected his airway sufficiently until after the third swallow. When his airway reopened, he aspirated a a few drops of the liquid that had pooled in the pyriform sinus. He coughed, but did not clear the subglottic aspiration.  The patient waited just outside the room for about twenty minutes. He returned and the tablet, though still seen in the valleculae had dissolved to less than 1/4 its original size.    Cervical Esophageal:  Boluses entered the upper esophagus with no obstruction noted.    Strategies:  Small sips and bites - reduced aspiration  Two and three swallows per bite - facilitated clearing pharyngeal residue.  Effortful swallow - facilitated clearing pharyngeal residue.  Thickened liquids - decreased penetration and aspiration, but increased pharyngeal residual  Chin tuck - not beneficial      Rosenbeck 8-point Penetration-Aspiration Scale:   Thin liquids and  nectar thick liquids 3 ml and 5 ml: 6 - Material enters the ariway, passes below the vocal folds, and is ejected into the larynx or out of the airway.   Thin liquids cup sips, large and small: 7 - Material enters the airway, passes below the vocal folds, and is not ejected from the trachea despite effort.  Honey thick, applesauce, cracker with barium and barium tablet: 1 - Material does not enter airway.    IMPRESSIONS:   Patient appears to present with  1. Oral pharyngeal dysphagia characterized by delayed swallow and generalized weakness throughout the swallow resulting in aspiration on thin and nectar thin liquids above 5 ml.  2. History of radical neck dissection 2/2 CA in 2005.  3. As required for Medicare G-codes/severity indicators, the patient's swallowing was rated as follows:     Swallowing  Current status:  FCM:  LEVEL 4: Swallowing is safe, but usually requires moderate cues to use compensatory strategies, and/or the individual has moderate diet restrictions and/or still requires tube feeding and/or oral supplements.   - CK  Projected status:  FCM:   LEVEL 5: Swallowing is safe with minimal diet restriction and/or occasionally requires minimal cueing to use compensatory strategies. The individual may occasionally self-cue. All nutrition and hydration needs are met by mouth at mealtime.   - CK  Discharge status:  FCM:   LEVEL 4: Swallowing is safe, but usually requires moderate cues to use compensatory strategies, and/or the individual has moderate diet restrictions and/or still requires tube feeding and/or oral supplements.   -  CK         RECOMMENDATIONS/PLAN OF CARE:   It is felt that Mr. Mina would benefit from    1.Continuing a regular diet consistency with thin liquids in small sips (teaspoon).      2. Compensatory strategies include: Upright posture for all oral intake and remain fully upright for at least 30 minutes to 1 hour after oral intake unless otherwise indicated by the  "physician or other healthcare provider. No head tilting upward or backward to eat/drink; if the head needs to be tilted upward/backward to get a sip from a bottle/glass/cup, then put only a small sip in the front of the mouth and lower the head to a level position before swallowing.   Take small sips/bites (1/2 to 1 teaspoon in size or as otherwise tolerated or preferred by the patient).   Do not inhale, talk, laugh, or make any sounds with the voice if swallowing something in the back of the mouth/throat.   Swallow each single sip/bite fully all the way through the throat/neck before another is put in the mouth.  Take additional two or three effortful "dry"/saliva swallows to help clear pooling in the throat.    Alternate food bites with small sips of liquid to clear food from the throat.    3. Take medications crushed when possible or with small sips of thin liquid or embedded in applesauce consistency foods.    4. Initiate dysphagia treatment to improve swallow function and safety.    5. Information will be mailed to the patient re: the above findings and recommendations.    6. Review of the swallow study results by the referring physician for further management of the patients concerns.    7. Contact [facility] at [phone] with any further questions or concerns.        Long-term goals:  Patient will be able to consume a regular diet with thin liquids with no signs/symptoms of aspiration.      Short-term objectives:  To be determined by treating clinician  "

## 2017-09-01 NOTE — TELEPHONE ENCOUNTER
"Contacted and spoke to patient regarding message, He " reports that he is having increased pain and had to increase his Oxycodone and requires pain medication." patient stated " he feels like he is in a concentration camp and we are the germans torturing him."     Staff apologized that patient was feeling like that, but that both the physicians that treat him are out of the clinic on Friday's.     He was informed that his medication is managed by Dr. Browne, his last refill was on 08/07/2017, his next refill should be due 09/06/2017 and as Dr. Browne is out of the office on Friday we are unable to approve his request.     He informed staff "that he had to increase his medication and Dr. Browne is aware that he does this as there are exceptions to every rule."    He asked if he should go to the Er?     He was informed by staff if feels that his pain is that great that he needs immediate help, then he should present to the Er.     Patient asked if the physician could contact them to inform the doctor in the ER what to give him.    He was informed again, that his doctor's are not in the clinic today and staff are not allowed to direct patient care. The ER will evaluate his current symptoms and treat him accordingly.    Patient verbalized understanding.    "

## 2017-09-01 NOTE — TELEPHONE ENCOUNTER
----- Message from Lisette Maguire sent at 9/1/2017  1:11 PM CDT -----  x_  1st Request  _  2nd Request  _  3rd Request        Who: roseanne    Why: pt. Returning phone call to speak with     What Number to Call Back:[phone]    When to Expect a call back: (With in 24 hours)

## 2017-09-05 NOTE — TELEPHONE ENCOUNTER
Contacted and spoke to patient, he was informed of physician response. He would need to come in and discuss any medication changes and to contact his ENT or Oncologist if his pain has increased due to his cancer.     Mr. Hall asked staff what type of doctor was Dr. Dietz?    He was informed that Dr. Dietz is a [facility] who specializes in pain management.     Mr. Ibarrayesi verbalized understanding after informing staff that they didn't hear the insult he stated to them.    Staff did not ask patient to repeat it.

## 2017-09-07 ENCOUNTER — OFFICE VISIT (OUTPATIENT)
Dept: PAIN MEDICINE | Facility: CLINIC | Age: 72
End: 2017-09-07
Payer: MEDICARE

## 2017-09-07 VITALS
WEIGHT: 131.63 LBS | HEART RATE: 64 BPM | TEMPERATURE: 98 F | RESPIRATION RATE: 17 BRPM | DIASTOLIC BLOOD PRESSURE: 73 MMHG | BODY MASS INDEX: 17.83 KG/M2 | HEIGHT: 72 IN | SYSTOLIC BLOOD PRESSURE: 120 MMHG

## 2017-09-07 VITALS
BODY MASS INDEX: 17.83 KG/M2 | HEART RATE: 64 BPM | WEIGHT: 131.63 LBS | DIASTOLIC BLOOD PRESSURE: 73 MMHG | HEIGHT: 72 IN | SYSTOLIC BLOOD PRESSURE: 120 MMHG | TEMPERATURE: 99 F

## 2017-09-07 DIAGNOSIS — G89.4 CHRONIC PAIN SYNDROME: Primary | ICD-10-CM

## 2017-09-07 DIAGNOSIS — Z79.891 ENCOUNTER FOR LONG-TERM OPIATE ANALGESIC USE: ICD-10-CM

## 2017-09-07 DIAGNOSIS — F11.20 OPIOID TYPE DEPENDENCE, CONTINUOUS USE: Primary | ICD-10-CM

## 2017-09-07 DIAGNOSIS — F11.20 OPIOID TYPE DEPENDENCE, CONTINUOUS USE: ICD-10-CM

## 2017-09-07 DIAGNOSIS — M47.812 DJD (DEGENERATIVE JOINT DISEASE), CERVICAL: ICD-10-CM

## 2017-09-07 DIAGNOSIS — M54.2 NECK PAIN: ICD-10-CM

## 2017-09-07 DIAGNOSIS — M47.26 OSTEOARTHRITIS OF SPINE WITH RADICULOPATHY, LUMBAR REGION: ICD-10-CM

## 2017-09-07 DIAGNOSIS — M50.30 DDD (DEGENERATIVE DISC DISEASE), CERVICAL: ICD-10-CM

## 2017-09-07 DIAGNOSIS — M54.12 CERVICAL RADICULOPATHY: ICD-10-CM

## 2017-09-07 PROCEDURE — 99999 PR PBB SHADOW E&M-EST. PATIENT-LVL III: CPT | Mod: PBBFAC,,, | Performed by: NURSE PRACTITIONER

## 2017-09-07 PROCEDURE — 1159F MED LIST DOCD IN RCRD: CPT | Mod: ,,, | Performed by: NURSE PRACTITIONER

## 2017-09-07 PROCEDURE — 99214 OFFICE O/P EST MOD 30 MIN: CPT | Mod: S$PBB,,, | Performed by: NURSE PRACTITIONER

## 2017-09-07 PROCEDURE — 99999 PR PBB SHADOW E&M-EST. PATIENT-LVL III: CPT | Mod: PBBFAC,,, | Performed by: PSYCHIATRY & NEUROLOGY

## 2017-09-07 PROCEDURE — 1125F AMNT PAIN NOTED PAIN PRSNT: CPT | Mod: ,,, | Performed by: NURSE PRACTITIONER

## 2017-09-07 PROCEDURE — 1125F AMNT PAIN NOTED PAIN PRSNT: CPT | Mod: ,,, | Performed by: PSYCHIATRY & NEUROLOGY

## 2017-09-07 PROCEDURE — 1159F MED LIST DOCD IN RCRD: CPT | Mod: ,,, | Performed by: PSYCHIATRY & NEUROLOGY

## 2017-09-07 PROCEDURE — 99214 OFFICE O/P EST MOD 30 MIN: CPT | Mod: S$PBB,,, | Performed by: PSYCHIATRY & NEUROLOGY

## 2017-09-07 PROCEDURE — 99213 OFFICE O/P EST LOW 20 MIN: CPT | Mod: PBBFAC,27 | Performed by: PSYCHIATRY & NEUROLOGY

## 2017-09-07 PROCEDURE — 99213 OFFICE O/P EST LOW 20 MIN: CPT | Mod: PBBFAC | Performed by: NURSE PRACTITIONER

## 2017-09-07 PROCEDURE — 99499 UNLISTED E&M SERVICE: CPT | Mod: S$PBB,,, | Performed by: NURSE PRACTITIONER

## 2017-09-07 RX ORDER — ALPRAZOLAM 2 MG/1
2 TABLET ORAL 2 TIMES DAILY PRN
Qty: 60 TABLET | Refills: 0 | Status: SHIPPED | OUTPATIENT
Start: 2017-09-07 | End: 2017-10-25 | Stop reason: SDUPTHER

## 2017-09-07 RX ORDER — FENTANYL 50 UG/1
1 PATCH TRANSDERMAL
Qty: 10 PATCH | Refills: 0 | Status: SHIPPED | OUTPATIENT
Start: 2017-09-07 | End: 2017-09-27

## 2017-09-07 RX ORDER — ALBUTEROL SULFATE 90 UG/1
2 AEROSOL, METERED RESPIRATORY (INHALATION) EVERY 6 HOURS PRN
Qty: 1 INHALER | Refills: 6 | Status: CANCELLED | OUTPATIENT
Start: 2017-09-07

## 2017-09-07 NOTE — PROGRESS NOTES
Chronic Pain - Follow Up     Referring Physician: Ayden Almonte MD     No chief complaint on file.        SUBJECTIVE: Disclaimer: This note has been generated using voice-recognition software. There may be typographical errors that have been missed during proof-reading    HPI    The patient presents today for follow up and to discuss medication changes.  He has been taking Oxycodone 30 mg TID PRN pain from Dr. Browne.  He is having limited benefit with the medication.  He recently had a barium swallow which showed difficulty swallowing a pill which became lodged in the vallecula.  Because of this, Dr. Browne would like us to consider changing the route of his pain medication.  He was previously a patient of Dr. Mcmillan but is now under the care of Dr. Dietz.  He is scheduled for L4-5 IL VALERI on 9/14/17 with Dr. Dietz.  He is s/p T1-2 cervical VALERI (he is fused at C7-T1) on 8/7/17 with 50% benefit for one week.  He would like to repeat the procedure as he states in the past he had significant benefit with a second procedure.  His pain today is 10/10.  The patient denies any bowel or bladder incontinence or signs of saddle paresthesia.  The patient denies any major medical changes since last office visit.       Pain Description:     The pain is located in the neck upper back area and radiates to the across the chest.      At BEST 5/10      At WORST  7/10 on the WORST day.      On average pain is rated as 4/10.      Today the pain is rated as 5/10     The pain is described as aching, burning, numbing, sharp, shooting, tight band and deep       Symptoms interfere with daily activity, sleeping and work.      Exacerbating factors: Standing, Bending, Touching, Coughing/Sneezing, Eating, Walking, Night Time, Morning, Extension, Flexing and Lifting.      Mitigating factors heat, laying down, medications, rest and medications and injections (although he did not specify which injections he has had in the past).       Patient denies urinary incontinence and bowel incontinence. Patient has been having decreased appetite treated with Megace     Patient denies any suicidal or homicidal ideations     Pain Medications:  Current:  Oxycodone 30mg 3-4 /day  Carloz- Dr. Alfonso     Tried in Past:  NSAIDs - Celebrex and Ibuprofen  TCA - Yes  SNRI - Yes  Anti-convulsants -Gabapentin and Lyrica  Muscle Relaxants - Soma  Opioids- oxycodone     Physical Therapy/Home Exercise: no       report: Reviewed and consistent with medication use as prescribed.     Pain Procedures:   ESIs in the past (1994)  8/7/17 T1-T2 IL VALERI- 50% relief for one week     Chiropractor -never  Acupuncture - never  TENS unit -never  Spinal decompression -Cervical fusion  Joint replacement -never    Imaging:   MRI Cervical Spine 1/19/2017:  Stable anterior and interbody fusion of C5-6. 2 mm anterolisthesis at C4 which were not present on the prior study. Spondylosis at multiple cervical levels which has worsened since the prior study in 2012.     C2-3: A posterior disc protrusion is present causing moderate spinal stenosis, effacement of the anterior thecal sac and flattening of the anterior cervical cord.     C3-4: Posterior disc protrusion which causes moderate spinal stenosis, effacement of the anterior thecal sac, and flattening of the anterior cervical cord.     C4-5: Posterior disc osteophyte complex which has worsened significantly since the prior study and now causes moderate spinal stenosis, effacement of the anterior thecal sac, and flattening of the anterior cervical cord.    C6-7: Posterior disc osteophyte complex is stable when compared to the prior study. Thickening of the ligamentum flavum has worsened. Moderate spinal stenosis with effacement of the anterior thecal sac. Moderate to severe right-sided neural foraminal stenosis with suspected impingement of the exiting nerve root.       MRI Thoracic Spine 1/19/2017:  An exaggerated thoracic kyphosis. Old  superior endplate compression fracture to the T4 vertebral body. Superior endplate Schmorl's nodes are present at T1 and T2. Disc bulge at T3-4 which does not cause significant spinal or neural foraminal stenosis. Thick potentially calcified pleural plaques are present bilaterally along with airspace disease, atelectasis, and pulmonary nodularity. Dedicated imaging of the chest is recommended if this has not already been done.     MRI Lumbar Spine 1/19/2017:  A superior endplate compression fracture is present to the L2 vertebral body which has healed with a residual 30% loss of height. 2 mm retrolisthesis at L2.     L1-2: Annular bulge without spinal or neural foraminal stenosis.     L2-3 Annular bulge with mild spinal stenosis and no neural foraminal stenosis.    L3-4: Annular bulge with mild spinal stenosis and mild bilateral neural foraminal stenosis.     L4-5: Annular bulge and small posterior annular tear seen on image 11 of series 3. An annular defect is present to the anterior right lateral portion of the disc and was not seen on the prior study. Mild spinal stenosis and moderate bilateral neural foraminal stenosis.     L5-S1: Annular bulge with no spinal stenosis and mild left-sided neural foraminal stenosis.          REVIEW OF SYSTEMS:     GENERAL:  Weight loss and decreased appetite  HEENT:  No recent changes in vision or hearing  NECK:  difficulty with swallowing.  RESPIRATORY: Negative for cough, wheezing or shortness of breath, patient denies any recent URI.  CARDIOVASCULAR: Negative for chest pain, leg swelling or palpitations.  GI: Negative for abdominal discomfort, blood in stools or black stools or change in bowel habits.  MUSCULOSKELETAL: See HPI.  SKIN: Negative for lesions, rash, and itching.  PSYCH:  Depression and anxiety treated with citalopram, Wellbutrin, alprazolam.  Patient's sleep is disturbed secondary to pain.  HEMATOLOGY/LYMPHOLOGY: Negative for prolonged bleeding, bruising easily or  swollen nodes.   ENDO: No history of diabetes or thyroid dysfunction  NEURO: No history of headaches, syncope, paralysis, seizures or tremors.  All other reviewed and negative other than HPI.     OBJECTIVE:    /73   Pulse 64   Temp 98.7 °F (37.1 °C)   Ht 6' (1.829 m)   Wt 59.7 kg (131 lb 9.8 oz)   BMI 17.85 kg/m²     PHYSICAL EXAMINATION:    GENERAL: Well appearing, in no acute distress, alert and oriented x3.  PSYCH:  Mood and affect appropriate.  SKIN: Skin color, texture, turgor normal, no rashes or lesions.  HEAD/FACE:  Normocephalic, atraumatic. Cranial nerves grossly intact.  NECK: Pain to palpation over the cervical paraspinous muscles. Spurling positive on right side. Pain with neck flexion, extension, and lateral flexion. Positive facet loading bilaterally, R>L.  CV: RRR with palpation of the radial artery.  PULM: No evidence of respiratory difficulty, symmetric chest rise.  EXTREMITIES: Peripheral joint ROM is full and pain free without obvious instability or laxity in all four extremities. Trigger finger to left middle finger.  Good capillary refill.  MUSCULOSKELETAL:  No atrophy or tone abnormalities are noted.  NEURO: Bilateral upper extremity coordination and muscle stretch reflexes are physiologic and symmetric.  Darren's negative. No clonus.  No loss of sensation is noted.  GAIT: Antalgic, ambulates without assistance      ASSESSMENT: 70 y.o. year old male with pain, consistent with      Encounter Diagnoses   Name Primary?    Chronic pain syndrome Yes    Osteoarthritis of spine with radiculopathy, lumbar region     Cervical radiculopathy     DJD (degenerative joint disease), cervical     DDD (degenerative disc disease), cervical     Opioid type dependence, continuous use     Neck pain     Encounter for long-term opiate analgesic use       PLAN:     - Previous imaging was reviewed and discussed with the patient today.    - He is scheduled for L4-5 IL VALERI on 9/14/17.  Will schedule  repeat T1-2 cervical IL VALERI.  The procedure, risks, benefits and options were discussed with patient. There are no contraindications to the procedure. The patient expressed understanding and agreed to proceed.  Consent obtained today.    - Continue to follow up with Dr. Browne.     - Discontinue oxicodone 30 mg TID. Dr. Dietz will take over his medications at this point.  Due to inability to swallow pills as evidenced on recent swallow study, will start 50 Mcg Fentanyl patch Q72h.  I discussed the risks and benefits of the medication with the patient.  I also discussed usage and proper storage at length.    - UDS from 3/1/17 reviewed and compliant.  UDS next OV.    - Can continue Soma from Dr. Alfonso and Xanax from Dr. Browne.    - RTC 2 weeks after above procedure as previously scheduled.    - The patient will continue a home exercise routine to help with pain and strengthening.      - Dr. Dietz was consulted on the patient and agrees with this plan.      The above plan and management options were discussed at length with patient. Patient is in agreement with the above and verbalized understanding.     Dilia Bond, GEOVANNI  09/07/2017

## 2017-09-07 NOTE — PROGRESS NOTES
"Outpatient Psychiatry Follow-Up Visit (MD/NP)    9/7/2017    Clinical Status of Patient:  Outpatient (Ambulatory)    Chief Complaint:  Griffin Hall is a 72 y.o. male who presents today for follow-up of anxiety and opioid and benzodiazepine dependence.  Met with patient.      Interval History and Content of Current Session:  Interim Events/Subjective Report/Content of Current Session: Pt  Comes to this appointment accompanied by his daughter. She rports that her father has been in a lot of pain. That he is unable to swallow solid food and he had a barium swallow done. Per reports the pill is getting stuck in the valeculla. Pt reports that he has been losing weight and the pain is " making me miserable". daughter reports that she has never had concerns about her father abusing the medications, that he takes them as prescribed.       Psychotherapy:  · Target symptoms: opioid dependence, benzodiazepine dependence  · Why chosen therapy is appropriate versus another modality: relevant to diagnosis, patient responds to this modality  · Outcome monitoring methods: self-report, observation, checklist/rating scale  · Therapeutic intervention type: behavior modifying psychotherapy, supportive psychotherapy  · Topics discussed/themes: stress related to medical comorbidities, difficulty managing affect in interpersonal relationships, building skills sets for symptom management, symptom recognition  · The patient's response to the intervention is accepting. The patient's progress toward treatment goals is fair.   · Duration of intervention: 30 minutes.    Review of Systems   · PSYCHIATRIC: Pertinant items are noted in the narrative.    Past Medical, Family and Social History: The patient's past medical, family and social history have been reviewed and updated as appropriate within the electronic medical record - see encounter notes.    Compliance: yes    Side effects: None    Risk Parameters:  Patient reports no suicidal " "ideation  Patient reports no homicidal ideation  Patient reports no self-injurious behavior  Patient reports no violent behavior    Exam (detailed: at least 9 elements; comprehensive: all 15 elements)   Constitutional  Vitals:  Most recent vital signs, dated less than 90 days prior to this appointment, were reviewed.   Vitals:    09/07/17 0933   BP: 120/73   Pulse: 64   Resp: 17   Temp: 98 °F (36.7 °C)   TempSrc: Oral   Weight: 59.7 kg (131 lb 9.8 oz)   Height: 6' (1.829 m)        General:  age appropriate, casually dressed, neatly groomed     Musculoskeletal  Muscle Strength/Tone:  no tremor, no tic   Gait & Station:  non-ataxic     Psychiatric  Speech:  no latency; no press   Mood & Affect:  "in a lot of pain"  congruent and appropriate   Thought Process:  normal and logical, goal-directed   Associations:  intact   Thought Content:  normal, no suicidality, no homicidality, delusions, or paranoia   Insight:  intact   Judgement: behavior is adequate to circumstances   Orientation:  grossly intact   Memory: intact for content of interview   Language: grossly intact   Attention Span & Concentration:  able to focus   Fund of Knowledge:  intact and appropriate to age and level of education     Assessment and Diagnosis   Status/Progress: Based on the examination today, the patient's problem(s) is/are inadequately controlled.  New problems have been presented today.   Co-morbidities are complicating management of the primary condition.  There are no active rule-out diagnoses for this patient at this time.            Impression: Pt is a 70 Y/O CM with PMHx sig for H/O head and neck cancer, chronic pain of both shoulders, cervical stenosis of spinal cord with           Opioid dependence in a controlled environment.  Benzodiazepine dependence   BETTYE              Strengths and Liabilities: Strength: Patient accepts guidance/feedback, Strength: Patient is expressive/articulate., Strength: Patient is intelligent., Strength: " Patient is motivated for change., Strength: Patient has positive support network., Strength: Patient has reasonable judgment.      Treatment Goals: Specify outcomes written in observable, behavioral terms:   Anxiety: acquiring relapse prevention skills, eliminating all anxiety symptoms (SCL-90-R scores in normal range), reducing negative automatic thoughts, reducing physical symptoms of anxiety and reducing time spent worrying (<30 minutes/day)  Safety: to take medications only as prescribed by his MDs      Treatment Plan/Recommendations:   · Medication Management: Continue current medications. The risks and benefits of medication were discussed with the patient.  · Referral for further treatment to social work team for psychotherapy  · The treatment plan and follow up plan were reviewed with the patient.   · Pt will continue to F/U with Dr Dietz. .have sent him a message about switching the pain medication  · Pt will see Dilia Bond today. .   · Cont the xanax 2 mg upto twice daily, pt reports it also helps as a muscle relaxant.  · Will cont to monitor  · Reviewed LA .  · Discussed chronic pain rehabilitation.  · Discussed coping skills.         Get UDS as needed.       Return to Clinic: 1 month.

## 2017-09-12 NOTE — PATIENT INSTRUCTIONS
IMPRESSIONS:   Patient appears to present with  1. Oral pharyngeal dysphagia characterized by delayed swallow and generalized weakness throughout the swallow resulting in aspiration on thin and nectar thin liquids above 5 ml.  2. History of radical neck dissection 2/2 CA in 2005.  3. As required for Medicare G-codes/severity indicators, the patient's swallowing was rated as follows:      Swallowing  Current status:  FCM:  LEVEL 4: Swallowing is safe, but usually requires moderate cues to use compensatory strategies, and/or the individual has moderate diet restrictions and/or still requires tube feeding and/or oral supplements.   -   Projected status:  FCM:   LEVEL 5: Swallowing is safe with minimal diet restriction and/or occasionally requires minimal cueing to use compensatory strategies. The individual may occasionally self-cue. All nutrition and hydration needs are met by mouth at mealtime.   -   Discharge status:  FCM:   LEVEL 4: Swallowing is safe, but usually requires moderate cues to use compensatory strategies, and/or the individual has moderate diet restrictions and/or still requires tube feeding and/or oral supplements.   -  CK            RECOMMENDATIONS/PLAN OF CARE:   It is felt that Mr. Mina would benefit from     1.Continuing a regular diet consistency with thin liquids in small sips (teaspoon) AND following swallowing precautions listed in full report.      2. Compensatory strategies include: Upright posture for all oral intake and remain fully upright for at least 30 minutes to 1 hour after oral intake unless otherwise indicated by the physician or other healthcare provider. No head tilting upward or backward to eat/drink; if the head needs to be tilted upward/backward to get a sip from a bottle/glass/cup, then put only a small sip in the front of the mouth and lower the head to a level position before swallowing.   Take small sips/bites (1/2 to 1 teaspoon in size or as  "otherwise tolerated or preferred by the patient).   Do not inhale, talk, laugh, or make any sounds with the voice if swallowing something in the back of the mouth/throat.   Swallow each single sip/bite fully all the way through the throat/neck before another is put in the mouth.  Take additional two or three effortful "dry"/saliva swallows to help clear pooling in the throat.    Alternate food bites with small sips of liquid to clear food from the throat.     3. Take medications crushed when possible or with small sips of thin liquid or embedded in applesauce consistency foods.     4. Initiate dysphagia treatment to improve swallow function and safety.     5. Information will be mailed to the patient re: the above findings and recommendations.     6. Review of the swallow study results by the referring physician for further management of the patients concerns.     7. Contact [facility] at [phone] with any further questions or concerns.    "

## 2017-09-14 ENCOUNTER — HOSPITAL ENCOUNTER (OUTPATIENT)
Facility: OTHER | Age: 72
Discharge: HOME | End: 2017-09-14
Attending: ANESTHESIOLOGY | Admitting: ANESTHESIOLOGY
Payer: MEDICARE

## 2017-09-14 ENCOUNTER — SURGERY (OUTPATIENT)
Age: 72
End: 2017-09-14

## 2017-09-14 VITALS
WEIGHT: 140 LBS | TEMPERATURE: 98 F | RESPIRATION RATE: 18 BRPM | DIASTOLIC BLOOD PRESSURE: 57 MMHG | SYSTOLIC BLOOD PRESSURE: 103 MMHG | BODY MASS INDEX: 18.96 KG/M2 | HEART RATE: 58 BPM | HEIGHT: 72 IN | OXYGEN SATURATION: 96 %

## 2017-09-14 DIAGNOSIS — M47.22 OSTEOARTHRITIS OF SPINE WITH RADICULOPATHY, CERVICAL REGION: Primary | ICD-10-CM

## 2017-09-14 DIAGNOSIS — M47.27 OSTEOARTHRITIS OF LUMBOSACRAL SPINE WITH RADICULOPATHY: ICD-10-CM

## 2017-09-14 PROCEDURE — 25500020 PHARM REV CODE 255: Performed by: ANESTHESIOLOGY

## 2017-09-14 PROCEDURE — 63600175 PHARM REV CODE 636 W HCPCS: Performed by: ANESTHESIOLOGY

## 2017-09-14 PROCEDURE — 25000003 PHARM REV CODE 250: Performed by: ANESTHESIOLOGY

## 2017-09-14 PROCEDURE — 62322 NJX INTERLAMINAR LMBR/SAC: CPT | Performed by: ANESTHESIOLOGY

## 2017-09-14 PROCEDURE — 62323 NJX INTERLAMINAR LMBR/SAC: CPT | Mod: ,,, | Performed by: ANESTHESIOLOGY

## 2017-09-14 PROCEDURE — 62323 NJX INTERLAMINAR LMBR/SAC: CPT | Performed by: ANESTHESIOLOGY

## 2017-09-14 RX ORDER — ALPRAZOLAM 0.5 MG/1
1 TABLET, ORALLY DISINTEGRATING ORAL ONCE
Status: COMPLETED | OUTPATIENT
Start: 2017-09-14 | End: 2017-09-14

## 2017-09-14 RX ORDER — LIDOCAINE HYDROCHLORIDE 10 MG/ML
INJECTION, SOLUTION EPIDURAL; INFILTRATION; INTRACAUDAL; PERINEURAL
Status: DISCONTINUED | OUTPATIENT
Start: 2017-09-14 | End: 2017-09-14 | Stop reason: HOSPADM

## 2017-09-14 RX ORDER — DEXAMETHASONE SODIUM PHOSPHATE 100 MG/10ML
INJECTION INTRAMUSCULAR; INTRAVENOUS
Status: DISCONTINUED | OUTPATIENT
Start: 2017-09-14 | End: 2017-09-14 | Stop reason: HOSPADM

## 2017-09-14 RX ORDER — LIDOCAINE HYDROCHLORIDE 10 MG/ML
INJECTION INFILTRATION; PERINEURAL
Status: DISCONTINUED | OUTPATIENT
Start: 2017-09-14 | End: 2017-09-14 | Stop reason: HOSPADM

## 2017-09-14 RX ADMIN — ALPRAZOLAM 1 MG: 0.5 TABLET, ORALLY DISINTEGRATING ORAL at 02:09

## 2017-09-14 RX ADMIN — IOHEXOL 10 ML: 300 INJECTION, SOLUTION INTRAVENOUS at 02:09

## 2017-09-14 RX ADMIN — LIDOCAINE HYDROCHLORIDE 10 ML: 10 INJECTION, SOLUTION EPIDURAL; INFILTRATION; INTRACAUDAL; PERINEURAL at 02:09

## 2017-09-14 RX ADMIN — DEXAMETHASONE SODIUM PHOSPHATE 10 MG: 10 INJECTION INTRAMUSCULAR; INTRAVENOUS at 02:09

## 2017-09-14 RX ADMIN — LIDOCAINE HYDROCHLORIDE 10 ML: 10 INJECTION, SOLUTION INFILTRATION; PERINEURAL at 02:09

## 2017-09-14 NOTE — OP NOTE
Lumbar Interlaminar Epidural Steroid Injection under Fluoroscopic Guidance.  Time-out taken to identify patient and procedure side prior to starting the procedure.   I attest that I have reviewed the patient's home medications prior to the procedure and no contraindication have been identified. I  re-evaluated the patient after the patient was positioned for the procedure in the procedure room immediately before the procedural time-out. The vital signs are current and represent the current state of the patient which has not significantly changed since the preprocedure assessment.                                                               Date of Service: 09/14/2017    PCP: Luz Claudio MD    Referring Physician:    Procedure:  L4-5 Interlaminar epidural steroid injection under fluoroscopic guidance.    Reasons for procedure: Lumbar radiculopathy due to osteoarthritis of the lumbar spine    Physician: Noel Dietz MD  ASSISTANTS: Williams Marin MD    Medications injected: Preservative-free dexamethasone 10mg with 4mL of sterile Xylocaine-MPF 1% and 1ml of sterile preservative-free normal saline.    Local anesthetic injected:    Xylocaine 1% 9ml with Sodium Bicarbonate 1ml.   Sedation Medications: None    Estimated blood loss:  none.    Complications:  none.    Technique:  With the patient laying in a prone position, the area was prepped and draped in the usual sterile fashion using ChloraPrep and a fenestrated drape.  Local anesthetic was given using a 27-gauge needle by raising a wheal and going down to the hub of the needle.  A 3.5 inch 20-gauge Touhy needle was introduced under fluoroscopic guidance.  It met the lamina of the posterior element. The needle was then hinged above the lamina.  Loss of resistance technique was employed while advancing the needle.  Once in the desired position, contrast dye Omnipaque was injected to confirm placement and there was no vascular runoff.  Digital subtraction was  employed to confirm that there was no vascular runoff.  The medication was then injected slowly.  The patient tolerated the procedure well.      Pain before the procedure: 9/10    Pain after the procedure: 0/10    The patient was monitored after the procedure.   They were given post-procedure and discharge instructions to follow at home.  The patient was discharged in a stable condition.

## 2017-09-14 NOTE — DISCHARGE INSTRUCTIONS

## 2017-09-21 ENCOUNTER — TELEPHONE (OUTPATIENT)
Dept: SPEECH THERAPY | Facility: HOSPITAL | Age: 72
End: 2017-09-21

## 2017-09-21 DIAGNOSIS — R13.12 OROPHARYNGEAL DYSPHAGIA: ICD-10-CM

## 2017-09-21 DIAGNOSIS — Z85.89 HISTORY OF CANCER OF HEAD OR NECK: Primary | ICD-10-CM

## 2017-09-26 ENCOUNTER — TELEPHONE (OUTPATIENT)
Dept: OTOLARYNGOLOGY | Facility: CLINIC | Age: 72
End: 2017-09-26

## 2017-09-27 ENCOUNTER — OFFICE VISIT (OUTPATIENT)
Dept: PAIN MEDICINE | Facility: CLINIC | Age: 72
End: 2017-09-27
Payer: MEDICARE

## 2017-09-27 VITALS
HEIGHT: 72 IN | HEART RATE: 74 BPM | RESPIRATION RATE: 20 BRPM | WEIGHT: 140 LBS | DIASTOLIC BLOOD PRESSURE: 61 MMHG | BODY MASS INDEX: 18.96 KG/M2 | SYSTOLIC BLOOD PRESSURE: 109 MMHG | TEMPERATURE: 98 F

## 2017-09-27 DIAGNOSIS — G89.4 CHRONIC PAIN SYNDROME: Primary | ICD-10-CM

## 2017-09-27 DIAGNOSIS — M54.17 LUMBOSACRAL RADICULOPATHY: ICD-10-CM

## 2017-09-27 DIAGNOSIS — Z79.891 ENCOUNTER FOR LONG-TERM OPIATE ANALGESIC USE: ICD-10-CM

## 2017-09-27 DIAGNOSIS — M54.12 CERVICAL RADICULOPATHY: ICD-10-CM

## 2017-09-27 DIAGNOSIS — M51.36 DDD (DEGENERATIVE DISC DISEASE), LUMBAR: ICD-10-CM

## 2017-09-27 DIAGNOSIS — R13.10 DYSPHAGIA, UNSPECIFIED: ICD-10-CM

## 2017-09-27 DIAGNOSIS — M48.02 CERVICAL STENOSIS OF SPINAL CANAL: ICD-10-CM

## 2017-09-27 DIAGNOSIS — Z85.89 HISTORY OF HEAD AND NECK CANCER: ICD-10-CM

## 2017-09-27 DIAGNOSIS — M47.26 OSTEOARTHRITIS OF SPINE WITH RADICULOPATHY, LUMBAR REGION: ICD-10-CM

## 2017-09-27 DIAGNOSIS — M47.812 DJD (DEGENERATIVE JOINT DISEASE), CERVICAL: ICD-10-CM

## 2017-09-27 DIAGNOSIS — S32.020D CLOSED COMPRESSION FRACTURE OF L2 LUMBAR VERTEBRA, WITH ROUTINE HEALING, SUBSEQUENT ENCOUNTER: ICD-10-CM

## 2017-09-27 PROCEDURE — 99999 PR PBB SHADOW E&M-EST. PATIENT-LVL IV: CPT | Mod: PBBFAC,,, | Performed by: NURSE PRACTITIONER

## 2017-09-27 PROCEDURE — 1125F AMNT PAIN NOTED PAIN PRSNT: CPT | Mod: ,,, | Performed by: NURSE PRACTITIONER

## 2017-09-27 PROCEDURE — 1159F MED LIST DOCD IN RCRD: CPT | Mod: ,,, | Performed by: NURSE PRACTITIONER

## 2017-09-27 PROCEDURE — 99214 OFFICE O/P EST MOD 30 MIN: CPT | Mod: PBBFAC | Performed by: NURSE PRACTITIONER

## 2017-09-27 PROCEDURE — 99213 OFFICE O/P EST LOW 20 MIN: CPT | Mod: S$PBB,,, | Performed by: NURSE PRACTITIONER

## 2017-09-27 PROCEDURE — 1157F ADVNC CARE PLAN IN RCRD: CPT | Mod: ,,, | Performed by: NURSE PRACTITIONER

## 2017-09-27 PROCEDURE — 99499 UNLISTED E&M SERVICE: CPT | Mod: S$PBB,,, | Performed by: NURSE PRACTITIONER

## 2017-09-27 NOTE — PROGRESS NOTES
Chronic Pain - Follow Up     Referring Physician: Ayden Almonte MD     Chief Complaint   Patient presents with    Back Pain         SUBJECTIVE: Disclaimer: This note has been generated using voice-recognition software. There may be typographical errors that have been missed during proof-reading    HPI    The patient presents today for follow up. He is s/p L4-5 IL VALERI on 9/14/2017. He reports no relief of his low back and leg pain, not even the day of the procedure. He continues to report low back pain that radiates down the back of his left leg to his foot. He also reports radiating pain down the front of his right leg to his knee, this is a new pain. He continues to report neck pain that radiates into his right arm and hand. He is scheduled for repeat T1-2 IL VALERI next week. He reports that the Fentanyl patches is not helpful for his pain. He reports that the patch comes off easy. He recently had a barium swallow which showed difficulty swallowing a pill which became lodged in the vallecula. 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 He had previous benefit with Oxycodone prior to swallow study. He is frustrated by his continued pain. He reports that he would like to be evaluated by neurosurgery to discuss any surgical options. He denies any other health changes. He denies any bowel or bladder incontinence. His pain today is 10/10.         Pain Description:     The pain is located in the neck upper back area and radiates to the across the chest.      At BEST 5/10      At WORST  7/10 on the WORST day.      On average pain is rated as 4/10.      Today the pain is rated as 5/10     The pain is described as aching, burning, numbing, sharp, shooting, tight band and deep       Symptoms interfere with daily activity, sleeping and work.      Exacerbating factors: Standing, Bending, Touching, Coughing/Sneezing, Eating, Walking, Night Time, Morning, Extension, Flexing and  Lifting.      Mitigating factors heat, laying down, medications, rest and medications and injections (although he did not specify which injections he has had in the past).      Patient denies urinary incontinence and bowel incontinence. Patient has been having decreased appetite treated with Megace     Patient denies any suicidal or homicidal ideations     Pain Medications:  Current:  Oxycodone 30mg 3-4 /day  Carloz- Dr. Alfonso     Tried in Past:  NSAIDs - Celebrex and Ibuprofen  TCA - Yes  SNRI - Yes  Anti-convulsants -Gabapentin and Lyrica  Muscle Relaxants - Soma  Opioids- oxycodone     Physical Therapy/Home Exercise: no       report: Reviewed and consistent with medication use as prescribed.     Pain Procedures:   ESIs in the past (1994)  8/7/17 T1-T2 IL VALERI- 50% relief for one week  9/14/2017- L4-5 IL VALERI- no relief     Chiropractor -never  Acupuncture - never  TENS unit -never  Spinal decompression -Cervical fusion  Joint replacement -never    Imaging:   MRI Cervical Spine 1/19/2017:  Stable anterior and interbody fusion of C5-6. 2 mm anterolisthesis at C4 which were not present on the prior study. Spondylosis at multiple cervical levels which has worsened since the prior study in 2012.     C2-3: A posterior disc protrusion is present causing moderate spinal stenosis, effacement of the anterior thecal sac and flattening of the anterior cervical cord.     C3-4: Posterior disc protrusion which causes moderate spinal stenosis, effacement of the anterior thecal sac, and flattening of the anterior cervical cord.     C4-5: Posterior disc osteophyte complex which has worsened significantly since the prior study and now causes moderate spinal stenosis, effacement of the anterior thecal sac, and flattening of the anterior cervical cord.    C6-7: Posterior disc osteophyte complex is stable when compared to the prior study. Thickening of the ligamentum flavum has worsened. Moderate spinal stenosis with effacement of the  anterior thecal sac. Moderate to severe right-sided neural foraminal stenosis with suspected impingement of the exiting nerve root.       MRI Thoracic Spine 1/19/2017:  An exaggerated thoracic kyphosis. Old superior endplate compression fracture to the T4 vertebral body. Superior endplate Schmorl's nodes are present at T1 and T2. Disc bulge at T3-4 which does not cause significant spinal or neural foraminal stenosis. Thick potentially calcified pleural plaques are present bilaterally along with airspace disease, atelectasis, and pulmonary nodularity. Dedicated imaging of the chest is recommended if this has not already been done.     MRI Lumbar Spine 1/19/2017:  A superior endplate compression fracture is present to the L2 vertebral body which has healed with a residual 30% loss of height. 2 mm retrolisthesis at L2.     L1-2: Annular bulge without spinal or neural foraminal stenosis.     L2-3 Annular bulge with mild spinal stenosis and no neural foraminal stenosis.    L3-4: Annular bulge with mild spinal stenosis and mild bilateral neural foraminal stenosis.     L4-5: Annular bulge and small posterior annular tear seen on image 11 of series 3. An annular defect is present to the anterior right lateral portion of the disc and was not seen on the prior study. Mild spinal stenosis and moderate bilateral neural foraminal stenosis.     L5-S1: Annular bulge with no spinal stenosis and mild left-sided neural foraminal stenosis.          REVIEW OF SYSTEMS:     GENERAL:  Weight loss and decreased appetite  HEENT:  No recent changes in vision or hearing  NECK:  difficulty with swallowing.  RESPIRATORY: Negative for cough, wheezing or shortness of breath, patient denies any recent URI.  CARDIOVASCULAR: Negative for chest pain, leg swelling or palpitations.  GI: Negative for abdominal discomfort, blood in stools or black stools or change in bowel habits.  MUSCULOSKELETAL: See HPI.  SKIN: Negative for lesions, rash, and  itching.  PSYCH:  Depression and anxiety treated with citalopram, Wellbutrin, alprazolam.  Patient's sleep is disturbed secondary to pain.  HEMATOLOGY/LYMPHOLOGY: Negative for prolonged bleeding, bruising easily or swollen nodes.   ENDO: No history of diabetes or thyroid dysfunction  NEURO: No history of headaches, syncope, paralysis, seizures or tremors.  All other reviewed and negative other than HPI.     OBJECTIVE:    /61 (BP Location: Right arm, Patient Position: Sitting)   Pulse 74   Temp 98.1 °F (36.7 °C) (Oral)   Resp 20   Ht 6' (1.829 m)   Wt 63.5 kg (139 lb 15.9 oz)   BMI 18.99 kg/m²     PHYSICAL EXAMINATION:    GENERAL: Well appearing, in no acute distress, alert and oriented x3.  PSYCH:  Mood and affect appropriate.  SKIN: Skin color, texture, turgor normal, no rashes or lesions.  HEAD/FACE:  Normocephalic, atraumatic. Cranial nerves grossly intact.  NECK: Pain to palpation over the cervical paraspinous muscles. Spurling positive on right side. Pain with neck flexion, extension, and lateral flexion. Positive facet loading bilaterally, R>L.  CV: RRR with palpation of the radial artery.  PULM: No evidence of respiratory difficulty, symmetric chest rise.  BACK: Straight leg raise in the supine position is positive for radicular pain bilaterally. There is pain with palpation over lumbar spine. Limited ROM with pain on flexion and extension. Positive facet loading bilaterally.   EXTREMITIES: Peripheral joint ROM is full and pain free without obvious instability or laxity in all four extremities. Trigger finger to left middle finger.  Good capillary refill.  MUSCULOSKELETAL:  No atrophy or tone abnormalities are noted.  NEURO: Bilateral upper extremity coordination and muscle stretch reflexes are physiologic and symmetric.  Darren's negative. No clonus.  No loss of sensation is noted.  GAIT: Antalgic, ambulates without assistance      ASSESSMENT: 70 y.o. year old male with pain, consistent with       Encounter Diagnoses   Name Primary?    Chronic pain syndrome Yes    Osteoarthritis of spine with radiculopathy, lumbar region     Lumbosacral radiculopathy     DDD (degenerative disc disease), lumbar     Closed compression fracture of L2 lumbar vertebra, with routine healing, subsequent encounter     Cervical radiculopathy     DJD (degenerative joint disease), cervical     Cervical stenosis of spinal canal     Encounter for long-term opiate analgesic use     Dysphagia, unspecified     History of head and neck cancer       PLAN:     - Previous imaging was reviewed and discussed with the patient today.    - Obtain cervical and lumbar MRI.     - He is scheduled for repeat T1-2 cervical IL VALERI.     - Referral to neurosurgery per patient request.     - Discontinue Fentanyl.     - Xtampza 36 mg every 12 hours, #60, 0 refills. He may open the capsules and sprinkle the powder into pudding secondary to impaired swallowing.    - UDS from 3/1/17 reviewed and compliant.  UDS next OV.    - Can continue Soma from Dr. Alfonso and Xanax from Dr. Browne.    - Continue to follow up with Dr. Browne.     - RTC 2 weeks after above procedure as previously scheduled.    - The patient will continue a home exercise routine to help with pain and strengthening.      - Dr. Dietz was consulted on the patient and agrees with this plan.      The above plan and management options were discussed at length with patient. Patient is in agreement with the above and verbalized understanding.     Janis Loera NP  09/27/2017

## 2017-09-28 ENCOUNTER — OFFICE VISIT (OUTPATIENT)
Dept: OTOLARYNGOLOGY | Facility: CLINIC | Age: 72
End: 2017-09-28
Payer: MEDICARE

## 2017-09-28 DIAGNOSIS — Z85.89 HISTORY OF HEAD AND NECK CANCER: ICD-10-CM

## 2017-09-28 DIAGNOSIS — Z92.3 HISTORY OF RADIATION TO HEAD AND NECK REGION: ICD-10-CM

## 2017-09-28 DIAGNOSIS — R13.12 OROPHARYNGEAL DYSPHAGIA: Primary | ICD-10-CM

## 2017-09-28 DIAGNOSIS — F17.200 CURRENT SMOKER: ICD-10-CM

## 2017-09-28 PROCEDURE — 99213 OFFICE O/P EST LOW 20 MIN: CPT | Mod: S$GLB,,, | Performed by: OTOLARYNGOLOGY

## 2017-09-28 RX ORDER — FENTANYL 50 UG/1
1 PATCH TRANSDERMAL
COMMUNITY
End: 2017-10-24 | Stop reason: ALTCHOICE

## 2017-09-28 NOTE — PATIENT INSTRUCTIONS
Pt to follow through with dysphagia therapy as previously ordered and ordered again today.    Then return here in one month.

## 2017-10-02 ENCOUNTER — SURGERY (OUTPATIENT)
Age: 72
End: 2017-10-02

## 2017-10-02 ENCOUNTER — HOSPITAL ENCOUNTER (OUTPATIENT)
Facility: OTHER | Age: 72
Discharge: HOME | End: 2017-10-02
Attending: ANESTHESIOLOGY | Admitting: ANESTHESIOLOGY
Payer: MEDICARE

## 2017-10-02 ENCOUNTER — TELEPHONE (OUTPATIENT)
Dept: SPEECH THERAPY | Facility: HOSPITAL | Age: 72
End: 2017-10-02

## 2017-10-02 VITALS
HEART RATE: 65 BPM | HEIGHT: 71 IN | DIASTOLIC BLOOD PRESSURE: 66 MMHG | TEMPERATURE: 98 F | WEIGHT: 140 LBS | SYSTOLIC BLOOD PRESSURE: 106 MMHG | RESPIRATION RATE: 18 BRPM | BODY MASS INDEX: 19.6 KG/M2 | OXYGEN SATURATION: 97 %

## 2017-10-02 DIAGNOSIS — M51.34 DDD (DEGENERATIVE DISC DISEASE), THORACIC: Primary | ICD-10-CM

## 2017-10-02 DIAGNOSIS — G89.29 CHRONIC PAIN: ICD-10-CM

## 2017-10-02 PROCEDURE — 25500020 PHARM REV CODE 255: Performed by: ANESTHESIOLOGY

## 2017-10-02 PROCEDURE — 25000003 PHARM REV CODE 250: Performed by: ANESTHESIOLOGY

## 2017-10-02 PROCEDURE — 62321 NJX INTERLAMINAR CRV/THRC: CPT | Performed by: ANESTHESIOLOGY

## 2017-10-02 PROCEDURE — 62320 NJX INTERLAMINAR CRV/THRC: CPT | Performed by: ANESTHESIOLOGY

## 2017-10-02 PROCEDURE — 63600175 PHARM REV CODE 636 W HCPCS: Performed by: ANESTHESIOLOGY

## 2017-10-02 PROCEDURE — 62321 NJX INTERLAMINAR CRV/THRC: CPT | Mod: ,,, | Performed by: ANESTHESIOLOGY

## 2017-10-02 RX ORDER — DEXAMETHASONE SODIUM PHOSPHATE 100 MG/10ML
INJECTION INTRAMUSCULAR; INTRAVENOUS
Status: DISCONTINUED | OUTPATIENT
Start: 2017-10-02 | End: 2017-10-02 | Stop reason: HOSPADM

## 2017-10-02 RX ORDER — SODIUM CHLORIDE 9 MG/ML
500 INJECTION, SOLUTION INTRAVENOUS CONTINUOUS
Status: DISCONTINUED | OUTPATIENT
Start: 2017-10-02 | End: 2017-10-02 | Stop reason: HOSPADM

## 2017-10-02 RX ORDER — LIDOCAINE HYDROCHLORIDE 10 MG/ML
INJECTION INFILTRATION; PERINEURAL
Status: DISCONTINUED | OUTPATIENT
Start: 2017-10-02 | End: 2017-10-02 | Stop reason: HOSPADM

## 2017-10-02 RX ORDER — LIDOCAINE HYDROCHLORIDE 10 MG/ML
INJECTION, SOLUTION EPIDURAL; INFILTRATION; INTRACAUDAL; PERINEURAL
Status: DISCONTINUED | OUTPATIENT
Start: 2017-10-02 | End: 2017-10-02 | Stop reason: HOSPADM

## 2017-10-02 RX ADMIN — IOHEXOL 5 ML: 300 INJECTION, SOLUTION INTRAVENOUS at 02:10

## 2017-10-02 RX ADMIN — LIDOCAINE HYDROCHLORIDE 10 ML: 10 INJECTION, SOLUTION INFILTRATION; PERINEURAL at 02:10

## 2017-10-02 RX ADMIN — DEXAMETHASONE SODIUM PHOSPHATE 10 MG: 10 INJECTION INTRAMUSCULAR; INTRAVENOUS at 02:10

## 2017-10-02 RX ADMIN — LIDOCAINE HYDROCHLORIDE 5 ML: 10 INJECTION, SOLUTION EPIDURAL; INFILTRATION; INTRACAUDAL; PERINEURAL at 02:10

## 2017-10-02 NOTE — DISCHARGE INSTRUCTIONS
Home Care Instructions Pain Management:    1. DIET:   You may resume your normal diet today.   2. BATHING:   You may shower with luke warm water. No tub baths or anything that will soak injection sites under water for 24 hours.  3. DRESSING:   You may remove your bandage today.   4. ACTIVITY LEVEL:   You may resume your normal activities 24 hrs after your procedure. Nothing strenuous today.  5. MEDICATIONS:   You may resume your normal medications today. To restart blood thinners, ask your doctor.  6. SPECIAL INSTRUCTIONS:   No heat to the injection site for 24 hrs including, bath or shower, heating pad, moist heat, or hot tubs.    Use ice pack to injection site for any pain or discomfort.  Apply ice packs for 20 minute intervals as needed.     If you have received any sedatives by mouth today you may not drive for 12 hours.    If you have received any sedation through your IV, you may not drive for 24 hrs.     PLEASE CALL YOUR DOCTOR IF:  1. Redness or swelling around the injection site.  2. Fever of 101 degrees or more  3. Drainage (pus) from the injection site.  4. For any continuous bleeding (some dried blood over the incision is normal.)    FOR EMERGENCIES:   If any unusual problems or difficulties occur during clinic hours, call [phone] or 134.

## 2017-10-02 NOTE — OP NOTE
Cervical (T1-T2) Interlaminar Epidural Steroid Injection under fluoroscopic guidance.  Time-out taken to identify patient and procedure side prior to starting the procedure.   I attest that I have reviewed the patient's home medications prior to the procedure and no contraindication have been identified. I  re-evaluated the patient after the patient was positioned for the procedure in the procedure room immediately before the procedural time-out. The vital signs are current and represent the current state of the patient which has not significantly changed since the preprocedure assessment.                                                              Date of Service: 10/02/2017    PCP: Luz Claudio MD    Referring Physician: MD Shanon    Procedure: T1-T2 thoracic interlaminar epidural steroid injection under fluoroscopy.  Reasons for procedure: Cervical radiculopathy [M54.12]  Physician: Noel Dietz MD  ASSISTANTS: Armani Suarez MD, Pain Fellow      Medications injected:  Preservative-free dexamethasone 10mg and Xylocaine 1% MPF 1ml.  This was followed by a slow injection of 4 mL sterile, preservative-free normal saline.    Local anesthetic used: Xylocaine 1% 9ml with Sodium Bicarbonate 1ml.     Sedation Medications: None    Complications:  none.    Estimated blood loss: none.    Technique:  With the patient laying in a prone position with the neck in a flexed forward position, the area was prepped and draped in the usual sterile fashion using ChloraPrep and a fenestrated drape.  The area was determined under fluoroscopic guidance.  Local anesthetic was given using a 27-gauge needle by raising a wheal and going down to the osseous elements of the cervical spine.  A 3.5 inch 20-gauge Touhy needle was introduced under fluoroscopic guidance to meet the lamina of T2.  The needle was then hinged under the lamina then advanced using loss of resistance technique.  Once the tip of the needle was in the desired position,  the contrast dye Omnipaque was injected to determine placement and no vascular runoff.  Digital subtraction was employed to confirm that there is no vascular runoff.  The steroid was then injected slowly followed by a slow injection of the 4 mL of the sterile preservative-free normal saline.  The patient tolerated the procedure well.    Pain before the procedure: 9/10    Pain after the procedure: 0/10    The patient was monitored after the procedure and was given post-procedure and discharge instructions to follow at home. The patient was discharged in a stable condition

## 2017-10-19 ENCOUNTER — HOSPITAL ENCOUNTER (OUTPATIENT)
Dept: RADIOLOGY | Facility: OTHER | Age: 72
Discharge: HOME | End: 2017-10-19
Attending: NURSE PRACTITIONER
Payer: MEDICARE

## 2017-10-19 DIAGNOSIS — S32.020D CLOSED COMPRESSION FRACTURE OF L2 LUMBAR VERTEBRA, WITH ROUTINE HEALING, SUBSEQUENT ENCOUNTER: ICD-10-CM

## 2017-10-19 DIAGNOSIS — G89.4 CHRONIC PAIN SYNDROME: ICD-10-CM

## 2017-10-19 DIAGNOSIS — M51.36 DDD (DEGENERATIVE DISC DISEASE), LUMBAR: ICD-10-CM

## 2017-10-19 DIAGNOSIS — M47.26 OSTEOARTHRITIS OF SPINE WITH RADICULOPATHY, LUMBAR REGION: ICD-10-CM

## 2017-10-19 DIAGNOSIS — M47.812 DJD (DEGENERATIVE JOINT DISEASE), CERVICAL: ICD-10-CM

## 2017-10-19 DIAGNOSIS — M54.17 LUMBOSACRAL RADICULOPATHY: ICD-10-CM

## 2017-10-19 DIAGNOSIS — M48.02 CERVICAL STENOSIS OF SPINAL CANAL: ICD-10-CM

## 2017-10-19 DIAGNOSIS — M54.12 CERVICAL RADICULOPATHY: ICD-10-CM

## 2017-10-19 PROCEDURE — 72148 MRI LUMBAR SPINE W/O DYE: CPT | Mod: TC

## 2017-10-19 PROCEDURE — 72141 MRI NECK SPINE W/O DYE: CPT | Mod: TC

## 2017-10-19 PROCEDURE — 72141 MRI NECK SPINE W/O DYE: CPT | Mod: 26,,, | Performed by: RADIOLOGY

## 2017-10-19 PROCEDURE — 72148 MRI LUMBAR SPINE W/O DYE: CPT | Mod: 26,,, | Performed by: RADIOLOGY

## 2017-10-20 ENCOUNTER — TELEPHONE (OUTPATIENT)
Dept: PAIN MEDICINE | Facility: CLINIC | Age: 72
End: 2017-10-20

## 2017-10-20 NOTE — TELEPHONE ENCOUNTER
Spoke with patient about his MRI results. He would like to schedule appointment with neurosurgery.

## 2017-10-22 VITALS
HEIGHT: 71 IN | SYSTOLIC BLOOD PRESSURE: 125 MMHG | BODY MASS INDEX: 19.14 KG/M2 | WEIGHT: 136.69 LBS | DIASTOLIC BLOOD PRESSURE: 72 MMHG | HEART RATE: 70 BPM

## 2017-10-23 NOTE — PROGRESS NOTES
Subjective:       Patient ID: Griffin Hall is a 72 y.o. male.    Chief Complaint: Dysphagia    Mr. Hall is here today with his daughter.  He did follow through with his MBS this time and is here to review results and recommendations.  There are no new complaints and he continues with difficulty swallowing all consistencies including saliva.  He eats slowly and frequently coughs during meals and there is occasional nasal regurgitation as well.  These symptoms have been going on for several years since his treatment for head and neck cancer with surgery and radiation as previously noted.  He states symptoms have gradually worsened and probably worse in the last 6 months or so.  Nevertheless he states his weight has been stable though low and there has been no pneumonia.  Due to prior noncompliance he was encouraged to follow up today to review the study and discuss all of this in more detail face-to-face.              Review of Systems   Ears: Positive for ringing in ear and dizziness.  Negative for hearing loss, ear pain, ear pressure, ear discharge, ear infections, head trauma, taken gentramycin/streptomycin and family history of hearing loss.    Nose:  Positive for nasal or sinus surgery, loss of smell and postnasal drip. Negative for nasal obstruction.    Mouth/Throat: Positive for pain swallowing and impaired swallowing. Negative for throat mass, neck mass and neck lumps.   Constitutional: Negative for recent unexplained weight loss and fever.    Eyes:  Negative for visual change.   Cardiovascular:  Negative for chest pain and palpitations.   Respiratory:  Negative for asthma, emphysema, history of tuberculosis, recent cough and shortness of breath.    Gastrointestinal:  Negative for history of stomach ulcers or pain, acid reflux, indigestion and blood in stool.   Other:  Positive for arthritis, weakness, depression and anxiety. Negative for kidney problem, bladder problem, prostate disease, disturbances in  coordination, slurred, confusion, heat intolerance, cold intolerance, swollen glands, anemia and persistent infections.           Objective:      Physical Exam   Constitutional: He is oriented to person, place, and time. No distress.   Pt is extremely thin and appears chronically ill and debilitated as he has been for several years.     HENT:   Head: Normocephalic and atraumatic.   Right Ear: Tympanic membrane, external ear and ear canal normal.   Left Ear: Tympanic membrane, external ear and ear canal normal.   Nose: No mucosal edema, rhinorrhea or nasal deformity. No epistaxis.   Mouth/Throat: Uvula is midline. Mucous membranes are dry. No oral lesions. No oropharyngeal exudate, posterior oropharyngeal edema or posterior oropharyngeal erythema.   Post XRT change as before with thickened secretions and no mucosal lesions.     Eyes: Conjunctivae and EOM are normal. Right eye exhibits no discharge. Left eye exhibits no discharge.   Neck: Phonation normal. No tracheal deviation present. No thyromegaly present.   Examination of the neck demonstrates significant post XRT fibrosis as before and no masses.     Pulmonary/Chest: Effort normal. No stridor. No respiratory distress.   Lymphadenopathy:     He has no cervical adenopathy.   Neurological: He is alert and oriented to person, place, and time. He displays weakness.   Skin: Skin is warm and dry. He is not diaphoretic.   Psychiatric: His behavior is normal. His speech is not slurred.              Assessment:       1. Oropharyngeal dysphagia    2. History of head and neck cancer    3. History of radiation to head and neck region    4. Current smoker        Plan:       Reviewed MBS results with pt and daughter in detail and recommendations and answered questions.    Discussed dysphagia therapy and orders placed.  Return in one month unless problems prior.

## 2017-10-24 ENCOUNTER — OFFICE VISIT (OUTPATIENT)
Dept: PAIN MEDICINE | Facility: CLINIC | Age: 72
End: 2017-10-24
Payer: MEDICARE

## 2017-10-24 VITALS
BODY MASS INDEX: 20.27 KG/M2 | TEMPERATURE: 99 F | HEART RATE: 69 BPM | DIASTOLIC BLOOD PRESSURE: 62 MMHG | WEIGHT: 144.81 LBS | HEIGHT: 71 IN | SYSTOLIC BLOOD PRESSURE: 99 MMHG

## 2017-10-24 DIAGNOSIS — M54.12 CERVICAL RADICULOPATHY: ICD-10-CM

## 2017-10-24 DIAGNOSIS — G89.4 CHRONIC PAIN SYNDROME: ICD-10-CM

## 2017-10-24 DIAGNOSIS — M54.17 LUMBOSACRAL RADICULOPATHY: ICD-10-CM

## 2017-10-24 DIAGNOSIS — M47.26 OSTEOARTHRITIS OF SPINE WITH RADICULOPATHY, LUMBAR REGION: ICD-10-CM

## 2017-10-24 DIAGNOSIS — M51.36 DDD (DEGENERATIVE DISC DISEASE), LUMBAR: ICD-10-CM

## 2017-10-24 DIAGNOSIS — Z79.891 ENCOUNTER FOR LONG-TERM OPIATE ANALGESIC USE: ICD-10-CM

## 2017-10-24 DIAGNOSIS — M48.02 CERVICAL STENOSIS OF SPINAL CANAL: ICD-10-CM

## 2017-10-24 DIAGNOSIS — S32.020D CLOSED COMPRESSION FRACTURE OF L2 LUMBAR VERTEBRA, WITH ROUTINE HEALING, SUBSEQUENT ENCOUNTER: ICD-10-CM

## 2017-10-24 DIAGNOSIS — M47.812 DJD (DEGENERATIVE JOINT DISEASE), CERVICAL: ICD-10-CM

## 2017-10-24 PROCEDURE — 99499 UNLISTED E&M SERVICE: CPT | Mod: S$PBB,,, | Performed by: NURSE PRACTITIONER

## 2017-10-24 PROCEDURE — 99213 OFFICE O/P EST LOW 20 MIN: CPT | Mod: PBBFAC | Performed by: NURSE PRACTITIONER

## 2017-10-24 PROCEDURE — 99999 PR PBB SHADOW E&M-EST. PATIENT-LVL III: CPT | Mod: PBBFAC,,, | Performed by: NURSE PRACTITIONER

## 2017-10-24 PROCEDURE — 99214 OFFICE O/P EST MOD 30 MIN: CPT | Mod: S$PBB,,, | Performed by: NURSE PRACTITIONER

## 2017-10-24 RX ORDER — FENTANYL 25 UG/1
1 PATCH TRANSDERMAL
Qty: 10 PATCH | Refills: 0 | Status: SHIPPED | OUTPATIENT
Start: 2017-10-24 | End: 2017-11-22 | Stop reason: SDUPTHER

## 2017-10-24 NOTE — PROGRESS NOTES
Chronic Pain - Follow Up     Referring Physician: Ayden Almonte MD     Chief Complaint   Patient presents with    Neck Pain         SUBJECTIVE: Disclaimer: This note has been generated using voice-recognition software. There may be typographical errors that have been missed during proof-reading    HPI    The patient presents today for follow up. He is s/p T1-2 IL VALERI on 10/2/2017. He reports 60% relief of his pain. He reports decreased burning pain. He also report better mobility in his neck. He reports that he misunderstood the medication directions and continued to wear the Fentanyl patches while taking Xtampza. He reports that during this time he was able to perform more of his daily activities. He reports that he was able to work in his garden which he has not done in months. He denies any adverse effects with Xytampza. He reports that this medication is much easier for him to take with his thickened food. He denies any issues with medication becoming stuck as he has had in the past. He recently had a barium swallow which showed difficulty swallowing a pill which became lodged in the vallecula. 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 He denies any other health changes. His pain today is 9/10.       Pain Description:     The pain is located in the neck upper back area and radiates to the across the chest.      At BEST 5/10      At WORST  7/10 on the WORST day.      On average pain is rated as 4/10.      Today the pain is rated as 5/10     The pain is described as aching, burning, numbing, sharp, shooting, tight band and deep       Symptoms interfere with daily activity, sleeping and work.      Exacerbating factors: Standing, Bending, Touching, Coughing/Sneezing, Eating, Walking, Night Time, Morning, Extension, Flexing and Lifting.      Mitigating factors heat, laying down, medications, rest and medications and injections (although he did not specify which  injections he has had in the past).      Patient denies urinary incontinence and bowel incontinence. Patient has been having decreased appetite treated with Megace     Patient denies any suicidal or homicidal ideations     Pain Medications:  Current:  Xytampza   Soma- Dr. Alfonso     Tried in Past:  NSAIDs - Celebrex and Ibuprofen  TCA - Yes  SNRI - Yes  Anti-convulsants -Gabapentin and Lyrica  Muscle Relaxants - Soma  Opioids- oxycodone     Physical Therapy/Home Exercise: no       report: Reviewed and consistent with medication use as prescribed.     Pain Procedures:   ESIs in the past (1994)  8/7/17 T1-T2 IL VALERI- 50% relief for one week  9/14/2017- L4-5 IL VALERI- no relief  10/2/2017- T1-2 IL VALERI     Chiropractor -never  Acupuncture - never  TENS unit -never  Spinal decompression -Cervical fusion  Joint replacement -never    Imaging:   MRI Cervical Spine 1/19/2017:  Stable anterior and interbody fusion of C5-6. 2 mm anterolisthesis at C4 which were not present on the prior study. Spondylosis at multiple cervical levels which has worsened since the prior study in 2012.     C2-3: A posterior disc protrusion is present causing moderate spinal stenosis, effacement of the anterior thecal sac and flattening of the anterior cervical cord.     C3-4: Posterior disc protrusion which causes moderate spinal stenosis, effacement of the anterior thecal sac, and flattening of the anterior cervical cord.     C4-5: Posterior disc osteophyte complex which has worsened significantly since the prior study and now causes moderate spinal stenosis, effacement of the anterior thecal sac, and flattening of the anterior cervical cord.    C6-7: Posterior disc osteophyte complex is stable when compared to the prior study. Thickening of the ligamentum flavum has worsened. Moderate spinal stenosis with effacement of the anterior thecal sac. Moderate to severe right-sided neural foraminal stenosis with suspected impingement of the exiting nerve  root.       MRI Thoracic Spine 1/19/2017:  An exaggerated thoracic kyphosis. Old superior endplate compression fracture to the T4 vertebral body. Superior endplate Schmorl's nodes are present at T1 and T2. Disc bulge at T3-4 which does not cause significant spinal or neural foraminal stenosis. Thick potentially calcified pleural plaques are present bilaterally along with airspace disease, atelectasis, and pulmonary nodularity. Dedicated imaging of the chest is recommended if this has not already been done.     MRI Lumbar Spine 1/19/2017:  A superior endplate compression fracture is present to the L2 vertebral body which has healed with a residual 30% loss of height. 2 mm retrolisthesis at L2.     L1-2: Annular bulge without spinal or neural foraminal stenosis.     L2-3 Annular bulge with mild spinal stenosis and no neural foraminal stenosis.    L3-4: Annular bulge with mild spinal stenosis and mild bilateral neural foraminal stenosis.     L4-5: Annular bulge and small posterior annular tear seen on image 11 of series 3. An annular defect is present to the anterior right lateral portion of the disc and was not seen on the prior study. Mild spinal stenosis and moderate bilateral neural foraminal stenosis.     L5-S1: Annular bulge with no spinal stenosis and mild left-sided neural foraminal stenosis.          REVIEW OF SYSTEMS:     GENERAL:  Weight loss and decreased appetite  HEENT:  No recent changes in vision or hearing  NECK:  difficulty with swallowing.  RESPIRATORY: Negative for cough, wheezing or shortness of breath, patient denies any recent URI.  CARDIOVASCULAR: Negative for chest pain, leg swelling or palpitations.  GI: Negative for abdominal discomfort, blood in stools or black stools or change in bowel habits.  MUSCULOSKELETAL: See HPI.  SKIN: Negative for lesions, rash, and itching.  PSYCH:  Depression and anxiety treated with citalopram, Wellbutrin, alprazolam.  Patient's sleep is disturbed secondary to  "pain.  HEMATOLOGY/LYMPHOLOGY: Negative for prolonged bleeding, bruising easily or swollen nodes.   ENDO: No history of diabetes or thyroid dysfunction  NEURO: No history of headaches, syncope, paralysis, seizures or tremors.  All other reviewed and negative other than HPI.     OBJECTIVE:    BP 99/62   Pulse 69   Temp 98.9 °F (37.2 °C)   Ht 5' 11" (1.803 m)   Wt 65.7 kg (144 lb 13.5 oz)   BMI 20.20 kg/m²     PHYSICAL EXAMINATION:    GENERAL: Well appearing, in no acute distress, alert and oriented x3.  PSYCH:  Mood and affect appropriate.  SKIN: Skin color, texture, turgor normal, no rashes or lesions.  HEAD/FACE:  Normocephalic, atraumatic. Cranial nerves grossly intact.  NECK: Pain to palpation over the cervical paraspinous muscles. Spurling positive on right side. Limited ROM with pain on flexion, extension, and lateral flexion. Positive facet loading bilaterally, R>L.  CV: RRR with palpation of the radial artery.  PULM: No evidence of respiratory difficulty, symmetric chest rise.  BACK: Straight leg raise in the supine position is positive for radicular pain bilaterally. There is pain with palpation over lumbar spine. Limited ROM with pain on flexion and extension. Positive facet loading bilaterally.   EXTREMITIES: Peripheral joint ROM is full and pain free without obvious instability or laxity in all four extremities. Trigger finger to left middle finger.  Good capillary refill.  MUSCULOSKELETAL:  No atrophy or tone abnormalities are noted.  NEURO: Bilateral upper extremity coordination and muscle stretch reflexes are physiologic and symmetric.  Darren's negative. No clonus.  No loss of sensation is noted.  GAIT: Antalgic, ambulates without assistance      ASSESSMENT: 70 y.o. year old male with pain, consistent with      Encounter Diagnoses   Name Primary?    Chronic pain syndrome     Osteoarthritis of spine with radiculopathy, lumbar region     Lumbosacral radiculopathy     DDD (degenerative disc " disease), lumbar     Closed compression fracture of L2 lumbar vertebra, with routine healing, subsequent encounter     Cervical radiculopathy     DJD (degenerative joint disease), cervical     Cervical stenosis of spinal canal     Encounter for long-term opiate analgesic use       PLAN:     - Previous imaging was reviewed and discussed with the patient today.    - He is s/p T1-2 IL VALERI with benefit. We can repeat this in the future.     - Fentanyl 25 mcg/hr patch every 72 hrs, #10, 0 refills.     - Xtampza 36 mg every 12 hours, #60, 0 refills. He may open the capsules and sprinkle the powder into pudding secondary to impaired swallowing.    - The patient is here today for a refill of current pain medications and they believe these provide effective pain control and improvements in quality of life.  The patient notes no serious side effects, and feels the benefits outweigh the risks.  The patient was reminded of the pain contract that they signed previously as well as the risks and benefits of the medication including possible death.  The updated [place] Board  Pharmacy prescription monitoring program was reviewed, and the patient has been found to be compliant with current treatment plan. Medication management provided by Dr. Dietz.     - UDS from 3/1/17 reviewed and compliant.  UDS next OV.    - Can continue Soma from Dr. Alfonso and Xanax from Dr. Browne.    - Continue to follow up with Dr. Browne.     - RTC in 1 month.     - The patient will continue a home exercise routine to help with pain and strengthening.      - Dr. Dietz was consulted on the patient and agrees with this plan.    The above plan and management options were discussed at length with patient. Patient is in agreement with the above and verbalized understanding.     Janis Loera NP  10/24/2017

## 2017-10-25 ENCOUNTER — OFFICE VISIT (OUTPATIENT)
Dept: PAIN MEDICINE | Facility: CLINIC | Age: 72
End: 2017-10-25
Payer: MEDICARE

## 2017-10-25 VITALS
HEIGHT: 71 IN | SYSTOLIC BLOOD PRESSURE: 89 MMHG | DIASTOLIC BLOOD PRESSURE: 50 MMHG | WEIGHT: 143.94 LBS | RESPIRATION RATE: 18 BRPM | HEART RATE: 69 BPM | TEMPERATURE: 98 F | BODY MASS INDEX: 20.15 KG/M2

## 2017-10-25 DIAGNOSIS — F11.20 OPIOID TYPE DEPENDENCE, CONTINUOUS USE: Primary | ICD-10-CM

## 2017-10-25 PROCEDURE — 99213 OFFICE O/P EST LOW 20 MIN: CPT | Mod: S$PBB,,, | Performed by: PSYCHIATRY & NEUROLOGY

## 2017-10-25 PROCEDURE — 99213 OFFICE O/P EST LOW 20 MIN: CPT | Mod: PBBFAC | Performed by: PSYCHIATRY & NEUROLOGY

## 2017-10-25 PROCEDURE — 99999 PR PBB SHADOW E&M-EST. PATIENT-LVL III: CPT | Mod: PBBFAC,,, | Performed by: PSYCHIATRY & NEUROLOGY

## 2017-10-25 RX ORDER — ALPRAZOLAM 2 MG/1
2 TABLET ORAL 2 TIMES DAILY PRN
Qty: 60 TABLET | Refills: 0 | Status: SHIPPED | OUTPATIENT
Start: 2017-10-25 | End: 2018-01-16 | Stop reason: SDUPTHER

## 2017-10-25 NOTE — PROGRESS NOTES
Outpatient Psychiatry Follow-Up Visit (MD/NP)    10/25/2017    Clinical Status of Patient:  Outpatient (Ambulatory)    Chief Complaint:  Griffin Hall is a 72 y.o. male who presents today for follow-up of anxiety and opioid and benzodiazepine dependence.  Met with patient.      Interval History and Content of Current Session:  Interim Events/Subjective Report/Content of Current Session: Pt  reports that Dr Dietz has him on medication that is working for him and that he is now getting a lot more relied from his pain. That he is taking his medications as prescribed. Discussed the medications, he reports that he takes the xanax more as a muscle relaxant. Have discussed with him the Dr Dietz might not be comfortable with renewing the prescription for xanax and that he will need to follow up with a psychiatrist. 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     Psychotherapy:  · Target symptoms: opioid dependence, benzodiazepine dependence  · Why chosen therapy is appropriate versus another modality: relevant to diagnosis, patient responds to this modality  · Outcome monitoring methods: self-report, observation, checklist/rating scale  · Therapeutic intervention type: behavior modifying psychotherapy, supportive psychotherapy  · Topics discussed/themes: stress related to medical comorbidities, difficulty managing affect in interpersonal relationships, building skills sets for symptom management, symptom recognition  · The patient's response to the intervention is accepting. The patient's progress toward treatment goals is fair.   · Duration of intervention: 30 minutes.    Review of Systems   · PSYCHIATRIC: Pertinant items are noted in the narrative.    Past Medical, Family and Social History: The patient's past medical, family and social history have been reviewed and updated as appropriate within the electronic medical record - see  "encounter notes.    Compliance: yes    Side effects: None    Risk Parameters:  Patient reports no suicidal ideation  Patient reports no homicidal ideation  Patient reports no self-injurious behavior  Patient reports no violent behavior    Exam (detailed: at least 9 elements; comprehensive: all 15 elements)   Constitutional  Vitals:  Most recent vital signs, dated less than 90 days prior to this appointment, were reviewed.   Vitals:    10/25/17 0941   BP: (!) 89/50   Pulse: 69   Resp: 18   Temp: 97.5 °F (36.4 °C)   TempSrc: Oral   Weight: 65.3 kg (143 lb 15.4 oz)   Height: 5' 11" (1.803 m)        General:  age appropriate, casually dressed, neatly groomed     Musculoskeletal  Muscle Strength/Tone:  no tremor, no tic   Gait & Station:  non-ataxic     Psychiatric  Speech:  no latency; no press   Mood & Affect:  "much better"  congruent and appropriate   Thought Process:  normal and logical, goal-directed   Associations:  intact   Thought Content:  normal, no suicidality, no homicidality, delusions, or paranoia   Insight:  intact   Judgement: behavior is adequate to circumstances   Orientation:  grossly intact   Memory: intact for content of interview   Language: grossly intact   Attention Span & Concentration:  able to focus   Fund of Knowledge:  intact and appropriate to age and level of education     Assessment and Diagnosis   Status/Progress: Based on the examination today, the patient's problem(s) is/are inadequately controlled.  New problems have been presented today.   Co-morbidities are complicating management of the primary condition.  There are no active rule-out diagnoses for this patient at this time.            Impression: Pt is a 70 Y/O CM with PMHx sig for H/O head and neck cancer, chronic pain of both shoulders, cervical stenosis of spinal cord with           Opioid dependence in a controlled environment.  Benzodiazepine dependence   BETTYE              Strengths and Liabilities: Strength: Patient accepts " guidance/feedback, Strength: Patient is expressive/articulate., Strength: Patient is intelligent., Strength: Patient is motivated for change., Strength: Patient has positive support network., Strength: Patient has reasonable judgment.      Treatment Goals: Specify outcomes written in observable, behavioral terms:   Anxiety: acquiring relapse prevention skills, eliminating all anxiety symptoms (SCL-90-R scores in normal range), reducing negative automatic thoughts, reducing physical symptoms of anxiety and reducing time spent worrying (<30 minutes/day)  Safety: to take medications only as prescribed by his MDs      Treatment Plan/Recommendations:   · Medication Management: Continue current medications. The risks and benefits of medication were discussed with the patient.  · Referral for further treatment to social work team for psychotherapy  · The treatment plan and follow up plan were reviewed with the patient.   · Pt will continue to F/U with Dr Dietz.    · Cont the xanax 2 mg upto twice daily, pt reports it also helps as a muscle relaxant.  · Will cont to monitor  · Reviewed [place] .  · Discussed chronic pain rehabilitation.  · Discussed coping skills.         Pt will make an apointment with another psychiatrist/ Primary Care MD.      Return to Clinic: Follow up with Dr Dietz.

## 2017-11-22 ENCOUNTER — OFFICE VISIT (OUTPATIENT)
Dept: PAIN MEDICINE | Facility: CLINIC | Age: 72
End: 2017-11-22
Payer: MEDICARE

## 2017-11-22 VITALS — WEIGHT: 140 LBS | BODY MASS INDEX: 19.6 KG/M2 | RESPIRATION RATE: 16 BRPM | HEIGHT: 71 IN | TEMPERATURE: 97 F

## 2017-11-22 DIAGNOSIS — M48.02 CERVICAL STENOSIS OF SPINAL CANAL: ICD-10-CM

## 2017-11-22 DIAGNOSIS — M47.26 OSTEOARTHRITIS OF SPINE WITH RADICULOPATHY, LUMBAR REGION: ICD-10-CM

## 2017-11-22 DIAGNOSIS — G89.4 CHRONIC PAIN SYNDROME: Primary | ICD-10-CM

## 2017-11-22 DIAGNOSIS — Z85.89 HISTORY OF HEAD AND NECK CANCER: ICD-10-CM

## 2017-11-22 DIAGNOSIS — Z79.891 ENCOUNTER FOR LONG-TERM OPIATE ANALGESIC USE: ICD-10-CM

## 2017-11-22 DIAGNOSIS — S32.020D CLOSED COMPRESSION FRACTURE OF L2 LUMBAR VERTEBRA, WITH ROUTINE HEALING, SUBSEQUENT ENCOUNTER: ICD-10-CM

## 2017-11-22 DIAGNOSIS — M51.36 DDD (DEGENERATIVE DISC DISEASE), LUMBAR: ICD-10-CM

## 2017-11-22 DIAGNOSIS — M50.30 DDD (DEGENERATIVE DISC DISEASE), CERVICAL: ICD-10-CM

## 2017-11-22 DIAGNOSIS — M54.17 LUMBOSACRAL RADICULOPATHY: ICD-10-CM

## 2017-11-22 DIAGNOSIS — M47.812 DJD (DEGENERATIVE JOINT DISEASE), CERVICAL: ICD-10-CM

## 2017-11-22 DIAGNOSIS — M54.12 CERVICAL RADICULOPATHY: ICD-10-CM

## 2017-11-22 PROCEDURE — 99214 OFFICE O/P EST MOD 30 MIN: CPT | Mod: S$PBB,,, | Performed by: NURSE PRACTITIONER

## 2017-11-22 PROCEDURE — 99499 UNLISTED E&M SERVICE: CPT | Mod: S$PBB,,, | Performed by: NURSE PRACTITIONER

## 2017-11-22 PROCEDURE — 99999 PR PBB SHADOW E&M-EST. PATIENT-LVL III: CPT | Mod: PBBFAC,,, | Performed by: NURSE PRACTITIONER

## 2017-11-22 PROCEDURE — 99213 OFFICE O/P EST LOW 20 MIN: CPT | Mod: PBBFAC | Performed by: NURSE PRACTITIONER

## 2017-11-22 RX ORDER — GABAPENTIN 300 MG/1
300 CAPSULE ORAL 3 TIMES DAILY
Qty: 90 CAPSULE | Refills: 6 | Status: SHIPPED | OUTPATIENT
Start: 2017-11-22 | End: 2018-03-07 | Stop reason: SDUPTHER

## 2017-11-22 RX ORDER — FENTANYL 25 UG/1
1 PATCH TRANSDERMAL
Qty: 10 PATCH | Refills: 0 | Status: SHIPPED | OUTPATIENT
Start: 2017-11-22 | End: 2017-12-21 | Stop reason: SDUPTHER

## 2017-11-22 NOTE — PROGRESS NOTES
Chronic Pain - Follow Up     Referring Physician: Ayden Almonte MD     Chief Complaint   Patient presents with    Low-back Pain    Neck Pain         SUBJECTIVE: Disclaimer: This note has been generated using voice-recognition software. There may be typographical errors that have been missed during proof-reading      The patient presents today for follow up for neck and low back pain. He continues to report neck and low back pain. He reports that his low back pain is worse today. He does report pain that radiates down the side of both legs to his ankle. He continue to report benefit of his neck pain with recent VALERI. He reports intermittent radiating arm pain that is tolerable with his current regimen. He continues to take Fentanyl and Xtampza with benefit. He denies any adverse effects. He does take the Xtampza with pudding as a previous barium swallow proved pills becoming lodged in the vallecula. 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 He denies any other health changes. He denies any bowel or bladder incontinence. His pain today is 9/10.       Pain Medications:  Current:  Xytampza   Fentanyl   Gabapentin  Soma- Dr. Alfonso     Tried in Past:  NSAIDs - Celebrex and Ibuprofen  TCA - Yes  SNRI - Yes  Anti-convulsants -Gabapentin and Lyrica  Muscle Relaxants - Soma  Opioids- oxycodone     Physical Therapy/Home Exercise: no       report: Reviewed and consistent with medication use as prescribed.     Pain Procedures:   ESIs in the past (1994)  8/7/17 T1-T2 IL VALERI- 50% relief for one week  9/14/2017- L4-5 IL VALERI- no relief  10/2/2017- T1-2 IL VALERI     Chiropractor -never  Acupuncture - never  TENS unit -never  Spinal decompression -Cervical fusion  Joint replacement -never    Imaging:   MRI Cervical Spine 1/19/2017:  Stable anterior and interbody fusion of C5-6. 2 mm anterolisthesis at C4 which were not present on the prior study. Spondylosis at multiple cervical  levels which has worsened since the prior study in 2012.     C2-3: A posterior disc protrusion is present causing moderate spinal stenosis, effacement of the anterior thecal sac and flattening of the anterior cervical cord.     C3-4: Posterior disc protrusion which causes moderate spinal stenosis, effacement of the anterior thecal sac, and flattening of the anterior cervical cord.     C4-5: Posterior disc osteophyte complex which has worsened significantly since the prior study and now causes moderate spinal stenosis, effacement of the anterior thecal sac, and flattening of the anterior cervical cord.    C6-7: Posterior disc osteophyte complex is stable when compared to the prior study. Thickening of the ligamentum flavum has worsened. Moderate spinal stenosis with effacement of the anterior thecal sac. Moderate to severe right-sided neural foraminal stenosis with suspected impingement of the exiting nerve root.       MRI Thoracic Spine 1/19/2017:  An exaggerated thoracic kyphosis. Old superior endplate compression fracture to the T4 vertebral body. Superior endplate Schmorl's nodes are present at T1 and T2. Disc bulge at T3-4 which does not cause significant spinal or neural foraminal stenosis. Thick potentially calcified pleural plaques are present bilaterally along with airspace disease, atelectasis, and pulmonary nodularity. Dedicated imaging of the chest is recommended if this has not already been done.     MRI Lumbar Spine 1/19/2017:  A superior endplate compression fracture is present to the L2 vertebral body which has healed with a residual 30% loss of height. 2 mm retrolisthesis at L2.     L1-2: Annular bulge without spinal or neural foraminal stenosis.     L2-3 Annular bulge with mild spinal stenosis and no neural foraminal stenosis.    L3-4: Annular bulge with mild spinal stenosis and mild bilateral neural foraminal stenosis.     L4-5: Annular bulge and small posterior annular tear seen on image 11 of  series 3. An annular defect is present to the anterior right lateral portion of the disc and was not seen on the prior study. Mild spinal stenosis and moderate bilateral neural foraminal stenosis.     L5-S1: Annular bulge with no spinal stenosis and mild left-sided neural foraminal stenosis.       CMP  Sodium   Date Value Ref Range Status   04/18/2017 142 136 - 145 mmol/L Final     Potassium   Date Value Ref Range Status   04/18/2017 3.6 3.5 - 5.1 mmol/L Final     Chloride   Date Value Ref Range Status   04/18/2017 98 95 - 110 mmol/L Final     CO2   Date Value Ref Range Status   04/18/2017 32 (H) 23 - 29 mmol/L Final     Glucose   Date Value Ref Range Status   04/18/2017 84 70 - 110 mg/dL Final     BUN, Bld   Date Value Ref Range Status   04/18/2017 13 8 - 23 mg/dL Final     Creatinine   Date Value Ref Range Status   04/18/2017 0.8 0.5 - 1.4 mg/dL Final     Calcium   Date Value Ref Range Status   04/18/2017 9.2 8.7 - 10.5 mg/dL Final     Total Protein   Date Value Ref Range Status   04/18/2017 7.5 6.0 - 8.4 g/dL Final     Albumin   Date Value Ref Range Status   04/18/2017 3.6 3.5 - 5.2 g/dL Final     Total Bilirubin   Date Value Ref Range Status   04/18/2017 0.3 0.1 - 1.0 mg/dL Final     Comment:     For infants and newborns, interpretation of results should be based  on gestational age, weight and in agreement with clinical  observations.  Premature Infant recommended reference ranges:  Up to 24 hours.............<8.0 mg/dL  Up to 48 hours............<12.0 mg/dL  3-5 days..................<15.0 mg/dL  6-29 days.................<15.0 mg/dL       Alkaline Phosphatase   Date Value Ref Range Status   04/18/2017 120 55 - 135 U/L Final     AST   Date Value Ref Range Status   04/18/2017 26 10 - 40 U/L Final     ALT   Date Value Ref Range Status   04/18/2017 19 10 - 44 U/L Final     Anion Gap   Date Value Ref Range Status   04/18/2017 12 8 - 16 mmol/L Final     eGFR if    Date Value Ref Range Status  "  04/18/2017 >60.0 >60 mL/min/1.73 m^2 Final     eGFR if non    Date Value Ref Range Status   04/18/2017 >60.0 >60 mL/min/1.73 m^2 Final     Comment:     Calculation used to obtain the estimated glomerular filtration  rate (eGFR) is the CKD-EPI equation. Since race is unknown   in our information system, the eGFR values for   -American and Non--American patients are given   for each creatinine result.            REVIEW OF SYSTEMS:     GENERAL:  Weight loss and decreased appetite  HEENT:  No recent changes in vision or hearing  NECK:  difficulty with swallowing.  RESPIRATORY: Negative for cough, wheezing or shortness of breath, patient denies any recent URI.  CARDIOVASCULAR: Negative for chest pain, leg swelling or palpitations.  GI: Negative for abdominal discomfort, blood in stools or black stools or change in bowel habits.  MUSCULOSKELETAL: See HPI.  SKIN: Negative for lesions, rash, and itching.  PSYCH:  Depression and anxiety treated with citalopram, Wellbutrin, alprazolam.  Patient's sleep is disturbed secondary to pain.  HEMATOLOGY/LYMPHOLOGY: Negative for prolonged bleeding, bruising easily or swollen nodes.   ENDO: No history of diabetes or thyroid dysfunction  NEURO: No history of headaches, syncope, paralysis, seizures or tremors.  All other reviewed and negative other than HPI.     OBJECTIVE:    Temp 97.2 °F (36.2 °C) (Oral)   Resp 16   Ht 5' 11" (1.803 m)   Wt 63.5 kg (140 lb)   BMI 19.53 kg/m²     PHYSICAL EXAMINATION:    GENERAL: Well appearing, in no acute distress, alert and oriented x3.  PSYCH:  Mood and affect appropriate.  SKIN: Skin color, texture, turgor normal, no rashes or lesions.  HEAD/FACE:  Normocephalic, atraumatic. Cranial nerves grossly intact.  NECK: Pain to palpation over the cervical paraspinous muscles. Spurling positive on right side. Limited ROM with pain on extension, and lateral flexion. Positive facet loading bilaterally.  CV: RRR with " "palpation of the radial artery.  PULM: No evidence of respiratory difficulty, symmetric chest rise.  BACK: Straight leg raise in the supine position is positive for radicular pain bilaterally. There is pain with palpation over lumbar spine. Limited ROM with pain on flexion and extension. Positive facet loading bilaterally.   EXTREMITIES: Peripheral joint ROM is full and pain free without obvious instability or laxity in all four extremities. Trigger finger to left middle finger.  Good capillary refill.  MUSCULOSKELETAL:  No atrophy or tone abnormalities are noted.  NEURO: Bilateral upper extremity coordination and muscle stretch reflexes are physiologic and symmetric.  Darren's negative. No clonus.  No loss of sensation is noted.  GAIT: Antalgic, ambulates without assistance      ASSESSMENT: 70 y.o. year old male with pain, consistent with      Encounter Diagnoses   Name Primary?    Chronic pain syndrome Yes    Osteoarthritis of spine with radiculopathy, lumbar region     Lumbosacral radiculopathy     DDD (degenerative disc disease), lumbar     Closed compression fracture of L2 lumbar vertebra, with routine healing, subsequent encounter     Cervical radiculopathy     DJD (degenerative joint disease), cervical     Cervical stenosis of spinal canal     History of head and neck cancer     DDD (degenerative disc disease), cervical     Encounter for long-term opiate analgesic use       PLAN:     - Previous imaging was reviewed and discussed with the patient today. Previous CMP reviewed.     - Schedule for L4-5 IL VALERI. The procedure, risks, benefits and options were discussed with patient. There are no contraindications to the procedure. The patient expressed understanding and agreed to proceed.  Consent obtained today.    - He is s/p T1-2 IL VALERI with benefit. We can repeat this in the future.     - Continue Gabapentin 300 mg TID. Refill provided today.     - I will start him on a compounding cream ("green") " to apply to affected areas up to QID.    - Fentanyl 25 mcg/hr patch every 72 hrs, #10, 0 refills.     - Xtampza 36 mg every 12 hours, #60, 0 refills. He may open the capsules and sprinkle the powder into pudding secondary to impaired swallowing.    - The patient is here today for a refill of current pain medications and they believe these provide effective pain control and improvements in quality of life.  The patient notes no serious side effects, and feels the benefits outweigh the risks.  The patient was reminded of the pain contract that they signed previously as well as the risks and benefits of the medication including possible death.  The updated [place] Board  Pharmacy prescription monitoring program was reviewed, and the patient has been found to be compliant with current treatment plan. Medication management provided by Dr. Dietz.     - UDS from 3/1/17 reviewed and compliant.  UDS performed today.     - Can continue Soma from Dr. Alfonso and Xanax from Dr. Browne.    - Continue to follow up with Dr. Browne.     - RTC in 1 month.     - The patient will continue a home exercise routine to help with pain and strengthening.      - Dr. Dietz was consulted on the patient and agrees with this plan.    The above plan and management options were discussed at length with patient. Patient is in agreement with the above and verbalized understanding.     Janis Loera NP  11/22/2017

## 2017-11-24 ENCOUNTER — NURSE TRIAGE (OUTPATIENT)
Dept: ADMINISTRATIVE | Facility: CLINIC | Age: 72
End: 2017-11-24

## 2017-11-24 ENCOUNTER — TELEPHONE (OUTPATIENT)
Dept: PAIN MEDICINE | Facility: CLINIC | Age: 72
End: 2017-11-24

## 2017-11-24 NOTE — TELEPHONE ENCOUNTER
Contacted and left message for pt regarding call. Pt was informed via voice mail to contact our office back.

## 2017-11-24 NOTE — TELEPHONE ENCOUNTER
----- Message from Lisette Maguire sent at 11/24/2017 11:37 AM CST -----  _x  1st Request  _  2nd Request  _  3rd Request        Who: roseanne    Why: pt. Calling to speak with norma regarding rx.please call to discuss    What Number to Call Back:[phone]    When to Expect a call back: (Within 24 hours)    Please return the call at earliest convenience. Thanks!

## 2017-11-25 NOTE — TELEPHONE ENCOUNTER
Pt called re rx. Given rx wed-Xtampza 36 mg every 12 hours. Pharm states that ins comp states it too early to fill. Pt in severe pain. Pt without med x 2 days. Pt states that he has been cancer pt x 10 years. Pt requests pain mgmt doc to meet him ED. No one on call for pain mgmt per  rec ED - request pain mgmt assistance in ED. Pt notified. Pt states that he can walk but very weak. Pt declines ambulance. Pt going to see NS in dec. Call back with questions.     Reason for Disposition   Nursing judgment or information in reference    Protocols used: ST NO GUIDELINE AVAILABLE - SICK ADULT CALL-A-AH

## 2017-11-27 ENCOUNTER — TELEPHONE (OUTPATIENT)
Dept: PAIN MEDICINE | Facility: CLINIC | Age: 72
End: 2017-11-27

## 2017-11-27 NOTE — TELEPHONE ENCOUNTER
Called and spoke with pt today he states this medicine is hard to get for now but, the pharmacy didn't having it in stock. Pt has a schedule appt on 29th of November with you. He states he's ok for now and will discuss this with you on Wednesday. INO.  Griffin Hall [phone]  Dilia Cardoso, RN 3 days ago      Dilia Cardoso, RN 3 days ago      Pt called re rx. Given rx wed-Xtampza 36 mg every 12 hours. Pharm states that ins comp states it too early to fill. Pt in severe pain. Pt without med x 2 days. Pt states that he has been cancer pt x 10 years. Pt requests pain mgmt doc to meet him ED. No one on call for pain mgmt per  rec ED - request pain mgmt assistance in ED. Pt notified. Pt states that he can walk but very weak. Pt declines ambulance. Pt going to see NS in dec. Call back with questions.      Reason for Disposition   Nursing judgment or information in reference    Protocols used: ST NO GUIDELINE AVAILABLE - SICK ADULT CALL-A-AH           Documentation

## 2017-11-28 ENCOUNTER — LAB VISIT (OUTPATIENT)
Dept: LAB | Facility: HOSPITAL | Age: 72
End: 2017-11-28
Attending: NURSE PRACTITIONER
Payer: MEDICARE

## 2017-11-28 DIAGNOSIS — Z79.891 ENCOUNTER FOR LONG-TERM OPIATE ANALGESIC USE: ICD-10-CM

## 2017-11-28 DIAGNOSIS — G89.4 CHRONIC PAIN SYNDROME: ICD-10-CM

## 2017-11-28 PROCEDURE — 80307 DRUG TEST PRSMV CHEM ANLYZR: CPT

## 2017-11-29 ENCOUNTER — TELEPHONE (OUTPATIENT)
Dept: PAIN MEDICINE | Facility: CLINIC | Age: 72
End: 2017-11-29

## 2017-11-29 NOTE — TELEPHONE ENCOUNTER
Spoke with pt today in clinic letting him know he was referred to see his PCP with Dr. Esparza, but he wanted someone at this location. Pt schedule with Dr. Pacheco and his schedule  with Dr. Taylor Beasley for psychiatry on 2/2/18. Pt requesting a refill for ALPRAZolam (XANAX) 2 MG Tab he's states he's out of medicine. He would like to know if you will approve a refill?  Pt advised that this Rx may not be approved.

## 2017-11-29 NOTE — TELEPHONE ENCOUNTER
----- Message from Arnav Kaufman sent at 11/29/2017  2:15 PM CST -----  Contact: Pt  _x  1st Request  _  2nd Request  _  3rd Request        Who: BERKLEY STEEN [[id number]]    Why: Returning a call back from your office. Please return the call at earliest convenience.   Thanks!    What Number to Call Back:[phone] (M)    When to Expect a call back: (With in 24 hours)

## 2017-11-29 NOTE — TELEPHONE ENCOUNTER
Called and spoke with pt today letting him know per Dr. Browne renewed refill for ALPRAZolam (XANAX) 2 MG Tab disp #30 take 1-2 tablet as needed. Also, pt was advised that Dr. Browne will call him tomorrow to discuss the decrease with his medicine. Medication called to   [address] [phone] (Phone)   Today.

## 2017-12-03 LAB
6MAM UR QL: NOT DETECTED
7AMINOCLONAZEPAM UR QL: NOT DETECTED
A-OH ALPRAZ UR QL: PRESENT
ALPRAZ UR QL: PRESENT
AMPHET UR QL SCN: NOT DETECTED
ANNOTATION COMMENT IMP: NORMAL
ANNOTATION COMMENT IMP: NORMAL
BARBITURATES UR QL: NOT DETECTED
BUPRENORPHINE UR QL: NOT DETECTED
BZE UR QL: NOT DETECTED
CARBOXYTHC UR QL: NOT DETECTED
CARISOPRODOL UR QL: NOT DETECTED
CLONAZEPAM UR QL: NOT DETECTED
CODEINE UR QL: NOT DETECTED
CREAT UR-MCNC: 31.8 MG/DL (ref 20–400)
DIAZEPAM UR QL: NOT DETECTED
ETHYL GLUCURONIDE UR QL: NOT DETECTED
FENTANYL UR QL: PRESENT
HYDROCODONE UR QL: NOT DETECTED
HYDROMORPHONE UR QL: NOT DETECTED
LORAZEPAM UR QL: NOT DETECTED
MDA UR QL: NOT DETECTED
MDEA UR QL: NOT DETECTED
MDMA UR QL: NOT DETECTED
ME-PHENIDATE UR QL: NOT DETECTED
MEPERIDINE UR QL: NOT DETECTED
METHADONE UR QL: NOT DETECTED
METHAMPHET UR QL: NOT DETECTED
MIDAZOLAM UR QL SCN: NOT DETECTED
MORPHINE UR QL: NOT DETECTED
NORBUPRENORPHINE UR QL CFM: NOT DETECTED
NORDIAZEPAM UR QL: NOT DETECTED
NORFENTANYL UR QL: PRESENT
NORHYDROCODONE UR QL CFM: NOT DETECTED
NOROXYCODONE UR QL CFM: PRESENT
NOROXYMORPHONE: PRESENT
OXAZEPAM UR QL: NOT DETECTED
OXYCODONE UR QL: PRESENT
OXYMORPHONE UR QL: PRESENT
PATHOLOGY STUDY: NORMAL
PCP UR QL: NOT DETECTED
PHENTERMINE UR QL: NOT DETECTED
PROPOXYPH UR QL: NOT DETECTED
SERVICE CMNT-IMP: NORMAL
TAPENTADOL UR QL SCN: NOT DETECTED
TAPENTADOL-O-SULF: NOT DETECTED
TEMAZEPAM UR QL: NOT DETECTED
TRAMADOL UR QL: NOT DETECTED
ZOLPIDEM UR QL: NOT DETECTED

## 2017-12-06 ENCOUNTER — OFFICE VISIT (OUTPATIENT)
Dept: NEUROSURGERY | Facility: CLINIC | Age: 72
End: 2017-12-06
Payer: MEDICARE

## 2017-12-06 VITALS
WEIGHT: 140 LBS | BODY MASS INDEX: 19.6 KG/M2 | TEMPERATURE: 98 F | DIASTOLIC BLOOD PRESSURE: 71 MMHG | HEART RATE: 71 BPM | HEIGHT: 71 IN | SYSTOLIC BLOOD PRESSURE: 109 MMHG

## 2017-12-06 DIAGNOSIS — M41.20 OTHER IDIOPATHIC SCOLIOSIS, UNSPECIFIED SPINAL REGION: ICD-10-CM

## 2017-12-06 DIAGNOSIS — M54.12 CERVICAL RADICULOPATHY: ICD-10-CM

## 2017-12-06 DIAGNOSIS — M41.00 INFANTILE IDIOPATHIC SCOLIOSIS, UNSPECIFIED SPINAL REGION: Primary | ICD-10-CM

## 2017-12-06 DIAGNOSIS — M47.816 LUMBAR SPONDYLOSIS: ICD-10-CM

## 2017-12-06 DIAGNOSIS — M54.16 LUMBAR RADICULOPATHY: ICD-10-CM

## 2017-12-06 PROCEDURE — 99204 OFFICE O/P NEW MOD 45 MIN: CPT | Mod: S$PBB,,, | Performed by: NEUROLOGICAL SURGERY

## 2017-12-06 PROCEDURE — 99214 OFFICE O/P EST MOD 30 MIN: CPT | Mod: PBBFAC | Performed by: NEUROLOGICAL SURGERY

## 2017-12-06 PROCEDURE — 99999 PR PBB SHADOW E&M-EST. PATIENT-LVL IV: CPT | Mod: PBBFAC,,, | Performed by: NEUROLOGICAL SURGERY

## 2017-12-06 NOTE — PROGRESS NOTES
"Subjective:    I, Leyla Romero, am scribing for, and in the presence of, Dr. Craig Quintanilla.     Patient ID: Griffin Hall is a 72 y.o. male.    Chief Complaint: Lumbar Spine Pain (L-Spine)    HPI   Pt is a 71 yo male who presents today for consultation. Pt was referred by Janis Loera NP. Pt complains of low back pain that radiates to b/l legs, worsening on the left side. Pt also complains of neck pain along with upper back pain that radiates to upper extremities, worsening in RUE with numbness and weakness. He describes his RUE numbness as "pins and needles." Around August 2005, pt reports undergoing neck cancer surgery. Pt has FU for his spine issues, but has not received any relief. In the past, the pt has completed physical therapy and received injections, but denies permanent relief.       Review of Systems   Constitutional: Negative for activity change, fatigue and fever.   HENT: Negative for facial swelling.    Eyes: Negative.    Respiratory: Negative.    Cardiovascular: Negative.    Gastrointestinal: Negative for diarrhea, nausea and vomiting.   Genitourinary: Negative.    Musculoskeletal: Positive for back pain, myalgias and neck pain. Negative for joint swelling.   Neurological: Positive for weakness and numbness. Negative for seizures and headaches.   Psychiatric/Behavioral: Negative.        Past Medical History:   Diagnosis Date    Anxiety     Cancer     Degenerative disc disease     Depression     Tobacco use 6/16/2016       Objective:     /71   Pulse 71   Temp 97.8 °F (36.6 °C)   Ht 5' 11" (1.803 m)   Wt 63.5 kg (140 lb)   BMI 19.53 kg/m²     Physical Exam   Constitutional: He is oriented to person, place, and time. He appears well-developed and well-nourished.   HENT:   Head: Normocephalic and atraumatic.   Neck: Neck supple.   Neurological: He is alert and oriented to person, place, and time. No cranial nerve deficit. He displays a negative Romberg sign. GCS eye subscore is 4. GCS " verbal subscore is 5. GCS motor subscore is 6.       Imaging:  MRI L-spine Without Contrast 10/19/2017 shows mild degeneration along with retrolisthesis L2-L3.    MRI C-spine Without Contrast 10/19/2017 shows degenerative change, disc bulge at C2/3, and anterior retrolisthesis at C4/5.    I have personally reviewed the images with the pt.      I, Dr. Craig Quintanilla, personally performed the services described in this documentation as scribed by Leyla Romero in my presence, and it is both accurate and complete.    Assessment:       1. Infantile idiopathic scoliosis, unspecified spinal region    2. Other idiopathic scoliosis, unspecified spinal region    3. Cervical radiculopathy    4. Lumbar spondylosis        Plan:   I have reviewed the patient's MRI of L-spine, which shows mild degeneration along with retrolisthesis L2-L3. I have also reviewed the patient's MRI of C-spine, which shows degenerative change, disc bulge at C2/3, and anterior retrolisthesis at C4/5.    I will schedule the patient a FU after EMG Nerve Conduction Study and X-ray Scoliosis Series.

## 2017-12-06 NOTE — LETTER
December 11, 2017      Janis Loera, NP  [address]  [address]  [facility] [address]           [facility] - Neurosurgery 7th Fl  [address]  [place] [address]  Phone: [phone]          Patient: Griffin Hall   MR Number: [id number]   YOB: 1945   Date of Visit: 12/6/2017       Dear Janis Loera:    Thank you for referring Griffin Hall to me for evaluation. Attached you will find relevant portions of my assessment and plan of care.    If you have questions, please do not hesitate to call me. I look forward to following Griffin Hall along with you.    Sincerely,    Craig Quintanilla MD    Enclosure  CC:  No Recipients    If you would like to receive this communication electronically, please contact [email or web address] or [phone] to request more information on [employer] Link access.    For providers and/or their staff who would like to refer a patient to [facility], please contact us through our one-stop-shop provider referral line, [facility] , at [phone].    If you feel you have received this communication in error or would no longer like to receive these types of communications, please e-mail [email or web address]

## 2017-12-07 ENCOUNTER — TELEPHONE (OUTPATIENT)
Dept: NEUROSURGERY | Facility: CLINIC | Age: 72
End: 2017-12-07

## 2017-12-07 DIAGNOSIS — M48.02 CERVICAL STENOSIS OF SPINAL CANAL: Primary | ICD-10-CM

## 2017-12-12 ENCOUNTER — TELEPHONE (OUTPATIENT)
Dept: PAIN MEDICINE | Facility: CLINIC | Age: 72
End: 2017-12-12

## 2017-12-12 NOTE — TELEPHONE ENCOUNTER
Attempted to call pt today to see how he's doing and left him a message to call Dr. Pavan TOLBERT at [phone].

## 2017-12-13 ENCOUNTER — TELEPHONE (OUTPATIENT)
Dept: PAIN MEDICINE | Facility: CLINIC | Age: 72
End: 2017-12-13

## 2017-12-13 NOTE — TELEPHONE ENCOUNTER
----- Message from Juan Bolivar sent at 12/12/2017  1:59 PM CST -----  Contact: Griffin Hall  X_  1st Request  _  2nd Request  _  3rd Request        Who: Griffin Hall    Why: Patient returning phone call to follow up on condition     What Number to Call Back: [phone]    When to Expect a call back: (Within 24 hours)    Please return the call at earliest convenience. Thanks!

## 2017-12-13 NOTE — TELEPHONE ENCOUNTER
----- Message from Katt Bennett sent at 12/12/2017 10:45 AM CST -----  Contact: BERKLEY STEEN [[id number]]  _  1st Request  _  2nd Request  _  3rd Request        Who: BERKLEY STEEN [[id number]]    Why: Patient states he missed a call and is not sure if its from e.     What Number to Call Back: [phone]    When to Expect a call back: (Before the end of the day)   -- if call after 3:00 call back will be tomorrow.

## 2017-12-13 NOTE — TELEPHONE ENCOUNTER
Attempted to call patient today to f/u on how he's doing per Dr. Browne. Left him a message to call back at [phone].

## 2017-12-21 DIAGNOSIS — M54.17 LUMBOSACRAL RADICULOPATHY: ICD-10-CM

## 2017-12-21 DIAGNOSIS — M51.36 DDD (DEGENERATIVE DISC DISEASE), LUMBAR: ICD-10-CM

## 2017-12-21 DIAGNOSIS — M47.812 DJD (DEGENERATIVE JOINT DISEASE), CERVICAL: ICD-10-CM

## 2017-12-21 DIAGNOSIS — M48.02 CERVICAL STENOSIS OF SPINAL CANAL: ICD-10-CM

## 2017-12-21 DIAGNOSIS — S32.020D CLOSED COMPRESSION FRACTURE OF L2 LUMBAR VERTEBRA, WITH ROUTINE HEALING, SUBSEQUENT ENCOUNTER: ICD-10-CM

## 2017-12-21 DIAGNOSIS — G89.4 CHRONIC PAIN SYNDROME: ICD-10-CM

## 2017-12-21 DIAGNOSIS — M54.12 CERVICAL RADICULOPATHY: ICD-10-CM

## 2017-12-21 DIAGNOSIS — M47.26 OSTEOARTHRITIS OF SPINE WITH RADICULOPATHY, LUMBAR REGION: ICD-10-CM

## 2017-12-21 DIAGNOSIS — Z79.891 ENCOUNTER FOR LONG-TERM OPIATE ANALGESIC USE: ICD-10-CM

## 2017-12-21 RX ORDER — FENTANYL 25 UG/1
1 PATCH TRANSDERMAL
Qty: 10 PATCH | Refills: 0 | Status: SHIPPED | OUTPATIENT
Start: 2017-12-22 | End: 2017-12-22 | Stop reason: SDUPTHER

## 2017-12-22 ENCOUNTER — OFFICE VISIT (OUTPATIENT)
Dept: PAIN MEDICINE | Facility: CLINIC | Age: 72
End: 2017-12-22
Payer: MEDICARE

## 2017-12-22 VITALS
OXYGEN SATURATION: 99 % | WEIGHT: 141.56 LBS | BODY MASS INDEX: 19.82 KG/M2 | RESPIRATION RATE: 18 BRPM | TEMPERATURE: 98 F | SYSTOLIC BLOOD PRESSURE: 105 MMHG | DIASTOLIC BLOOD PRESSURE: 76 MMHG | HEART RATE: 98 BPM | HEIGHT: 71 IN

## 2017-12-22 DIAGNOSIS — F33.0 MDD (MAJOR DEPRESSIVE DISORDER), RECURRENT EPISODE, MILD: ICD-10-CM

## 2017-12-22 DIAGNOSIS — M48.02 CERVICAL STENOSIS OF SPINAL CANAL: ICD-10-CM

## 2017-12-22 DIAGNOSIS — M51.36 DDD (DEGENERATIVE DISC DISEASE), LUMBAR: ICD-10-CM

## 2017-12-22 DIAGNOSIS — S32.020D CLOSED COMPRESSION FRACTURE OF L2 LUMBAR VERTEBRA, WITH ROUTINE HEALING, SUBSEQUENT ENCOUNTER: ICD-10-CM

## 2017-12-22 DIAGNOSIS — M54.17 LUMBOSACRAL RADICULOPATHY: ICD-10-CM

## 2017-12-22 DIAGNOSIS — G89.4 CHRONIC PAIN SYNDROME: ICD-10-CM

## 2017-12-22 DIAGNOSIS — M54.12 CERVICAL RADICULOPATHY: ICD-10-CM

## 2017-12-22 DIAGNOSIS — M47.26 OSTEOARTHRITIS OF SPINE WITH RADICULOPATHY, LUMBAR REGION: ICD-10-CM

## 2017-12-22 DIAGNOSIS — M47.812 DJD (DEGENERATIVE JOINT DISEASE), CERVICAL: ICD-10-CM

## 2017-12-22 DIAGNOSIS — Z79.891 ENCOUNTER FOR LONG-TERM OPIATE ANALGESIC USE: ICD-10-CM

## 2017-12-22 DIAGNOSIS — M50.30 DDD (DEGENERATIVE DISC DISEASE), CERVICAL: ICD-10-CM

## 2017-12-22 DIAGNOSIS — Z85.89 HISTORY OF HEAD AND NECK CANCER: ICD-10-CM

## 2017-12-22 DIAGNOSIS — G89.4 CHRONIC PAIN SYNDROME: Primary | ICD-10-CM

## 2017-12-22 PROCEDURE — 99999 PR PBB SHADOW E&M-EST. PATIENT-LVL IV: CPT | Mod: PBBFAC,,, | Performed by: NURSE PRACTITIONER

## 2017-12-22 PROCEDURE — 99214 OFFICE O/P EST MOD 30 MIN: CPT | Mod: S$PBB,,, | Performed by: NURSE PRACTITIONER

## 2017-12-22 PROCEDURE — 99499 UNLISTED E&M SERVICE: CPT | Mod: S$PBB,,, | Performed by: NURSE PRACTITIONER

## 2017-12-22 PROCEDURE — 99214 OFFICE O/P EST MOD 30 MIN: CPT | Mod: PBBFAC | Performed by: NURSE PRACTITIONER

## 2017-12-22 RX ORDER — FENTANYL 25 UG/1
1 PATCH TRANSDERMAL
Qty: 10 PATCH | Refills: 0 | Status: SHIPPED | OUTPATIENT
Start: 2017-12-22 | End: 2018-01-21

## 2017-12-22 NOTE — PROGRESS NOTES
Chronic Pain - Follow Up     Referring Physician: Ayden Almonte MD     Chief Complaint   Patient presents with    Back Pain     one month         SUBJECTIVE: Disclaimer: This note has been generated using voice-recognition software. There may be typographical errors that have been missed during proof-reading      The patient presents today for follow up for neck and low back pain. He continues to report neck and low back pain. He reports that his low back pain is worse today. He does report pain that radiates down the side of both legs to his ankle. He continue to report benefit of his neck pain with recent VALERI. He has been recently evaluated by neurosurgeon and will be having an EMG. He reports intermittent radiating arm pain that is tolerable with his current regimen. He reports that the Xytampza is very expensive. He reports that the copay is $200 a month. He reports that the cost is too much for him. He continues to take Fentanyl and Xtampza with benefit. He denies any adverse effects. He does take the Xtampza with pudding as a previous barium swallow proved pills becoming lodged in the vallecula. 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 He does report that he is out of Xanax. He denies any other health changes. He denies any bowel or bladder incontinence. His pain today is 9/10.       Pain Medications:  Current:  Xytampza   Fentanyl   Gabapentin  Soma- Dr. Alfonso     Tried in Past:  NSAIDs - Celebrex and Ibuprofen  TCA - Yes  SNRI - Yes  Anti-convulsants -Gabapentin and Lyrica  Muscle Relaxants - Soma  Opioids- oxycodone     Physical Therapy/Home Exercise: no       report: Reviewed and consistent with medication use as prescribed.     Pain Procedures:   ESIs in the past (1994)  8/7/17 T1-T2 IL VALERI- 50% relief for one week  9/14/2017- L4-5 IL VALERI- no relief  10/2/2017- T1-2 IL VALERI     Chiropractor -never  Acupuncture - never  TENS unit -never  Spinal  decompression -Cervical fusion  Joint replacement -never    Imaging:   MRI Cervical Spine 1/19/2017:  Stable anterior and interbody fusion of C5-6. 2 mm anterolisthesis at C4 which were not present on the prior study. Spondylosis at multiple cervical levels which has worsened since the prior study in 2012.     C2-3: A posterior disc protrusion is present causing moderate spinal stenosis, effacement of the anterior thecal sac and flattening of the anterior cervical cord.     C3-4: Posterior disc protrusion which causes moderate spinal stenosis, effacement of the anterior thecal sac, and flattening of the anterior cervical cord.     C4-5: Posterior disc osteophyte complex which has worsened significantly since the prior study and now causes moderate spinal stenosis, effacement of the anterior thecal sac, and flattening of the anterior cervical cord.    C6-7: Posterior disc osteophyte complex is stable when compared to the prior study. Thickening of the ligamentum flavum has worsened. Moderate spinal stenosis with effacement of the anterior thecal sac. Moderate to severe right-sided neural foraminal stenosis with suspected impingement of the exiting nerve root.       MRI Thoracic Spine 1/19/2017:  An exaggerated thoracic kyphosis. Old superior endplate compression fracture to the T4 vertebral body. Superior endplate Schmorl's nodes are present at T1 and T2. Disc bulge at T3-4 which does not cause significant spinal or neural foraminal stenosis. Thick potentially calcified pleural plaques are present bilaterally along with airspace disease, atelectasis, and pulmonary nodularity. Dedicated imaging of the chest is recommended if this has not already been done.     MRI Lumbar Spine 1/19/2017:  A superior endplate compression fracture is present to the L2 vertebral body which has healed with a residual 30% loss of height. 2 mm retrolisthesis at L2.     L1-2: Annular bulge without spinal or neural foraminal stenosis.      L2-3 Annular bulge with mild spinal stenosis and no neural foraminal stenosis.    L3-4: Annular bulge with mild spinal stenosis and mild bilateral neural foraminal stenosis.     L4-5: Annular bulge and small posterior annular tear seen on image 11 of series 3. An annular defect is present to the anterior right lateral portion of the disc and was not seen on the prior study. Mild spinal stenosis and moderate bilateral neural foraminal stenosis.     L5-S1: Annular bulge with no spinal stenosis and mild left-sided neural foraminal stenosis.       CMP  Sodium   Date Value Ref Range Status   04/18/2017 142 136 - 145 mmol/L Final     Potassium   Date Value Ref Range Status   04/18/2017 3.6 3.5 - 5.1 mmol/L Final     Chloride   Date Value Ref Range Status   04/18/2017 98 95 - 110 mmol/L Final     CO2   Date Value Ref Range Status   04/18/2017 32 (H) 23 - 29 mmol/L Final     Glucose   Date Value Ref Range Status   04/18/2017 84 70 - 110 mg/dL Final     BUN, Bld   Date Value Ref Range Status   04/18/2017 13 8 - 23 mg/dL Final     Creatinine   Date Value Ref Range Status   04/18/2017 0.8 0.5 - 1.4 mg/dL Final     Calcium   Date Value Ref Range Status   04/18/2017 9.2 8.7 - 10.5 mg/dL Final     Total Protein   Date Value Ref Range Status   04/18/2017 7.5 6.0 - 8.4 g/dL Final     Albumin   Date Value Ref Range Status   04/18/2017 3.6 3.5 - 5.2 g/dL Final     Total Bilirubin   Date Value Ref Range Status   04/18/2017 0.3 0.1 - 1.0 mg/dL Final     Comment:     For infants and newborns, interpretation of results should be based  on gestational age, weight and in agreement with clinical  observations.  Premature Infant recommended reference ranges:  Up to 24 hours.............<8.0 mg/dL  Up to 48 hours............<12.0 mg/dL  3-5 days..................<15.0 mg/dL  6-29 days.................<15.0 mg/dL       Alkaline Phosphatase   Date Value Ref Range Status   04/18/2017 120 55 - 135 U/L Final     AST   Date Value Ref Range  "Status   04/18/2017 26 10 - 40 U/L Final     ALT   Date Value Ref Range Status   04/18/2017 19 10 - 44 U/L Final     Anion Gap   Date Value Ref Range Status   04/18/2017 12 8 - 16 mmol/L Final     eGFR if    Date Value Ref Range Status   04/18/2017 >60.0 >60 mL/min/1.73 m^2 Final     eGFR if non    Date Value Ref Range Status   04/18/2017 >60.0 >60 mL/min/1.73 m^2 Final     Comment:     Calculation used to obtain the estimated glomerular filtration  rate (eGFR) is the CKD-EPI equation. Since race is unknown   in our information system, the eGFR values for   -American and Non--American patients are given   for each creatinine result.            REVIEW OF SYSTEMS:     GENERAL:  Weight loss and decreased appetite  HEENT:  No recent changes in vision or hearing  NECK:  difficulty with swallowing.  RESPIRATORY: Negative for cough, wheezing or shortness of breath, patient denies any recent URI.  CARDIOVASCULAR: Negative for chest pain, leg swelling or palpitations.  GI: Negative for abdominal discomfort, blood in stools or black stools or change in bowel habits.  MUSCULOSKELETAL: See HPI.  SKIN: Negative for lesions, rash, and itching.  PSYCH:  Depression and anxiety treated with citalopram, Wellbutrin, alprazolam.  Patient's sleep is disturbed secondary to pain.  HEMATOLOGY/LYMPHOLOGY: Negative for prolonged bleeding, bruising easily or swollen nodes.   ENDO: No history of diabetes or thyroid dysfunction  NEURO: No history of headaches, syncope, paralysis, seizures or tremors.  All other reviewed and negative other than HPI.     OBJECTIVE:    /76   Pulse 98   Temp 97.9 °F (36.6 °C) (Oral)   Resp 18   Ht 5' 11" (1.803 m)   Wt 64.2 kg (141 lb 8.6 oz)   SpO2 99%   BMI 19.74 kg/m²     PHYSICAL EXAMINATION:    GENERAL: Well appearing, in no acute distress, alert and oriented x3.  PSYCH:  Mood and affect appropriate.  SKIN: Skin color, texture, turgor normal, no rashes " or lesions.  HEAD/FACE:  Normocephalic, atraumatic. Cranial nerves grossly intact.  NECK: Pain to palpation over the cervical paraspinous muscles. Spurling positive on right side. Limited ROM with pain on extension, and lateral flexion. Positive facet loading bilaterally.  CV: RRR with palpation of the radial artery.  PULM: No evidence of respiratory difficulty, symmetric chest rise.  BACK: Straight leg raise in the supine position is positive for radicular pain bilaterally. There is pain with palpation over lumbar spine. Limited ROM with pain on flexion and extension. Positive facet loading bilaterally.   EXTREMITIES: Peripheral joint ROM is full and pain free without obvious instability or laxity in all four extremities. Trigger finger to left middle finger.  Good capillary refill.  MUSCULOSKELETAL:  No atrophy or tone abnormalities are noted.  NEURO: Bilateral upper extremity coordination and muscle stretch reflexes are physiologic and symmetric.  Darren's negative. No clonus.  No loss of sensation is noted.  GAIT: Antalgic, ambulates without assistance      ASSESSMENT: 70 y.o. year old male with pain, consistent with      Encounter Diagnoses   Name Primary?    Chronic pain syndrome Yes    Osteoarthritis of spine with radiculopathy, lumbar region     Lumbosacral radiculopathy     DDD (degenerative disc disease), lumbar     Closed compression fracture of L2 lumbar vertebra, with routine healing, subsequent encounter     Cervical radiculopathy     Cervical stenosis of spinal canal     DDD (degenerative disc disease), cervical     History of head and neck cancer     Encounter for long-term opiate analgesic use     MDD (major depressive disorder), recurrent episode, mild       PLAN:     - Previous imaging was reviewed and discussed with the patient today. Previous CMP reviewed.     - Schedule for L4-5 IL VALERI.   The procedure, risks, benefits and options were discussed with patient. There are no  contraindications to the procedure. The patient expressed understanding and agreed to proceed.  Consent obtained today.    - He is s/p T1-2 IL VALERI with benefit. We can repeat this in the future.     - Continue Gabapentin 300 mg TID.     - Fentanyl 25 mcg/hr patch every 72 hrs, #10, 0 refills.     - Xtampza 36 mg every 12 hours, #60, 0 refills. He may open the capsules and sprinkle the powder into pudding secondary to impaired swallowing.    - The patient is here today for a refill of current pain medications and they believe these provide effective pain control and improvements in quality of life.  The patient notes no serious side effects, and feels the benefits outweigh the risks.  The patient was reminded of the pain contract that they signed previously as well as the risks and benefits of the medication including possible death.  The updated [place] Board of [employer] prescription monitoring program was reviewed, and the patient has been found to be compliant with current treatment plan. Medication management provided by Dr. Mcmillan, in absence of Dr. Dietz.     - UDS from 11/28/2017 reviewed and consistant..     - Can continue Soma from Dr. Alfonso.    - Consult psychiatry since Dr. Browne is no longer practicing at [facility].     - RTC in 1 month.     - The patient will continue a home exercise routine to help with pain and strengthening.      The above plan and management options were discussed at length with patient. Patient is in agreement with the above and verbalized understanding.     Janis Loera NP  12/22/2017

## 2017-12-22 NOTE — TELEPHONE ENCOUNTER
Dr. Dietz patient, in clinic with Janis Loera NP today. Dr. Dietz is out on Friday's. Patient is seen every month, last UDS on 11/28/2017, consistent with current therapy. Patient does have a pain contract with our office.  shows medication last filled on 11/25/2017. Patient does have a pain contract with our office.

## 2017-12-26 ENCOUNTER — OFFICE VISIT (OUTPATIENT)
Dept: CARDIOLOGY | Facility: CLINIC | Age: 72
End: 2017-12-26
Attending: INTERNAL MEDICINE
Payer: MEDICARE

## 2017-12-26 VITALS
DIASTOLIC BLOOD PRESSURE: 69 MMHG | HEIGHT: 71 IN | SYSTOLIC BLOOD PRESSURE: 139 MMHG | BODY MASS INDEX: 20.44 KG/M2 | HEART RATE: 60 BPM | WEIGHT: 146 LBS

## 2017-12-26 DIAGNOSIS — R13.19 OTHER DYSPHAGIA: ICD-10-CM

## 2017-12-26 DIAGNOSIS — F17.200 CURRENT SMOKER: ICD-10-CM

## 2017-12-26 DIAGNOSIS — I25.2 HISTORY OF MYOCARDIAL INFARCTION: ICD-10-CM

## 2017-12-26 DIAGNOSIS — M25.512 CHRONIC PAIN OF BOTH SHOULDERS: ICD-10-CM

## 2017-12-26 DIAGNOSIS — M25.511 CHRONIC PAIN OF BOTH SHOULDERS: ICD-10-CM

## 2017-12-26 DIAGNOSIS — Z85.89 HISTORY OF HEAD AND NECK CANCER: ICD-10-CM

## 2017-12-26 DIAGNOSIS — I25.10 CORONARY ARTERY DISEASE INVOLVING NATIVE CORONARY ARTERY OF NATIVE HEART WITHOUT ANGINA PECTORIS: ICD-10-CM

## 2017-12-26 DIAGNOSIS — G89.29 CHRONIC PAIN OF BOTH SHOULDERS: ICD-10-CM

## 2017-12-26 DIAGNOSIS — I50.22 HEART FAILURE, SYSTOLIC, CHRONIC: ICD-10-CM

## 2017-12-26 DIAGNOSIS — Z95.5 HISTORY OF CORONARY ARTERY STENT PLACEMENT: ICD-10-CM

## 2017-12-26 PROCEDURE — 99406 BEHAV CHNG SMOKING 3-10 MIN: CPT | Mod: S$GLB,,, | Performed by: INTERNAL MEDICINE

## 2017-12-26 PROCEDURE — 99214 OFFICE O/P EST MOD 30 MIN: CPT | Mod: 25,S$GLB,, | Performed by: INTERNAL MEDICINE

## 2017-12-26 RX ORDER — ATORVASTATIN CALCIUM 20 MG/1
20 TABLET, FILM COATED ORAL DAILY
Qty: 90 TABLET | Refills: 3 | Status: SHIPPED | OUTPATIENT
Start: 2017-12-26 | End: 2018-04-03 | Stop reason: SDUPTHER

## 2017-12-26 RX ORDER — ASPIRIN/CALCIUM CARB/MAGNESIUM 325 MG
4 TABLET ORAL
Qty: 108 LOZENGE | Refills: 3 | Status: SHIPPED | OUTPATIENT
Start: 2017-12-26 | End: 2018-05-25

## 2017-12-26 RX ORDER — BENAZEPRIL HYDROCHLORIDE 5 MG/1
5 TABLET ORAL DAILY
Qty: 90 TABLET | Refills: 3 | Status: SHIPPED | OUTPATIENT
Start: 2017-12-26 | End: 2018-06-12

## 2017-12-26 RX ORDER — IBUPROFEN 200 MG
1 TABLET ORAL DAILY
Qty: 30 PATCH | Refills: 3 | Status: SHIPPED | OUTPATIENT
Start: 2017-12-26 | End: 2018-05-25

## 2017-12-26 RX ORDER — ASPIRIN 81 MG/1
81 TABLET ORAL DAILY
Qty: 90 TABLET | Refills: 3 | COMMUNITY
Start: 2017-12-26 | End: 2018-12-26

## 2017-12-26 RX ORDER — FUROSEMIDE 20 MG/1
20 TABLET ORAL DAILY
Qty: 90 TABLET | Refills: 3 | Status: SHIPPED | OUTPATIENT
Start: 2017-12-26 | End: 2018-12-26

## 2017-12-26 NOTE — PROGRESS NOTES
Subjective:     Griffin Hall is a 72 y.o. male patient who is a heavy smoker with a history of surgery for cancer of the neck in 2005. On 6/13/2016 he began having aching in his left shoulder and over his chest. The pain came and went and got more pronounced. He had given himself 4 days for it to ease up but as it persisted he decided to seek care at the ER of [facility] [place]. He ruled in for a NSTEMI and underwent cardiac catheterization on 6/17/2016 that revealed the mid left anterior descending coronary artery to have a mid 90% lesion. The left ventriculogram revealed a distal anteroseptal and apical akinesia. The mid LAD was stented with a NINA 3.0 x 18 mm. Following the procedure he had persitent aching in the left shoulder but no chest pain. On 8/1/2016 he had an Echo that revealed normal left ventricular size with low normal systolic function with an EF in the 50-55% range. The LA was mildly dilated. No exertional chest pain but some exertional dyspnea. No palpitations or weak spells. Feeling fair overall.      Coronary Artery Disease   Presents for follow-up visit. Symptoms include dizziness, leg swelling and shortness of breath. Pertinent negatives include no chest pain, chest pressure, chest tightness, muscle weakness, palpitations or weight gain. His past medical history is significant for CHF. The symptoms have been stable.   Congestive Heart Failure   Presents for follow-up visit. Associated symptoms include edema and shortness of breath. Pertinent negatives include no abdominal pain, chest pain, chest pressure, claudication, fatigue, muscle weakness, near-syncope, nocturia, orthopnea, palpitations, paroxysmal nocturnal dyspnea or unexpected weight change. The symptoms have been stable. His past medical history is significant for CAD.       Review of Systems   Constitution: Negative for diaphoresis, fatigue, fever, unexpected weight change and weight gain.   HENT: Negative for nosebleeds.    Eyes: Negative for  pain, vision loss in left eye and vision loss in right eye.   Cardiovascular: Positive for dyspnea on exertion and leg swelling. Negative for chest pain, claudication, irregular heartbeat, near-syncope, orthopnea, palpitations, paroxysmal nocturnal dyspnea and syncope.   Respiratory: Positive for shortness of breath. Negative for chest tightness, cough, hemoptysis and wheezing.    Endocrine: Negative for cold intolerance and heat intolerance.   Hematologic/Lymphatic: Negative for bleeding problem. Does not bruise/bleed easily.   Skin: Negative for dry skin and rash.   Musculoskeletal: Positive for arthritis, back pain and joint pain (left shoulder). Negative for falls and muscle weakness.   Gastrointestinal: Positive for dysphagia. Negative for abdominal pain, hematemesis, hematochezia, hemorrhoids, jaundice, melena, nausea and vomiting.   Genitourinary: Negative for hematuria, missed menses and nocturia.   Neurological: Positive for dizziness. Negative for focal weakness, headaches, light-headedness, loss of balance, numbness and vertigo.   Psychiatric/Behavioral: Negative for altered mental status, depression and memory loss. The patient is not nervous/anxious.    Allergic/Immunologic: Negative for hives and persistent infections.       Current Outpatient Prescriptions on File Prior to Visit   Medication Sig Dispense Refill    albuterol (VENTOLIN HFA) 90 mcg/actuation inhaler Inhale 2 puffs into the lungs every 6 (six) hours as needed for Wheezing. 1 Inhaler 6    ALPRAZolam (XANAX) 2 MG Tab Take 1 tablet (2 mg total) by mouth 2 (two) times daily as needed. 60 tablet 0    aspirin (ECOTRIN) 81 MG EC tablet Take 1 tablet (81 mg total) by mouth once daily. 90 tablet 3    atorvastatin (LIPITOR) 20 MG tablet TAKE 1 TABLET BY MOUTH EVERY DAY 90 tablet 3    benazepril (LOTENSIN) 5 MG tablet TAKE 1 TABLET BY MOUTH EVERY DAY 90 tablet 3    carisoprodol (SOMA) 350 MG tablet   0    celecoxib (CELEBREX) 200 MG capsule    "0    dextroamphetamine-amphetamine 10 mg Tab once daily.  0    DUREZOL 0.05 % Drop ophthalmic solution       fentaNYL (DURAGESIC) 25 mcg/hr Place 1 patch onto the skin every 72 hours. 10 patch 0    fluticasone (FLONASE) 50 mcg/actuation nasal spray       furosemide (LASIX) 20 MG tablet Take 1 tablet (20 mg total) by mouth daily as needed. 30 tablet 3    ILEVRO 0.3 % DrpS       metoprolol succinate (TOPROL-XL) 50 MG 24 hr tablet TAKE 1 TABLET BY MOUTH EVERY DAY 90 tablet 3    nicotine (NICODERM CQ) 21 mg/24 hr Place 1 patch onto the skin once daily. 15 patch 3    nicotine polacrilex 4 MG Lozg Take 1 lozenge (4 mg total) by mouth as needed. 108 lozenge 3    oxyCODONE myristate (XTAMPZA ER) 36 mg CSpT Take 36 mg by mouth 2 (two) times daily. 60 each 0    gabapentin (NEURONTIN) 300 MG capsule Take 1 capsule (300 mg total) by mouth 3 (three) times daily. 90 capsule 6     No current facility-administered medications on file prior to visit.        /69   Pulse 60   Ht 5' 11" (1.803 m)   Wt 66.2 kg (146 lb)   BMI 20.36 kg/m²        Objective:     Physical Exam   Constitutional: He is oriented to person, place, and time. He appears well-developed and well-nourished. He does not have a sickly appearance. He appears ill. No distress.   HENT:   Head: Normocephalic and atraumatic.   Nose: Nose normal.   Eyes: Right eye exhibits no discharge. Left eye exhibits no discharge. Right conjunctiva is not injected. Left conjunctiva is not injected. Right pupil is round. Left pupil is round. Pupils are equal.   Neck: No JVD present. No tracheal tenderness present. Carotid bruit is not present.   Extensive scarring right neck.   Cardiovascular: Normal rate, regular rhythm, S1 normal and S2 normal.   No extrasystoles are present. PMI is not displaced.  Exam reveals gallop and S4.    No murmur heard.  Pulses:       Radial pulses are 2+ on the right side, and 2+ on the left side.        Dorsalis pedis pulses are 1+ on the " right side, and 1+ on the left side.        Posterior tibial pulses are 1+ on the right side, and 1+ on the left side.   Pulmonary/Chest: Effort normal and breath sounds normal.   Abdominal: Soft. Normal appearance. There is no hepatosplenomegaly. There is no tenderness.   Musculoskeletal:        Right ankle: He exhibits swelling. He exhibits no ecchymosis and no deformity.        Left ankle: He exhibits swelling. He exhibits no ecchymosis and no deformity.   Neurological: He is alert and oriented to person, place, and time. He is not disoriented. No cranial nerve deficit or sensory deficit.   Skin: Skin is warm, dry and intact. No rash noted. He is not diaphoretic. No cyanosis. Nails show no clubbing.   Psychiatric: He has a normal mood and affect. His speech is normal and behavior is normal. Judgment and thought content normal. Cognition and memory are normal.       Assessment:     1. Coronary artery disease involving native coronary artery of native heart without angina pectoris    2. History of myocardial infarction    3. History of coronary artery stent placement    4. Heart failure, systolic, chronic    5. Current smoker    6. History of head and neck cancer    7. Chronic pain of both shoulders    8. Other dysphagia        Plan:      1. Coronary Artery Disease   6/16/2016: NSTEMI. Troponin 0.3.   Anterior T wave inversion.   6/17/2016: [facility] [place]: Cath: LAD: mid 90%. LV: distal anteroseptal and apical akinesia. NINA 3.0 x 18 mm.   6/17/2016: Chol 111. HDL 62. LDL 52. TG 62.   Atorvastatin 20 mg Q24.     Aspirin 81 mg Q24 indefinitely.   Stable.     2. Heart Failure, Systolic, Chronic   6/17/2016: [facility] [place]: Cath: LV: distal anteroseptal and apical akinesia. EF 35%.   6/17/2016: Echo: Left ventricular size upper normal with moderate systolic dysfunction. EF 35-40%. Anteroseptal/apical WMA.   8/1/2016: Echo: Normal left ventricular size with low normal systolic function. EF 50-55%. Mildly dilated LA.   Probably stunning as  troponin so low.   On metoprolol 50 mg Q24, benazepril 5 mg Q24 and furosemide 20 mg Q24 PRN.   Change furosemide to 20 mg Q24.    3. Current Smoker   1967: Began smoking.   Unable to quit.   Been heavy smoker.   Encouraged to quit.   Rx patches and gums through [facility].   Discussed over 8 minutes.     4. History of Neck Cancer   2005: Radical neck surgery.     5. Dysphagia   Mild after radical neck dissection.    6. Shoulder Pain   Appears to have musculoskeletal cause.    7. Primary Care   In transition.    F/u 3 months.    Garcia Mendez M.D.

## 2018-01-02 ENCOUNTER — TELEPHONE (OUTPATIENT)
Dept: NEUROLOGY | Facility: CLINIC | Age: 73
End: 2018-01-02

## 2018-01-08 ENCOUNTER — TELEPHONE (OUTPATIENT)
Dept: NEUROLOGY | Facility: CLINIC | Age: 73
End: 2018-01-08

## 2018-01-08 NOTE — TELEPHONE ENCOUNTER
Spoke to pt who confirmed second change in his EMG appt. He asked for me to send a letter in the mail.

## 2018-01-16 ENCOUNTER — OFFICE VISIT (OUTPATIENT)
Dept: FAMILY MEDICINE | Facility: CLINIC | Age: 73
End: 2018-01-16
Attending: FAMILY MEDICINE
Payer: MEDICARE

## 2018-01-16 VITALS
SYSTOLIC BLOOD PRESSURE: 108 MMHG | DIASTOLIC BLOOD PRESSURE: 60 MMHG | OXYGEN SATURATION: 100 % | WEIGHT: 144.19 LBS | BODY MASS INDEX: 21.36 KG/M2 | HEART RATE: 60 BPM | HEIGHT: 69 IN

## 2018-01-16 DIAGNOSIS — I50.22 HEART FAILURE, SYSTOLIC, CHRONIC: ICD-10-CM

## 2018-01-16 DIAGNOSIS — Z11.59 NEED FOR HEPATITIS C SCREENING TEST: ICD-10-CM

## 2018-01-16 DIAGNOSIS — F11.20 OPIOID TYPE DEPENDENCE, CONTINUOUS USE: ICD-10-CM

## 2018-01-16 DIAGNOSIS — G89.4 CHRONIC PAIN SYNDROME: ICD-10-CM

## 2018-01-16 DIAGNOSIS — I25.10 OCCLUSIVE CORONARY ARTERY DISEASE: ICD-10-CM

## 2018-01-16 DIAGNOSIS — M47.27 OSTEOARTHRITIS OF LUMBOSACRAL SPINE WITH RADICULOPATHY: ICD-10-CM

## 2018-01-16 DIAGNOSIS — M48.02 CERVICAL STENOSIS OF SPINAL CANAL: ICD-10-CM

## 2018-01-16 DIAGNOSIS — Z12.11 SCREEN FOR COLON CANCER: ICD-10-CM

## 2018-01-16 DIAGNOSIS — J98.8 OTHER SPECIFIED RESPIRATORY DISORDERS: ICD-10-CM

## 2018-01-16 DIAGNOSIS — F17.200 HEAVY SMOKER: ICD-10-CM

## 2018-01-16 DIAGNOSIS — F41.9 ANXIETY: ICD-10-CM

## 2018-01-16 DIAGNOSIS — Z85.89 HISTORY OF HEAD AND NECK CANCER: Primary | ICD-10-CM

## 2018-01-16 DIAGNOSIS — R13.19 OTHER DYSPHAGIA: ICD-10-CM

## 2018-01-16 DIAGNOSIS — R09.89 ABNORMALLY LOW PEAK EXPIRATORY FLOW RATE: ICD-10-CM

## 2018-01-16 DIAGNOSIS — Z13.6 SCREENING FOR AAA (ABDOMINAL AORTIC ANEURYSM): ICD-10-CM

## 2018-01-16 PROCEDURE — 99999 PR PBB SHADOW E&M-EST. PATIENT-LVL III: CPT | Mod: PBBFAC,,, | Performed by: FAMILY MEDICINE

## 2018-01-16 PROCEDURE — 99215 OFFICE O/P EST HI 40 MIN: CPT | Mod: S$PBB,,, | Performed by: FAMILY MEDICINE

## 2018-01-16 PROCEDURE — G0009 ADMIN PNEUMOCOCCAL VACCINE: HCPCS | Mod: PBBFAC,PO

## 2018-01-16 PROCEDURE — 90662 IIV NO PRSV INCREASED AG IM: CPT | Mod: PBBFAC,PO

## 2018-01-16 PROCEDURE — 99213 OFFICE O/P EST LOW 20 MIN: CPT | Mod: PBBFAC,PO | Performed by: FAMILY MEDICINE

## 2018-01-16 RX ORDER — ALPRAZOLAM 1 MG/1
1 TABLET ORAL 3 TIMES DAILY
Qty: 90 TABLET | Refills: 0 | Status: SHIPPED | OUTPATIENT
Start: 2018-01-16 | End: 2018-02-22 | Stop reason: SDUPTHER

## 2018-01-16 RX ORDER — ALPRAZOLAM 1 MG/1
1 TABLET ORAL 3 TIMES DAILY
Refills: 0 | COMMUNITY
Start: 2017-12-28 | End: 2018-01-16

## 2018-01-16 NOTE — PROGRESS NOTES
Patient was given Influenza IM in Left Deltoid and Prevnar 13 IM in Right Deltoid as per orders from Dr. Sinha. Aseptic tech used and pt tolerated well. Pt was monitored for 15 mins with no reaction noted.

## 2018-01-16 NOTE — PROGRESS NOTES
Subjective:       Patient ID: Griffin Hall is a 72 y.o. male.    Chief Complaint: Establish Care    HPI     The patient presents today for first visit with me.  He has a very complicated medical history.  He is on chronic pain medication and benzodiazepines.  He is a heavy smoker. He has coronary artery disease, and has a history of head & neck squamous cell cancer (s/p surgery and XRT).    Patient Active Problem List   Diagnosis    Dysphagia    Opioid type dependence, continuous use    History of myocardial infarction    History of head and neck cancer    Current smoker    Occlusive coronary artery disease    History of coronary artery stent placement    Heart failure, systolic, chronic    Depression    Anxiety    Cervical stenosis of spinal canal    Cervical radiculopathy    Chronic pain    Osteoarthritis of lumbosacral spine with radiculopathy       Past Surgical History:   Procedure Laterality Date    CERVICAL FUSION N/A     HAND TENDON SURGERY      THROAT SURGERY  2005    TONSILLECTOMY         Current Outpatient Prescriptions:     albuterol (VENTOLIN HFA) 90 mcg/actuation inhaler, Inhale 2 puffs into the lungs every 6 (six) hours as needed for Wheezing., Disp: 1 Inhaler, Rfl: 6    ALPRAZolam (XANAX) 2 MG Tab, Take 1 tablet (2 mg total) by mouth 2 (two) times daily as needed. (Patient taking differently: Take 2 mg by mouth 3 (three) times daily. ), Disp: 60 tablet, Rfl: 0    aspirin (ECOTRIN) 81 MG EC tablet, Take 1 tablet (81 mg total) by mouth once daily., Disp: 90 tablet, Rfl: 3    atorvastatin (LIPITOR) 20 MG tablet, Take 1 tablet (20 mg total) by mouth once daily., Disp: 90 tablet, Rfl: 3    benazepril (LOTENSIN) 5 MG tablet, Take 1 tablet (5 mg total) by mouth once daily., Disp: 90 tablet, Rfl: 3    carisoprodol (SOMA) 350 MG tablet, , Disp: , Rfl: 0    celecoxib (CELEBREX) 200 MG capsule, , Disp: , Rfl: 0    fentaNYL (DURAGESIC) 25 mcg/hr, Place 1 patch onto the skin every 72  "hours., Disp: 10 patch, Rfl: 0    fluticasone (FLONASE) 50 mcg/actuation nasal spray, , Disp: , Rfl:     furosemide (LASIX) 20 MG tablet, Take 1 tablet (20 mg total) by mouth once daily., Disp: 90 tablet, Rfl: 3    metoprolol succinate (TOPROL-XL) 50 MG 24 hr tablet, TAKE 1 TABLET BY MOUTH EVERY DAY, Disp: 90 tablet, Rfl: 3    nicotine (NICODERM CQ) 21 mg/24 hr, Place 1 patch onto the skin once daily., Disp: 30 patch, Rfl: 3    nicotine polacrilex 4 MG Lozg, Take 1 lozenge (4 mg total) by mouth as needed., Disp: 108 lozenge, Rfl: 3    oxyCODONE myristate (XTAMPZA ER) 36 mg CSpT, Take 36 mg by mouth 2 (two) times daily., Disp: 60 each, Rfl: 0    diphth,pertus,acell,,tetanus (BOOSTRIX) 2.5-8-5 Lf-mcg-Lf/0.5mL Susp, Inject 0.5 mLs into the muscle once., Disp: 0.5 mL, Rfl: 0    gabapentin (NEURONTIN) 300 MG capsule, Take 1 capsule (300 mg total) by mouth 3 (three) times daily., Disp: 90 capsule, Rfl: 6    zoster vaccine live, PF, (ZOSTAVAX, PF,) 19,400 unit/0.65 mL injection, Inject 19,400 Units into the skin once., Disp: 1 vial, Rfl: 0      He has been "out" of alprazolam for the past few months.  He presented to the [place] urgent care center with a "panic attack" 3 weeks ago.      Review of patient's allergies indicates:   Allergen Reactions    Codeine Itching       Family History   Problem Relation Age of Onset    Stomach cancer Mother     Lung cancer Father     Cancer Maternal Aunt     Cancer Maternal Uncle     Cancer Paternal Uncle     Dementia Sister     Testicular cancer Brother        Social History     Social History    Marital status:      Spouse name: N/A    Number of children: 4    Years of education: N/A     Occupational History    retired restaurant owner      Social History Main Topics    Smoking status: Current Every Day Smoker     Packs/day: 2.00     Years: 50.00     Types: Cigarettes     Start date: 3/15/1967    Smokeless tobacco: Never Used      Comment: 7/2016 1ppd, " down from 2ppd    Alcohol use No    Drug use: No    Sexual activity: Yes     Partners: Male     Other Topics Concern    Not on file     Social History Narrative    The patient does exercise regularly (walks QOD).      He is not satisfied with weight.    Rates diet as fair.    He does not drink at least 1/2 gallon water daily.    He drinks 6 coffee/tea/caffeine-containing soft drinks daily.    Total sleep time at night is 3-4 hours.    He does wear seat belts.    Hobbies include none.           Patient Care Team:  Primary Doctor No as PCP - General  Noel Dietz MD as Consulting Physician (Pain Medicine)  Demetria Browne MD (Psychiatry)  Garcia Mendez MD as Consulting Physician (Cardiology)  Craig Quintanilla MD as Consulting Physician (Neurosurgery)  Summer Woods MD as Consulting Physician (Otolaryngology)      Review of Systems   Constitutional: Positive for fatigue. Negative for unexpected weight change.   HENT: Negative for ear discharge, ear pain, hearing loss, tinnitus and voice change.    Eyes: Negative for pain.   Respiratory: Positive for shortness of breath. Negative for cough.    Cardiovascular: Negative for chest pain, palpitations and leg swelling.   Gastrointestinal: Negative for abdominal pain, blood in stool, constipation, diarrhea, nausea and vomiting.   Genitourinary: Negative for decreased urine volume, difficulty urinating, dysuria, enuresis, frequency, hematuria and urgency.   Musculoskeletal: Negative for arthralgias, back pain and myalgias.   Skin: Negative for rash.   Neurological: Positive for weakness and headaches. Negative for dizziness and light-headedness.   Hematological: Does not bruise/bleed easily.   Psychiatric/Behavioral: Negative for dysphoric mood and sleep disturbance. The patient is not nervous/anxious.          Objective:      Physical Exam   Constitutional: He is oriented to person, place, and time. He appears well-developed and well-nourished. He is  cooperative.   HENT:   Head: Normocephalic and atraumatic.   Right Ear: External ear normal.   Left Ear: External ear normal.   Nose: Nose normal.   Mouth/Throat: Oropharynx is clear and moist and mucous membranes are normal. No oropharyngeal exudate.   Eyes: Conjunctivae are normal. No scleral icterus.   Neck: Neck supple. No JVD present. Carotid bruit is not present. No thyromegaly present.   Cardiovascular: Normal rate, regular rhythm, normal heart sounds and normal pulses.  Exam reveals no gallop and no friction rub.    No murmur heard.  Pulmonary/Chest: No accessory muscle usage. No respiratory distress. He has decreased breath sounds. He has wheezes. He has rhonchi. He has no rales.   Abdominal: Soft. Bowel sounds are normal. He exhibits no distension and no mass. There is no splenomegaly or hepatomegaly. There is no tenderness.   Musculoskeletal: Normal range of motion. He exhibits no edema or tenderness.   Lymphadenopathy:     He has no cervical adenopathy.     He has no axillary adenopathy.   Neurological: He is alert and oriented to person, place, and time. He has normal strength and normal reflexes. No cranial nerve deficit or sensory deficit. Coordination normal.   Skin: Skin is warm and dry.   Psychiatric: He has a normal mood and affect.   Vitals reviewed.        Assessment:       1. History of head and neck cancer    2. Occlusive coronary artery disease    3. Heart failure, systolic, chronic    4. Chronic pain syndrome    5. Opioid type dependence, continuous use    6. Osteoarthritis of lumbosacral spine with radiculopathy    7. Cervical stenosis of spinal canal    8. Other dysphagia    9. Anxiety    10. Abnormally low peak expiratory flow rate    11. Need for hepatitis C screening test    12. Heavy smoker    13. Screen for colon cancer    14. Screening for AAA (abdominal aortic aneurysm)    15. Other specified respiratory disorders         Plan:       Long discussion with patient concerning his  "diagnoses and chronic medications (especially high dose benzodiazepine).  Patient understands risks, but is obviously dependent on alprazolam.  Labs (see Orders).  He is attempting to quit smoking (on nicotine patches/lozenges).  Flu vaccine today.  Restart alprazolam at 1mg TID.      Orders Placed This Encounter    US Abdominal Aorta    Pneumococcal Conjugate Vaccine (13 Valent) (IM)    Hepatitis C antibody    Fecal Immunochemical Test (iFOBT)    CBC auto differential    Comprehensive metabolic panel    TSH    Lipid panel    Complete PFT with bronchodilator    diphth,pertus,acell,,tetanus (BOOSTRIX) 2.5-8-5 Lf-mcg-Lf/0.5mL Susp    zoster vaccine live, PF, (ZOSTAVAX, PF,) 19,400 unit/0.65 mL injection     We will call the patient with results & make further recommendations at that time.        "This note will not be shared with the patient."  "

## 2018-01-30 ENCOUNTER — TELEPHONE (OUTPATIENT)
Dept: PAIN MEDICINE | Facility: CLINIC | Age: 73
End: 2018-01-30

## 2018-01-30 NOTE — TELEPHONE ENCOUNTER
----- Message from Alicia Rivera sent at 1/30/2018  1:15 PM CST -----  Contact: pt  _  1st Request  _  2nd Request  _  3rd Request        Who: pt    Why: Requesting a call back in regards to needs to reschedule his epidural. Please call pt     What Number to Call Back:[phone]    When to Expect a call back: (Within 24 hours)    Please return the call at earliest convenience. Thanks!

## 2018-02-01 ENCOUNTER — TELEPHONE (OUTPATIENT)
Dept: PAIN MEDICINE | Facility: CLINIC | Age: 73
End: 2018-02-01

## 2018-02-01 NOTE — TELEPHONE ENCOUNTER
----- Message from Chastity Gaona LPN sent at 2/1/2018  1:21 PM CST -----  I rescheduled his procedure but he would like a call from you , he was confessed as to when his medication is due to be refilled.  Please contact patient.    Thank You,  Chastity

## 2018-02-07 ENCOUNTER — OFFICE VISIT (OUTPATIENT)
Dept: PAIN MEDICINE | Facility: CLINIC | Age: 73
End: 2018-02-07
Payer: MEDICARE

## 2018-02-07 VITALS
HEART RATE: 66 BPM | SYSTOLIC BLOOD PRESSURE: 115 MMHG | DIASTOLIC BLOOD PRESSURE: 66 MMHG | WEIGHT: 145.5 LBS | BODY MASS INDEX: 21.55 KG/M2 | RESPIRATION RATE: 16 BRPM | TEMPERATURE: 98 F | HEIGHT: 69 IN

## 2018-02-07 DIAGNOSIS — Z79.891 ENCOUNTER FOR LONG-TERM OPIATE ANALGESIC USE: ICD-10-CM

## 2018-02-07 DIAGNOSIS — M51.36 DDD (DEGENERATIVE DISC DISEASE), LUMBAR: ICD-10-CM

## 2018-02-07 DIAGNOSIS — Z85.89 HISTORY OF HEAD AND NECK CANCER: ICD-10-CM

## 2018-02-07 DIAGNOSIS — M48.02 CERVICAL STENOSIS OF SPINAL CANAL: ICD-10-CM

## 2018-02-07 DIAGNOSIS — M47.26 OSTEOARTHRITIS OF SPINE WITH RADICULOPATHY, LUMBAR REGION: ICD-10-CM

## 2018-02-07 DIAGNOSIS — M15.9 OSTEOARTHRITIS OF MULTIPLE JOINTS, UNSPECIFIED OSTEOARTHRITIS TYPE: ICD-10-CM

## 2018-02-07 DIAGNOSIS — G89.4 CHRONIC PAIN SYNDROME: Primary | ICD-10-CM

## 2018-02-07 DIAGNOSIS — M54.17 LUMBOSACRAL RADICULOPATHY: ICD-10-CM

## 2018-02-07 DIAGNOSIS — M54.12 CERVICAL RADICULOPATHY: ICD-10-CM

## 2018-02-07 DIAGNOSIS — M50.30 DDD (DEGENERATIVE DISC DISEASE), CERVICAL: ICD-10-CM

## 2018-02-07 DIAGNOSIS — S32.020D CLOSED COMPRESSION FRACTURE OF L2 LUMBAR VERTEBRA, WITH ROUTINE HEALING, SUBSEQUENT ENCOUNTER: ICD-10-CM

## 2018-02-07 PROCEDURE — 99214 OFFICE O/P EST MOD 30 MIN: CPT | Mod: S$PBB,,, | Performed by: NURSE PRACTITIONER

## 2018-02-07 PROCEDURE — 1159F MED LIST DOCD IN RCRD: CPT | Mod: ,,, | Performed by: NURSE PRACTITIONER

## 2018-02-07 PROCEDURE — 1125F AMNT PAIN NOTED PAIN PRSNT: CPT | Mod: ,,, | Performed by: NURSE PRACTITIONER

## 2018-02-07 PROCEDURE — 99999 PR PBB SHADOW E&M-EST. PATIENT-LVL IV: CPT | Mod: PBBFAC,,, | Performed by: NURSE PRACTITIONER

## 2018-02-07 PROCEDURE — 99214 OFFICE O/P EST MOD 30 MIN: CPT | Mod: PBBFAC | Performed by: NURSE PRACTITIONER

## 2018-02-07 RX ORDER — OXYCODONE HYDROCHLORIDE 30 MG/1
30 TABLET ORAL 3 TIMES DAILY PRN
Qty: 90 TABLET | Refills: 0 | Status: SHIPPED | OUTPATIENT
Start: 2018-02-07 | End: 2018-03-07 | Stop reason: SDUPTHER

## 2018-02-07 NOTE — PROGRESS NOTES
Chronic Pain - Follow Up     Referring Physician: Ayden Almonte MD     No chief complaint on file.        SUBJECTIVE: Disclaimer: This note has been generated using voice-recognition software. There may be typographical errors that have been missed during proof-reading      The patient presents today for follow up for neck and low back pain. He reports multiple joint pain today that is aching in nature. He reports soreness to his ankles, knees, hands and elbows. This pain is new and has been present for 3 weeks. He continues to report neck and low back pain. He does report pain that radiates down the side of both legs to his ankle. He continue to report benefit of his neck pain with recent VALERI. He has been recently evaluated by neurosurgeon and will be having an EMG. He reports intermittent radiating arm pain that is tolerable with his current regimen. He reports that the Xytampza is very expensive. He reports that the copay is $200 a month. He reports that the cost is too much for him. He continues to take Fentanyl and Xtampza with benefit. He denies any adverse effects. He does take the Xtampza with pudding as a previous barium swallow proved pills becoming lodged in the vallecula. 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 He does report that he is out of Xanax. He denies any other health changes. He denies any bowel or bladder incontinence. His pain today is 10/10.       Pain Medications:  Current:  Xytampza   Fentanyl   Gabapentin  Soma- Dr. Alfonso     Tried in Past:  NSAIDs - Celebrex and Ibuprofen  TCA - Yes  SNRI - Yes  Anti-convulsants -Gabapentin and Lyrica  Muscle Relaxants - Soma  Opioids- oxycodone     Physical Therapy/Home Exercise: no       report: Reviewed and consistent with medication use as prescribed.     Pain Procedures:   ESIs in the past (1994)  8/7/17 T1-T2 IL VALERI- 50% relief for one week  9/14/2017- L4-5 IL VALERI- no relief  10/2/2017-  T1-2 IL VALERI     Chiropractor -never  Acupuncture - never  TENS unit -never  Spinal decompression -Cervical fusion  Joint replacement -never    Imaging:   MRI Cervical Spine 1/19/2017:  Stable anterior and interbody fusion of C5-6. 2 mm anterolisthesis at C4 which were not present on the prior study. Spondylosis at multiple cervical levels which has worsened since the prior study in 2012.     C2-3: A posterior disc protrusion is present causing moderate spinal stenosis, effacement of the anterior thecal sac and flattening of the anterior cervical cord.     C3-4: Posterior disc protrusion which causes moderate spinal stenosis, effacement of the anterior thecal sac, and flattening of the anterior cervical cord.     C4-5: Posterior disc osteophyte complex which has worsened significantly since the prior study and now causes moderate spinal stenosis, effacement of the anterior thecal sac, and flattening of the anterior cervical cord.    C6-7: Posterior disc osteophyte complex is stable when compared to the prior study. Thickening of the ligamentum flavum has worsened. Moderate spinal stenosis with effacement of the anterior thecal sac. Moderate to severe right-sided neural foraminal stenosis with suspected impingement of the exiting nerve root.       MRI Thoracic Spine 1/19/2017:  An exaggerated thoracic kyphosis. Old superior endplate compression fracture to the T4 vertebral body. Superior endplate Schmorl's nodes are present at T1 and T2. Disc bulge at T3-4 which does not cause significant spinal or neural foraminal stenosis. Thick potentially calcified pleural plaques are present bilaterally along with airspace disease, atelectasis, and pulmonary nodularity. Dedicated imaging of the chest is recommended if this has not already been done.     MRI Lumbar Spine 1/19/2017:  A superior endplate compression fracture is present to the L2 vertebral body which has healed with a residual 30% loss of height. 2 mm retrolisthesis  at L2.     L1-2: Annular bulge without spinal or neural foraminal stenosis.     L2-3 Annular bulge with mild spinal stenosis and no neural foraminal stenosis.    L3-4: Annular bulge with mild spinal stenosis and mild bilateral neural foraminal stenosis.     L4-5: Annular bulge and small posterior annular tear seen on image 11 of series 3. An annular defect is present to the anterior right lateral portion of the disc and was not seen on the prior study. Mild spinal stenosis and moderate bilateral neural foraminal stenosis.     L5-S1: Annular bulge with no spinal stenosis and mild left-sided neural foraminal stenosis.       CMP  Sodium   Date Value Ref Range Status   04/18/2017 142 136 - 145 mmol/L Final     Potassium   Date Value Ref Range Status   04/18/2017 3.6 3.5 - 5.1 mmol/L Final     Chloride   Date Value Ref Range Status   04/18/2017 98 95 - 110 mmol/L Final     CO2   Date Value Ref Range Status   04/18/2017 32 (H) 23 - 29 mmol/L Final     Glucose   Date Value Ref Range Status   04/18/2017 84 70 - 110 mg/dL Final     BUN, Bld   Date Value Ref Range Status   04/18/2017 13 8 - 23 mg/dL Final     Creatinine   Date Value Ref Range Status   04/18/2017 0.8 0.5 - 1.4 mg/dL Final     Calcium   Date Value Ref Range Status   04/18/2017 9.2 8.7 - 10.5 mg/dL Final     Total Protein   Date Value Ref Range Status   04/18/2017 7.5 6.0 - 8.4 g/dL Final     Albumin   Date Value Ref Range Status   04/18/2017 3.6 3.5 - 5.2 g/dL Final     Total Bilirubin   Date Value Ref Range Status   04/18/2017 0.3 0.1 - 1.0 mg/dL Final     Comment:     For infants and newborns, interpretation of results should be based  on gestational age, weight and in agreement with clinical  observations.  Premature Infant recommended reference ranges:  Up to 24 hours.............<8.0 mg/dL  Up to 48 hours............<12.0 mg/dL  3-5 days..................<15.0 mg/dL  6-29 days.................<15.0 mg/dL       Alkaline Phosphatase   Date Value Ref Range  "Status   04/18/2017 120 55 - 135 U/L Final     AST   Date Value Ref Range Status   04/18/2017 26 10 - 40 U/L Final     ALT   Date Value Ref Range Status   04/18/2017 19 10 - 44 U/L Final     Anion Gap   Date Value Ref Range Status   04/18/2017 12 8 - 16 mmol/L Final     eGFR if    Date Value Ref Range Status   04/18/2017 >60.0 >60 mL/min/1.73 m^2 Final     eGFR if non    Date Value Ref Range Status   04/18/2017 >60.0 >60 mL/min/1.73 m^2 Final     Comment:     Calculation used to obtain the estimated glomerular filtration  rate (eGFR) is the CKD-EPI equation. Since race is unknown   in our information system, the eGFR values for   -American and Non--American patients are given   for each creatinine result.            REVIEW OF SYSTEMS:     GENERAL:  Weight loss and decreased appetite  HEENT:  No recent changes in vision or hearing  NECK:  difficulty with swallowing.  RESPIRATORY: Negative for cough, wheezing or shortness of breath, patient denies any recent URI.  CARDIOVASCULAR: Negative for chest pain, leg swelling or palpitations.  GI: Negative for abdominal discomfort, blood in stools or black stools or change in bowel habits.  MUSCULOSKELETAL: See HPI.  SKIN: Negative for lesions, rash, and itching.  PSYCH:  Depression and anxiety treated with citalopram, Wellbutrin, alprazolam.  Patient's sleep is disturbed secondary to pain.  HEMATOLOGY/LYMPHOLOGY: Negative for prolonged bleeding, bruising easily or swollen nodes.   ENDO: No history of diabetes or thyroid dysfunction  NEURO: No history of headaches, syncope, paralysis, seizures or tremors.  All other reviewed and negative other than HPI.     OBJECTIVE:    /66   Pulse 66   Temp 98 °F (36.7 °C) (Oral)   Resp 16   Ht 5' 9" (1.753 m)   Wt 66 kg (145 lb 8 oz)   BMI 21.49 kg/m²     PHYSICAL EXAMINATION:    GENERAL: Well appearing, in no acute distress, alert and oriented x3.  PSYCH:  Mood and affect " appropriate.  SKIN: Skin color, texture, turgor normal, no rashes or lesions.  HEAD/FACE:  Normocephalic, atraumatic. Cranial nerves grossly intact.  NECK: Pain to palpation over the cervical paraspinous muscles. Spurling positive on right side. Limited ROM with pain on extension, and lateral flexion. Positive facet loading bilaterally.  CV: RRR with palpation of the radial artery.  PULM: No evidence of respiratory difficulty, symmetric chest rise.  BACK: Straight leg raise in the supine position is positive for radicular pain bilaterally. There is pain with palpation over lumbar spine. Limited ROM with pain on flexion and extension. Positive facet loading bilaterally.   EXTREMITIES: Peripheral joint ROM is full and pain free without obvious instability or laxity in all four extremities. Trigger finger to left middle finger.  Good capillary refill. +2 pitting edema to bilateral ankles.   MUSCULOSKELETAL:  No atrophy or tone abnormalities are noted.  NEURO: Bilateral upper extremity coordination and muscle stretch reflexes are physiologic and symmetric.  Darren's negative. No clonus.  No loss of sensation is noted.  GAIT: Antalgic, ambulates without assistance      ASSESSMENT: 70 y.o. year old male with pain, consistent with      Encounter Diagnoses   Name Primary?    Chronic pain syndrome Yes    Osteoarthritis of spine with radiculopathy, lumbar region     Lumbosacral radiculopathy     Closed compression fracture of L2 lumbar vertebra, with routine healing, subsequent encounter     DDD (degenerative disc disease), lumbar     Cervical radiculopathy     Cervical stenosis of spinal canal     DDD (degenerative disc disease), cervical     History of head and neck cancer     Encounter for long-term opiate analgesic use     Osteoarthritis of multiple joints, unspecified osteoarthritis type       PLAN:     - Previous imaging was reviewed and discussed with the patient today. Previous CMP reviewed.     - He is  scheduled for L4-5 IL VALERI in 2 weeks.     - Obtain CRP, ESR, and arthritis survey to rule out possible rheumatological illness.     - Encouraged the patient to follow up with PCP and cardiology regarding pedal edema as he is on lasix.     - He is s/p T1-2 IL VALERI with benefit. We can repeat this in the future.     - Continue Gabapentin 300 mg TID.     - Discontinue Fentanyl and Xtampza.     - Oxycodone 30 mg TID PRN pain, #90, 0 refills. We discussed breaking the pills in half and taking with pudding due to impaired swallowing.     - The patient is here today for a refill of current pain medications and they believe these provide effective pain control and improvements in quality of life.  The patient notes no serious side effects, and feels the benefits outweigh the risks.  The patient was reminded of the pain contract that they signed previously as well as the risks and benefits of the medication including possible death.  The updated [place] Board [employer] prescription monitoring program was reviewed, and the patient has been found to be compliant with current treatment plan. Medication management provided by Dr. Dietz.     - UDS from 11/28/2017 reviewed and consistant. UDS next visit.     - Can continue Soma from Dr. Alfonso.    - Consult psychiatry since Dr. Browne is no longer practicing at [facility].     - RTC in 1 month.     - The patient will continue a home exercise routine to help with pain and strengthening.      The above plan and management options were discussed at length with patient. Patient is in agreement with the above and verbalized understanding.     Janis Loera NP  02/07/2018

## 2018-02-09 ENCOUNTER — TELEPHONE (OUTPATIENT)
Dept: PAIN MEDICINE | Facility: CLINIC | Age: 73
End: 2018-02-09

## 2018-02-09 NOTE — TELEPHONE ENCOUNTER
----- Message from Lisette Maguire sent at 2/9/2018  1:48 PM CST -----  _x  1st Request  _  2nd Request  _  3rd Request        Who: roseanne    Why: pt.would like to speak with norma. Please call to discuss    What Number to Call Back:[phone]    When to Expect a call back: (Before the end of the day)   -- if the call is after 12:00, the call back will be tomorrow.

## 2018-02-14 ENCOUNTER — HOSPITAL ENCOUNTER (OUTPATIENT)
Dept: RADIOLOGY | Facility: OTHER | Age: 73
Discharge: HOME | End: 2018-02-14
Attending: NURSE PRACTITIONER
Payer: MEDICARE

## 2018-02-14 DIAGNOSIS — M15.9 OSTEOARTHRITIS OF MULTIPLE JOINTS, UNSPECIFIED OSTEOARTHRITIS TYPE: ICD-10-CM

## 2018-02-14 PROCEDURE — 77077 JOINT SURVEY SINGLE VIEW: CPT | Mod: 26,,, | Performed by: INTERNAL MEDICINE

## 2018-02-14 PROCEDURE — 77077 JOINT SURVEY SINGLE VIEW: CPT | Mod: TC

## 2018-02-16 ENCOUNTER — TELEPHONE (OUTPATIENT)
Dept: PAIN MEDICINE | Facility: CLINIC | Age: 73
End: 2018-02-16

## 2018-02-19 ENCOUNTER — SURGERY (OUTPATIENT)
Age: 73
End: 2018-02-19

## 2018-02-19 ENCOUNTER — TELEPHONE (OUTPATIENT)
Dept: PAIN MEDICINE | Facility: CLINIC | Age: 73
End: 2018-02-19

## 2018-02-19 ENCOUNTER — HOSPITAL ENCOUNTER (OUTPATIENT)
Facility: OTHER | Age: 73
Discharge: HOME | End: 2018-02-19
Attending: ANESTHESIOLOGY | Admitting: ANESTHESIOLOGY
Payer: MEDICARE

## 2018-02-19 VITALS
HEART RATE: 66 BPM | RESPIRATION RATE: 18 BRPM | TEMPERATURE: 98 F | WEIGHT: 145 LBS | DIASTOLIC BLOOD PRESSURE: 63 MMHG | OXYGEN SATURATION: 95 % | HEIGHT: 71 IN | SYSTOLIC BLOOD PRESSURE: 118 MMHG | BODY MASS INDEX: 20.3 KG/M2

## 2018-02-19 DIAGNOSIS — M47.27 OSTEOARTHRITIS OF LUMBOSACRAL SPINE WITH RADICULOPATHY: Primary | ICD-10-CM

## 2018-02-19 DIAGNOSIS — G89.4 CHRONIC PAIN SYNDROME: Primary | ICD-10-CM

## 2018-02-19 DIAGNOSIS — G89.29 CHRONIC PAIN: ICD-10-CM

## 2018-02-19 DIAGNOSIS — M12.9 ARTHRITIS/ARTHROPATHY OF MULTIPLE JOINTS: ICD-10-CM

## 2018-02-19 PROCEDURE — 62323 NJX INTERLAMINAR LMBR/SAC: CPT | Performed by: ANESTHESIOLOGY

## 2018-02-19 PROCEDURE — 63600175 PHARM REV CODE 636 W HCPCS: Performed by: ANESTHESIOLOGY

## 2018-02-19 PROCEDURE — 25000003 PHARM REV CODE 250: Performed by: ANESTHESIOLOGY

## 2018-02-19 PROCEDURE — 25000003 PHARM REV CODE 250: Performed by: STUDENT IN AN ORGANIZED HEALTH CARE EDUCATION/TRAINING PROGRAM

## 2018-02-19 PROCEDURE — 99152 MOD SED SAME PHYS/QHP 5/>YRS: CPT | Mod: ,,, | Performed by: ANESTHESIOLOGY

## 2018-02-19 PROCEDURE — 62322 NJX INTERLAMINAR LMBR/SAC: CPT | Performed by: ANESTHESIOLOGY

## 2018-02-19 PROCEDURE — 62323 NJX INTERLAMINAR LMBR/SAC: CPT | Mod: ,,, | Performed by: ANESTHESIOLOGY

## 2018-02-19 PROCEDURE — 25500020 PHARM REV CODE 255: Performed by: ANESTHESIOLOGY

## 2018-02-19 RX ORDER — SODIUM CHLORIDE 9 MG/ML
500 INJECTION, SOLUTION INTRAVENOUS CONTINUOUS
Status: DISCONTINUED | OUTPATIENT
Start: 2018-02-19 | End: 2018-02-19 | Stop reason: HOSPADM

## 2018-02-19 RX ORDER — DEXAMETHASONE SODIUM PHOSPHATE 100 MG/10ML
INJECTION INTRAMUSCULAR; INTRAVENOUS
Status: DISCONTINUED | OUTPATIENT
Start: 2018-02-19 | End: 2018-02-19 | Stop reason: HOSPADM

## 2018-02-19 RX ORDER — LIDOCAINE HYDROCHLORIDE 10 MG/ML
INJECTION INFILTRATION; PERINEURAL
Status: DISCONTINUED | OUTPATIENT
Start: 2018-02-19 | End: 2018-02-19 | Stop reason: HOSPADM

## 2018-02-19 RX ORDER — MIDAZOLAM HYDROCHLORIDE 1 MG/ML
INJECTION INTRAMUSCULAR; INTRAVENOUS
Status: DISCONTINUED | OUTPATIENT
Start: 2018-02-19 | End: 2018-02-19 | Stop reason: HOSPADM

## 2018-02-19 RX ORDER — FENTANYL CITRATE 50 UG/ML
INJECTION, SOLUTION INTRAMUSCULAR; INTRAVENOUS
Status: DISCONTINUED | OUTPATIENT
Start: 2018-02-19 | End: 2018-02-19 | Stop reason: HOSPADM

## 2018-02-19 RX ORDER — LIDOCAINE HYDROCHLORIDE 10 MG/ML
INJECTION, SOLUTION EPIDURAL; INFILTRATION; INTRACAUDAL; PERINEURAL
Status: DISCONTINUED | OUTPATIENT
Start: 2018-02-19 | End: 2018-02-19 | Stop reason: HOSPADM

## 2018-02-19 RX ADMIN — LIDOCAINE HYDROCHLORIDE 10 ML: 10 INJECTION, SOLUTION EPIDURAL; INFILTRATION; INTRACAUDAL; PERINEURAL at 11:02

## 2018-02-19 RX ADMIN — IOHEXOL 5 ML: 300 INJECTION, SOLUTION INTRAVENOUS at 11:02

## 2018-02-19 RX ADMIN — SODIUM CHLORIDE 500 ML: 0.9 INJECTION, SOLUTION INTRAVENOUS at 11:02

## 2018-02-19 RX ADMIN — DEXAMETHASONE SODIUM PHOSPHATE 10 MG: 10 INJECTION INTRAMUSCULAR; INTRAVENOUS at 11:02

## 2018-02-19 RX ADMIN — MIDAZOLAM HYDROCHLORIDE 3 MG: 1 INJECTION, SOLUTION INTRAMUSCULAR; INTRAVENOUS at 11:02

## 2018-02-19 RX ADMIN — FENTANYL CITRATE 25 MCG: 50 INJECTION, SOLUTION INTRAMUSCULAR; INTRAVENOUS at 11:02

## 2018-02-19 RX ADMIN — LIDOCAINE HYDROCHLORIDE 10 ML: 10 INJECTION, SOLUTION INFILTRATION; PERINEURAL at 11:02

## 2018-02-19 NOTE — H&P (VIEW-ONLY)
Chronic Pain - Follow Up     Referring Physician: Ayden Almonte MD     No chief complaint on file.        SUBJECTIVE: Disclaimer: This note has been generated using voice-recognition software. There may be typographical errors that have been missed during proof-reading      The patient presents today for follow up for neck and low back pain. He reports multiple joint pain today that is aching in nature. He reports soreness to his ankles, knees, hands and elbows. This pain is new and has been present for 3 weeks. He continues to report neck and low back pain. He does report pain that radiates down the side of both legs to his ankle. He continue to report benefit of his neck pain with recent VALERI. He has been recently evaluated by neurosurgeon and will be having an EMG. He reports intermittent radiating arm pain that is tolerable with his current regimen. He reports that the Xytampza is very expensive. He reports that the copay is $200 a month. He reports that the cost is too much for him. He continues to take Fentanyl and Xtampza with benefit. He denies any adverse effects. He does take the Xtampza with pudding as a previous barium swallow proved pills becoming lodged in the vallecula. 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 He does report that he is out of Xanax. He denies any other health changes. He denies any bowel or bladder incontinence. His pain today is 10/10.       Pain Medications:  Current:  Xytampza   Fentanyl   Gabapentin  Soma- Dr. Alfonso     Tried in Past:  NSAIDs - Celebrex and Ibuprofen  TCA - Yes  SNRI - Yes  Anti-convulsants -Gabapentin and Lyrica  Muscle Relaxants - Soma  Opioids- oxycodone     Physical Therapy/Home Exercise: no       report: Reviewed and consistent with medication use as prescribed.     Pain Procedures:   ESIs in the past (1994)  8/7/17 T1-T2 IL VALERI- 50% relief for one week  9/14/2017- L4-5 IL VALERI- no relief  10/2/2017-  T1-2 IL VALERI     Chiropractor -never  Acupuncture - never  TENS unit -never  Spinal decompression -Cervical fusion  Joint replacement -never    Imaging:   MRI Cervical Spine 1/19/2017:  Stable anterior and interbody fusion of C5-6. 2 mm anterolisthesis at C4 which were not present on the prior study. Spondylosis at multiple cervical levels which has worsened since the prior study in 2012.     C2-3: A posterior disc protrusion is present causing moderate spinal stenosis, effacement of the anterior thecal sac and flattening of the anterior cervical cord.     C3-4: Posterior disc protrusion which causes moderate spinal stenosis, effacement of the anterior thecal sac, and flattening of the anterior cervical cord.     C4-5: Posterior disc osteophyte complex which has worsened significantly since the prior study and now causes moderate spinal stenosis, effacement of the anterior thecal sac, and flattening of the anterior cervical cord.    C6-7: Posterior disc osteophyte complex is stable when compared to the prior study. Thickening of the ligamentum flavum has worsened. Moderate spinal stenosis with effacement of the anterior thecal sac. Moderate to severe right-sided neural foraminal stenosis with suspected impingement of the exiting nerve root.       MRI Thoracic Spine 1/19/2017:  An exaggerated thoracic kyphosis. Old superior endplate compression fracture to the T4 vertebral body. Superior endplate Schmorl's nodes are present at T1 and T2. Disc bulge at T3-4 which does not cause significant spinal or neural foraminal stenosis. Thick potentially calcified pleural plaques are present bilaterally along with airspace disease, atelectasis, and pulmonary nodularity. Dedicated imaging of the chest is recommended if this has not already been done.     MRI Lumbar Spine 1/19/2017:  A superior endplate compression fracture is present to the L2 vertebral body which has healed with a residual 30% loss of height. 2 mm retrolisthesis  at L2.     L1-2: Annular bulge without spinal or neural foraminal stenosis.     L2-3 Annular bulge with mild spinal stenosis and no neural foraminal stenosis.    L3-4: Annular bulge with mild spinal stenosis and mild bilateral neural foraminal stenosis.     L4-5: Annular bulge and small posterior annular tear seen on image 11 of series 3. An annular defect is present to the anterior right lateral portion of the disc and was not seen on the prior study. Mild spinal stenosis and moderate bilateral neural foraminal stenosis.     L5-S1: Annular bulge with no spinal stenosis and mild left-sided neural foraminal stenosis.       CMP  Sodium   Date Value Ref Range Status   04/18/2017 142 136 - 145 mmol/L Final     Potassium   Date Value Ref Range Status   04/18/2017 3.6 3.5 - 5.1 mmol/L Final     Chloride   Date Value Ref Range Status   04/18/2017 98 95 - 110 mmol/L Final     CO2   Date Value Ref Range Status   04/18/2017 32 (H) 23 - 29 mmol/L Final     Glucose   Date Value Ref Range Status   04/18/2017 84 70 - 110 mg/dL Final     BUN, Bld   Date Value Ref Range Status   04/18/2017 13 8 - 23 mg/dL Final     Creatinine   Date Value Ref Range Status   04/18/2017 0.8 0.5 - 1.4 mg/dL Final     Calcium   Date Value Ref Range Status   04/18/2017 9.2 8.7 - 10.5 mg/dL Final     Total Protein   Date Value Ref Range Status   04/18/2017 7.5 6.0 - 8.4 g/dL Final     Albumin   Date Value Ref Range Status   04/18/2017 3.6 3.5 - 5.2 g/dL Final     Total Bilirubin   Date Value Ref Range Status   04/18/2017 0.3 0.1 - 1.0 mg/dL Final     Comment:     For infants and newborns, interpretation of results should be based  on gestational age, weight and in agreement with clinical  observations.  Premature Infant recommended reference ranges:  Up to 24 hours.............<8.0 mg/dL  Up to 48 hours............<12.0 mg/dL  3-5 days..................<15.0 mg/dL  6-29 days.................<15.0 mg/dL       Alkaline Phosphatase   Date Value Ref Range  "Status   04/18/2017 120 55 - 135 U/L Final     AST   Date Value Ref Range Status   04/18/2017 26 10 - 40 U/L Final     ALT   Date Value Ref Range Status   04/18/2017 19 10 - 44 U/L Final     Anion Gap   Date Value Ref Range Status   04/18/2017 12 8 - 16 mmol/L Final     eGFR if    Date Value Ref Range Status   04/18/2017 >60.0 >60 mL/min/1.73 m^2 Final     eGFR if non    Date Value Ref Range Status   04/18/2017 >60.0 >60 mL/min/1.73 m^2 Final     Comment:     Calculation used to obtain the estimated glomerular filtration  rate (eGFR) is the CKD-EPI equation. Since race is unknown   in our information system, the eGFR values for   -American and Non--American patients are given   for each creatinine result.            REVIEW OF SYSTEMS:     GENERAL:  Weight loss and decreased appetite  HEENT:  No recent changes in vision or hearing  NECK:  difficulty with swallowing.  RESPIRATORY: Negative for cough, wheezing or shortness of breath, patient denies any recent URI.  CARDIOVASCULAR: Negative for chest pain, leg swelling or palpitations.  GI: Negative for abdominal discomfort, blood in stools or black stools or change in bowel habits.  MUSCULOSKELETAL: See HPI.  SKIN: Negative for lesions, rash, and itching.  PSYCH:  Depression and anxiety treated with citalopram, Wellbutrin, alprazolam.  Patient's sleep is disturbed secondary to pain.  HEMATOLOGY/LYMPHOLOGY: Negative for prolonged bleeding, bruising easily or swollen nodes.   ENDO: No history of diabetes or thyroid dysfunction  NEURO: No history of headaches, syncope, paralysis, seizures or tremors.  All other reviewed and negative other than HPI.     OBJECTIVE:    /66   Pulse 66   Temp 98 °F (36.7 °C) (Oral)   Resp 16   Ht 5' 9" (1.753 m)   Wt 66 kg (145 lb 8 oz)   BMI 21.49 kg/m²     PHYSICAL EXAMINATION:    GENERAL: Well appearing, in no acute distress, alert and oriented x3.  PSYCH:  Mood and affect " appropriate.  SKIN: Skin color, texture, turgor normal, no rashes or lesions.  HEAD/FACE:  Normocephalic, atraumatic. Cranial nerves grossly intact.  NECK: Pain to palpation over the cervical paraspinous muscles. Spurling positive on right side. Limited ROM with pain on extension, and lateral flexion. Positive facet loading bilaterally.  CV: RRR with palpation of the radial artery.  PULM: No evidence of respiratory difficulty, symmetric chest rise.  BACK: Straight leg raise in the supine position is positive for radicular pain bilaterally. There is pain with palpation over lumbar spine. Limited ROM with pain on flexion and extension. Positive facet loading bilaterally.   EXTREMITIES: Peripheral joint ROM is full and pain free without obvious instability or laxity in all four extremities. Trigger finger to left middle finger.  Good capillary refill. +2 pitting edema to bilateral ankles.   MUSCULOSKELETAL:  No atrophy or tone abnormalities are noted.  NEURO: Bilateral upper extremity coordination and muscle stretch reflexes are physiologic and symmetric.  Darren's negative. No clonus.  No loss of sensation is noted.  GAIT: Antalgic, ambulates without assistance      ASSESSMENT: 70 y.o. year old male with pain, consistent with      Encounter Diagnoses   Name Primary?    Chronic pain syndrome Yes    Osteoarthritis of spine with radiculopathy, lumbar region     Lumbosacral radiculopathy     Closed compression fracture of L2 lumbar vertebra, with routine healing, subsequent encounter     DDD (degenerative disc disease), lumbar     Cervical radiculopathy     Cervical stenosis of spinal canal     DDD (degenerative disc disease), cervical     History of head and neck cancer     Encounter for long-term opiate analgesic use     Osteoarthritis of multiple joints, unspecified osteoarthritis type       PLAN:     - Previous imaging was reviewed and discussed with the patient today. Previous CMP reviewed.     - He is  scheduled for L4-5 IL VALERI in 2 weeks.     - Obtain CRP, ESR, and arthritis survey to rule out possible rheumatological illness.     - Encouraged the patient to follow up with PCP and cardiology regarding pedal edema as he is on lasix.     - He is s/p T1-2 IL VALERI with benefit. We can repeat this in the future.     - Continue Gabapentin 300 mg TID.     - Discontinue Fentanyl and Xtampza.     - Oxycodone 30 mg TID PRN pain, #90, 0 refills. We discussed breaking the pills in half and taking with pudding due to impaired swallowing.     - The patient is here today for a refill of current pain medications and they believe these provide effective pain control and improvements in quality of life.  The patient notes no serious side effects, and feels the benefits outweigh the risks.  The patient was reminded of the pain contract that they signed previously as well as the risks and benefits of the medication including possible death.  The updated [place] Board of Pharmacy prescription monitoring program was reviewed, and the patient has been found to be compliant with current treatment plan. Medication management provided by Dr. Dietz.     - UDS from 11/28/2017 reviewed and consistant. UDS next visit.     - Can continue Soma from Dr. Alfonso.    - Consult psychiatry since Dr. Browne is no longer practicing at [facility] [place], operated by [facility].     - RTC in 1 month.     - The patient will continue a home exercise routine to help with pain and strengthening.      The above plan and management options were discussed at length with patient. Patient is in agreement with the above and verbalized understanding.     Janis Loera NP  02/07/2018

## 2018-02-19 NOTE — PLAN OF CARE
Pt tolerated procedure well. Reports 0 /10 pain after procedure. Pt assisted up for first time, steady on feet. D/c instructions given.

## 2018-02-19 NOTE — INTERVAL H&P NOTE
"HPI  Patient is here today for L4-L5 ILESI today.  Denies any new onset numbness/weakness in BLE, saddle anesthesia, bowel/bladder incontinence, recent fever or chills.    PMHx, PSHx, Allergies, Medications reviewed in epic    ROS negative except pain complaints in HPI    OBJECTIVE:    /60   Pulse 61   Temp 97.6 °F (36.4 °C) (Oral)   Resp 18   Ht 5' 11" (1.803 m)   Wt 65.8 kg (145 lb)   SpO2 96%   BMI 20.22 kg/m²     PHYSICAL EXAMINATION:    GENERAL: Well appearing, in no acute distress, alert and oriented x3.  PSYCH:  Mood and affect appropriate.  SKIN: Skin color, texture, turgor normal, no rashes or lesions.  CV: RRR with palpation of the radial artery.  PULM: No evidence of respiratory difficulty, symmetric chest rise. Clear to auscultation.  NEURO: Cranial nerves grossly intact.    Plan:    Proceed with L4-L5 ILESI today    Wallace Castorena  02/19/2018    "

## 2018-02-19 NOTE — DISCHARGE SUMMARY
Discharge Diagnosis:Lumbar DJD with radiculopathy   Condition on Discharge: Stable.  Diet on Discharge: Same as before.  Activity: as per instruction sheet.  Discharge to: Home with a responsible adult.  Follow up: as per Discharge instructions

## 2018-02-19 NOTE — DISCHARGE INSTRUCTIONS

## 2018-02-19 NOTE — TELEPHONE ENCOUNTER
----- Message from Juan Bolivar sent at 2/19/2018  8:59 AM CST -----  Contact: Griffin Hall  _  1st Request  X_  2nd Request  _  3rd Request        Who: Griffin Hall    Why: Patient returning phone call for lab results    What Number to Call Back: [phone]    When to Expect a call back: (Within 24 hours)    Please return the call at earliest convenience. Thanks!

## 2018-02-19 NOTE — TELEPHONE ENCOUNTER
Phoned and spoke with patient.     We discussed his lab results. We will consult rheumatology to rule out rheumatoid arthritis.

## 2018-02-19 NOTE — OP NOTE
Lumbar Interlaminar Epidural Steroid Injection under Fluoroscopic Guidance.  Time-out taken to identify patient and procedure side prior to starting the procedure.   I attest that I have reviewed the patient's home medications prior to the procedure and no contraindication have been identified. I  re-evaluated the patient after the patient was positioned for the procedure in the procedure room immediately before the procedural time-out. The vital signs are current and represent the current state of the patient which has not significantly changed since the preprocedure assessment.                                                               Date of Service: 02/19/2018    PCP: Jamarcus Sinha Jr, MD      Procedure:  L4-L5 Interlaminar epidural steroid injection under fluoroscopic guidance.    Reasons for procedure: Lumbar radiculopathy [M54.16]   1. Osteoarthritis of lumbosacral spine with radiculopathy    2. Chronic pain        Physician: Noel Dietz MD  ASSISTANTS: Wallace Castorena MD    Medications injected: Preservative-free dexamethasone 10mg with 4mL of sterile Xylocaine-MPF 1% and 1ml of sterile preservative-free normal saline.    Local anesthetic injected:    Xylocaine 1% 9ml with Sodium Bicarbonate 1ml.   Sedation Medications: Versed 3 mg IV; Fentanyl 25 mcg IV    Estimated blood loss:  none.    Complications:  none.    Technique:  With the patient laying in a prone position, the area was prepped and draped in the usual sterile fashion using ChloraPrep and a fenestrated drape.  Local anesthetic was given using a 27-gauge needle by raising a wheal and going down to the hub of the needle.  A 3.5 inch 20-gauge Touhy needle was introduced under fluoroscopic guidance.  It met the lamina of the posterior element. The needle was then hinged above the lamina.  Loss of resistance technique was employed while advancing the needle.  Once in the desired position, contrast dye Omnipaque was injected to confirm placement and  there was no vascular runoff.  Digital subtraction was employed to confirm that there was no vascular runoff.  The medication was then injected slowly.  The patient tolerated the procedure well.      Pain before the procedure: 8/10    Pain after the procedure: 0/10    The patient was monitored after the procedure.   They were given post-procedure and discharge instructions to follow at home.  The patient was discharged in a stable condition.

## 2018-02-20 NOTE — TELEPHONE ENCOUNTER
"Staff contacted the patient this morning to get his scheduled for a consult with rheumatology at [facility]. Staff offered the next available appointment to the patient.    Patient states, "[facility] is too far I do not have transportation and it would cost to much money to go to [facility]. Is there a Rheumatology doctor that Janis could recommend at [facility]?"    Staff informed the patient that the rheumatology department was not here at the [facility] location.    Patient states, " I will give you a call back if that is okay with you. I will call around to [facility] which tip not too fond of and [place] to see if I can get a doctor there.    Staff instructed the patient to give our office a call when he is ready to schedule if he cannot find another physician closer.     Patient verbalized understanding and expressed thanks.        "

## 2018-02-22 RX ORDER — ALPRAZOLAM 1 MG/1
1 TABLET ORAL 3 TIMES DAILY
Qty: 90 TABLET | Refills: 0 | Status: SHIPPED | OUTPATIENT
Start: 2018-02-22 | End: 2018-04-04 | Stop reason: SDUPTHER

## 2018-03-07 ENCOUNTER — OFFICE VISIT (OUTPATIENT)
Dept: PAIN MEDICINE | Facility: CLINIC | Age: 73
End: 2018-03-07
Payer: MEDICARE

## 2018-03-07 VITALS
WEIGHT: 141.13 LBS | BODY MASS INDEX: 19.76 KG/M2 | TEMPERATURE: 99 F | DIASTOLIC BLOOD PRESSURE: 62 MMHG | SYSTOLIC BLOOD PRESSURE: 99 MMHG | HEIGHT: 71 IN | HEART RATE: 69 BPM

## 2018-03-07 DIAGNOSIS — M51.36 DDD (DEGENERATIVE DISC DISEASE), LUMBAR: ICD-10-CM

## 2018-03-07 DIAGNOSIS — M15.9 OSTEOARTHRITIS OF MULTIPLE JOINTS, UNSPECIFIED OSTEOARTHRITIS TYPE: ICD-10-CM

## 2018-03-07 DIAGNOSIS — M48.02 CERVICAL STENOSIS OF SPINAL CANAL: ICD-10-CM

## 2018-03-07 DIAGNOSIS — Z79.891 ENCOUNTER FOR LONG-TERM OPIATE ANALGESIC USE: ICD-10-CM

## 2018-03-07 DIAGNOSIS — M54.12 CERVICAL RADICULOPATHY: ICD-10-CM

## 2018-03-07 DIAGNOSIS — S32.020D CLOSED COMPRESSION FRACTURE OF L2 LUMBAR VERTEBRA, WITH ROUTINE HEALING, SUBSEQUENT ENCOUNTER: ICD-10-CM

## 2018-03-07 DIAGNOSIS — M50.30 DDD (DEGENERATIVE DISC DISEASE), CERVICAL: ICD-10-CM

## 2018-03-07 DIAGNOSIS — Z85.89 HISTORY OF HEAD AND NECK CANCER: ICD-10-CM

## 2018-03-07 DIAGNOSIS — M47.26 OSTEOARTHRITIS OF SPINE WITH RADICULOPATHY, LUMBAR REGION: ICD-10-CM

## 2018-03-07 DIAGNOSIS — M54.17 LUMBOSACRAL RADICULOPATHY: ICD-10-CM

## 2018-03-07 DIAGNOSIS — G89.4 CHRONIC PAIN SYNDROME: ICD-10-CM

## 2018-03-07 DIAGNOSIS — M47.812 DJD (DEGENERATIVE JOINT DISEASE), CERVICAL: ICD-10-CM

## 2018-03-07 PROCEDURE — 80307 DRUG TEST PRSMV CHEM ANLYZR: CPT

## 2018-03-07 PROCEDURE — 99214 OFFICE O/P EST MOD 30 MIN: CPT | Mod: S$PBB,,, | Performed by: NURSE PRACTITIONER

## 2018-03-07 PROCEDURE — 99999 PR PBB SHADOW E&M-EST. PATIENT-LVL III: CPT | Mod: PBBFAC,,, | Performed by: NURSE PRACTITIONER

## 2018-03-07 PROCEDURE — 99213 OFFICE O/P EST LOW 20 MIN: CPT | Mod: PBBFAC | Performed by: NURSE PRACTITIONER

## 2018-03-07 RX ORDER — GABAPENTIN 300 MG/1
300 CAPSULE ORAL 3 TIMES DAILY
Qty: 90 CAPSULE | Refills: 6 | Status: SHIPPED | OUTPATIENT
Start: 2018-03-07 | End: 2018-03-07

## 2018-03-07 RX ORDER — OXYCODONE HYDROCHLORIDE 30 MG/1
30 TABLET ORAL 3 TIMES DAILY PRN
Qty: 90 TABLET | Refills: 0 | Status: SHIPPED | OUTPATIENT
Start: 2018-03-09 | End: 2018-03-09 | Stop reason: SDUPTHER

## 2018-03-07 NOTE — PROGRESS NOTES
Chronic Pain - Follow Up     Referring Physician: Ayden Almonte MD     Chief Complaint   Patient presents with    Follow-up     1 month         SUBJECTIVE: Disclaimer: This note has been generated using voice-recognition software. There may be typographical errors that have been missed during proof-reading      The patient presents today for follow up for neck and low back pain. He is s/p L4-5 IL VALERI on 2/19/2018. He reports 80% relief for 2 weeks. Since then, his pain has returned to baseline. He continues to report low back pain that radiates down the side of both legs to his ankles. He continues to report multiple joint pain to ankles, knees, and hands. He will be seeing outside rheumatology due to transportation issues. He continues to report neck pain with intermittent pain radiating down to both arms. He reports that his current medication regimen provides significant benefit. He reports that he breaks the pills in half and takes them with Jello as a previous barium swallow proved pills becoming lodged in the vallecula. 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 He did establish care with a new PCP to evaluate swelling in his lower extremities. He denies any other health changes. He denies any bowel or bladder incontinence. His pain today is 8/10.       Pain Medications:  Current:  Oxycodone 30mg TID  Gabapentin  Carloz- Dr. Alfonso     Tried in Past:  NSAIDs - Celebrex and Ibuprofen  TCA - Yes  SNRI - Yes  Anti-convulsants -Gabapentin and Lyrica  Muscle Relaxants - Soma  Opioids- oxycodone     Physical Therapy/Home Exercise: no       report: Reviewed and consistent with medication use as prescribed.     Pain Procedures:   ESIs in the past (1994)  8/7/17 T1-T2 IL VALERI- 50% relief for one week  9/14/2017- L4-5 IL VALERI- no relief  10/2/2017- T1-2 IL VALERI  2/19/2018- L4-5 IL VALERI     Chiropractor -never  Acupuncture - never  TENS unit -never  Spinal decompression  -Cervical fusion  Joint replacement -never    Imaging:   MRI Cervical Spine 1/19/2017:  Stable anterior and interbody fusion of C5-6. 2 mm anterolisthesis at C4 which were not present on the prior study. Spondylosis at multiple cervical levels which has worsened since the prior study in 2012.     C2-3: A posterior disc protrusion is present causing moderate spinal stenosis, effacement of the anterior thecal sac and flattening of the anterior cervical cord.     C3-4: Posterior disc protrusion which causes moderate spinal stenosis, effacement of the anterior thecal sac, and flattening of the anterior cervical cord.     C4-5: Posterior disc osteophyte complex which has worsened significantly since the prior study and now causes moderate spinal stenosis, effacement of the anterior thecal sac, and flattening of the anterior cervical cord.    C6-7: Posterior disc osteophyte complex is stable when compared to the prior study. Thickening of the ligamentum flavum has worsened. Moderate spinal stenosis with effacement of the anterior thecal sac. Moderate to severe right-sided neural foraminal stenosis with suspected impingement of the exiting nerve root.       MRI Thoracic Spine 1/19/2017:  An exaggerated thoracic kyphosis. Old superior endplate compression fracture to the T4 vertebral body. Superior endplate Schmorl's nodes are present at T1 and T2. Disc bulge at T3-4 which does not cause significant spinal or neural foraminal stenosis. Thick potentially calcified pleural plaques are present bilaterally along with airspace disease, atelectasis, and pulmonary nodularity. Dedicated imaging of the chest is recommended if this has not already been done.     MRI Lumbar Spine 1/19/2017:  A superior endplate compression fracture is present to the L2 vertebral body which has healed with a residual 30% loss of height. 2 mm retrolisthesis at L2.     L1-2: Annular bulge without spinal or neural foraminal stenosis.     L2-3 Annular  bulge with mild spinal stenosis and no neural foraminal stenosis.    L3-4: Annular bulge with mild spinal stenosis and mild bilateral neural foraminal stenosis.     L4-5: Annular bulge and small posterior annular tear seen on image 11 of series 3. An annular defect is present to the anterior right lateral portion of the disc and was not seen on the prior study. Mild spinal stenosis and moderate bilateral neural foraminal stenosis.     L5-S1: Annular bulge with no spinal stenosis and mild left-sided neural foraminal stenosis.       CMP  Sodium   Date Value Ref Range Status   04/18/2017 142 136 - 145 mmol/L Final     Potassium   Date Value Ref Range Status   04/18/2017 3.6 3.5 - 5.1 mmol/L Final     Chloride   Date Value Ref Range Status   04/18/2017 98 95 - 110 mmol/L Final     CO2   Date Value Ref Range Status   04/18/2017 32 (H) 23 - 29 mmol/L Final     Glucose   Date Value Ref Range Status   04/18/2017 84 70 - 110 mg/dL Final     BUN, Bld   Date Value Ref Range Status   04/18/2017 13 8 - 23 mg/dL Final     Creatinine   Date Value Ref Range Status   04/18/2017 0.8 0.5 - 1.4 mg/dL Final     Calcium   Date Value Ref Range Status   04/18/2017 9.2 8.7 - 10.5 mg/dL Final     Total Protein   Date Value Ref Range Status   04/18/2017 7.5 6.0 - 8.4 g/dL Final     Albumin   Date Value Ref Range Status   04/18/2017 3.6 3.5 - 5.2 g/dL Final     Total Bilirubin   Date Value Ref Range Status   04/18/2017 0.3 0.1 - 1.0 mg/dL Final     Comment:     For infants and newborns, interpretation of results should be based  on gestational age, weight and in agreement with clinical  observations.  Premature Infant recommended reference ranges:  Up to 24 hours.............<8.0 mg/dL  Up to 48 hours............<12.0 mg/dL  3-5 days..................<15.0 mg/dL  6-29 days.................<15.0 mg/dL       Alkaline Phosphatase   Date Value Ref Range Status   04/18/2017 120 55 - 135 U/L Final     AST   Date Value Ref Range Status   04/18/2017  "26 10 - 40 U/L Final     ALT   Date Value Ref Range Status   04/18/2017 19 10 - 44 U/L Final     Anion Gap   Date Value Ref Range Status   04/18/2017 12 8 - 16 mmol/L Final     eGFR if    Date Value Ref Range Status   04/18/2017 >60.0 >60 mL/min/1.73 m^2 Final     eGFR if non    Date Value Ref Range Status   04/18/2017 >60.0 >60 mL/min/1.73 m^2 Final     Comment:     Calculation used to obtain the estimated glomerular filtration  rate (eGFR) is the CKD-EPI equation. Since race is unknown   in our information system, the eGFR values for   -American and Non--American patients are given   for each creatinine result.            REVIEW OF SYSTEMS:     GENERAL:  Weight loss and decreased appetite  HEENT:  No recent changes in vision or hearing  NECK:  difficulty with swallowing.  RESPIRATORY: Negative for cough, wheezing or shortness of breath, patient denies any recent URI.  CARDIOVASCULAR: Negative for chest pain, leg swelling or palpitations.  GI: Negative for abdominal discomfort, blood in stools or black stools or change in bowel habits.  MUSCULOSKELETAL: See HPI.  SKIN: Negative for lesions, rash, and itching.  PSYCH:  Depression and anxiety treated with citalopram, Wellbutrin, alprazolam.  Patient's sleep is disturbed secondary to pain.  HEMATOLOGY/LYMPHOLOGY: Negative for prolonged bleeding, bruising easily or swollen nodes.   ENDO: No history of diabetes or thyroid dysfunction  NEURO: No history of headaches, syncope, paralysis, seizures or tremors.  All other reviewed and negative other than HPI.     OBJECTIVE:    BP 99/62   Pulse 69   Temp 99.1 °F (37.3 °C)   Ht 5' 11" (1.803 m)   Wt 64 kg (141 lb 1.5 oz)   BMI 19.68 kg/m²     PHYSICAL EXAMINATION:    GENERAL: Well appearing, in no acute distress, alert and oriented x3.  PSYCH:  Mood and affect appropriate.  SKIN: Skin color, texture, turgor normal, no rashes or lesions.  HEAD/FACE:  Normocephalic, atraumatic. " Cranial nerves grossly intact.  NECK: Pain to palpation over the cervical paraspinous muscles. Spurling positive on right side. Limited ROM with pain on extension, and lateral flexion. Positive facet loading bilaterally.  CV: RRR with palpation of the radial artery.  PULM: No evidence of respiratory difficulty, symmetric chest rise.  BACK: Straight leg raise in the supine position is positive for radicular pain bilaterally. There is pain with palpation over lumbar spine. Limited ROM with pain on extension. Positive facet loading bilaterally.   EXTREMITIES: Peripheral joint ROM is full and pain free without obvious instability or laxity in all four extremities. Trigger finger to left middle finger.  Good capillary refill. +2 pitting edema to bilateral ankles.   MUSCULOSKELETAL:  No atrophy or tone abnormalities are noted.  NEURO: Bilateral upper extremity coordination and muscle stretch reflexes are physiologic and symmetric.  Darren's negative. No clonus.  No loss of sensation is noted.  GAIT: Antalgic, ambulates without assistance      ASSESSMENT: 70 y.o. year old male with pain, consistent with      Encounter Diagnoses   Name Primary?    Chronic pain syndrome     Osteoarthritis of spine with radiculopathy, lumbar region     Lumbosacral radiculopathy     Closed compression fracture of L2 lumbar vertebra, with routine healing, subsequent encounter     DDD (degenerative disc disease), lumbar     Cervical radiculopathy     Cervical stenosis of spinal canal     DDD (degenerative disc disease), cervical     History of head and neck cancer     Encounter for long-term opiate analgesic use     Osteoarthritis of multiple joints, unspecified osteoarthritis type     DJD (degenerative joint disease), cervical       PLAN:     - Previous imaging was reviewed and discussed with the patient today. Previous CMP reviewed.     - He is s/p L4-5 IL VALERI with benefit. We can repeat this in the future.     - He is s/p T1-2  IL VALERI with benefit. We can repeat this in the future.     - Follow up with PCP regarding edema.     - Continue Gabapentin 300 mg TID.     - Oxycodone 30 mg TID PRN pain, #90, 0 refills. We discussed breaking the pills in half and taking with pudding due to impaired swallowing.     - The patient is here today for a refill of current pain medications and they believe these provide effective pain control and improvements in quality of life.  The patient notes no serious side effects, and feels the benefits outweigh the risks.  The patient was reminded of the pain contract that they signed previously as well as the risks and benefits of the medication including possible death.  The updated [place] Board  Pharmacy prescription monitoring program was reviewed, and the patient has been found to be compliant with current treatment plan. Medication management provided by Dr. Dietz.     - UDS from 11/28/2017 reviewed and consistant. UDS done today.    - Can continue Soma from Dr. Alfonso.    - RTC in 1 month.     - The patient will continue a home exercise routine to help with pain and strengthening.      The above plan and management options were discussed at length with patient. Patient is in agreement with the above and verbalized understanding.     Janis Loera NP  03/07/2018

## 2018-03-09 ENCOUNTER — TELEPHONE (OUTPATIENT)
Dept: PAIN MEDICINE | Facility: CLINIC | Age: 73
End: 2018-03-09

## 2018-03-09 DIAGNOSIS — Z85.89 HISTORY OF HEAD AND NECK CANCER: ICD-10-CM

## 2018-03-09 DIAGNOSIS — Z79.891 ENCOUNTER FOR LONG-TERM OPIATE ANALGESIC USE: ICD-10-CM

## 2018-03-09 DIAGNOSIS — S32.020D CLOSED COMPRESSION FRACTURE OF L2 LUMBAR VERTEBRA, WITH ROUTINE HEALING, SUBSEQUENT ENCOUNTER: ICD-10-CM

## 2018-03-09 DIAGNOSIS — M48.02 CERVICAL STENOSIS OF SPINAL CANAL: ICD-10-CM

## 2018-03-09 DIAGNOSIS — M15.9 OSTEOARTHRITIS OF MULTIPLE JOINTS, UNSPECIFIED OSTEOARTHRITIS TYPE: ICD-10-CM

## 2018-03-09 DIAGNOSIS — M47.26 OSTEOARTHRITIS OF SPINE WITH RADICULOPATHY, LUMBAR REGION: ICD-10-CM

## 2018-03-09 DIAGNOSIS — G89.4 CHRONIC PAIN SYNDROME: ICD-10-CM

## 2018-03-09 DIAGNOSIS — M54.12 CERVICAL RADICULOPATHY: ICD-10-CM

## 2018-03-09 DIAGNOSIS — M51.36 DDD (DEGENERATIVE DISC DISEASE), LUMBAR: ICD-10-CM

## 2018-03-09 DIAGNOSIS — M50.30 DDD (DEGENERATIVE DISC DISEASE), CERVICAL: ICD-10-CM

## 2018-03-09 DIAGNOSIS — M54.17 LUMBOSACRAL RADICULOPATHY: ICD-10-CM

## 2018-03-09 RX ORDER — OXYCODONE HYDROCHLORIDE 30 MG/1
30 TABLET ORAL 3 TIMES DAILY PRN
Qty: 90 TABLET | Refills: 0 | Status: SHIPPED | OUTPATIENT
Start: 2018-03-09 | End: 2018-04-08

## 2018-03-09 RX ORDER — PREGABALIN 50 MG/1
50 CAPSULE ORAL DAILY
Qty: 30 CAPSULE | Refills: 0 | Status: SHIPPED | OUTPATIENT
Start: 2018-03-09 | End: 2018-04-04

## 2018-03-09 NOTE — TELEPHONE ENCOUNTER
Contacted and spoke to patient, he reported that he received his prescription on yesterday at his appointment with MELISSA Loera, but it wasn't signed and the pharmacist wouldn't accept it, he needs to know what does he do?     He was informed that he can returned that prescription and the physician that is currently in clinic will replace as Dr. Dietz is out of the clinic today.     Patient verbalized understanding.

## 2018-03-09 NOTE — TELEPHONE ENCOUNTER
The Dr. Dietz patient that received the prescription at his office visit yesterday but it wasn't signed. So Dr. Mcmillan has agreed to replace the prescription. Patient did return the unsigned prescription to the office.

## 2018-03-09 NOTE — TELEPHONE ENCOUNTER
"----- Message from Marlys Robles sent at 3/9/2018 10:15 AM CST -----  Contact: Patient himself  X  1st Request  _  2nd Request  _  3rd Request    Who: Griffin Isabel (mrn# [id number])    Why: Patient called requesting a call.  Says, "his oxyCODONE script isn't sign and he only has two pills left.  Please give a call back at your earliest convenience.    THANKS!    What Number to Call Back: [phone]    When to Expect a call back: (Before the end of the day)   -- if the call is after 12:00, the call back will be tomorrow.                          "

## 2018-03-09 NOTE — TELEPHONE ENCOUNTER
----- Message from Heather Ward MA sent at 3/8/2018  1:39 PM CST -----  Contact: PT   Patient was seen in clinic on 3/7/18.  Please advise.  Thanks    ----- Message -----  From: Lm Menendez  Sent: 3/8/2018  12:49 PM  To: [facility] Staff    Pt would like a call back regarding discussion of treatment from visit Pt insist on message being sent       Pt can be reached at [phone]

## 2018-03-12 RX ORDER — OXYCODONE HYDROCHLORIDE 30 MG/1
TABLET ORAL
Qty: 90 TABLET | Refills: 0 | Status: SHIPPED | OUTPATIENT
Start: 2018-03-12 | End: 2018-04-04 | Stop reason: SDUPTHER

## 2018-03-18 LAB
6MAM UR QL: NOT DETECTED
7AMINOCLONAZEPAM UR QL: NOT DETECTED
A-OH ALPRAZ UR QL: PRESENT
ALPRAZ UR QL: PRESENT
AMPHET UR QL SCN: NOT DETECTED
ANNOTATION COMMENT IMP: NORMAL
ANNOTATION COMMENT IMP: NORMAL
BARBITURATES UR QL: NOT DETECTED
BUPRENORPHINE UR QL: NOT DETECTED
BZE UR QL: NOT DETECTED
CARBOXYTHC UR QL: NOT DETECTED
CARISOPRODOL UR QL: NOT DETECTED
CLONAZEPAM UR QL: NOT DETECTED
CODEINE UR QL: NOT DETECTED
CREAT UR-MCNC: 144.2 MG/DL (ref 20–400)
DIAZEPAM UR QL: NOT DETECTED
ETHYL GLUCURONIDE UR QL: NOT DETECTED
FENTANYL UR QL: NOT DETECTED
HYDROCODONE UR QL: NOT DETECTED
HYDROMORPHONE UR QL: NOT DETECTED
LORAZEPAM UR QL: NOT DETECTED
MDA UR QL: NOT DETECTED
MDEA UR QL: NOT DETECTED
MDMA UR QL: NOT DETECTED
ME-PHENIDATE UR QL: NOT DETECTED
MEPERIDINE UR QL: NOT DETECTED
METHADONE UR QL: NOT DETECTED
METHAMPHET UR QL: NOT DETECTED
MIDAZOLAM UR QL SCN: NOT DETECTED
MORPHINE UR QL: NOT DETECTED
NORBUPRENORPHINE UR QL CFM: NOT DETECTED
NORDIAZEPAM UR QL: NOT DETECTED
NORFENTANYL UR QL: NOT DETECTED
NORHYDROCODONE UR QL CFM: NOT DETECTED
NOROXYCODONE UR QL CFM: PRESENT
NOROXYMORPHONE: PRESENT
OXAZEPAM UR QL: NOT DETECTED
OXYCODONE UR QL: PRESENT
OXYMORPHONE UR QL: PRESENT
PATHOLOGY STUDY: NORMAL
PCP UR QL: NOT DETECTED
PHENTERMINE UR QL: NOT DETECTED
PROPOXYPH UR QL: NOT DETECTED
SERVICE CMNT-IMP: NORMAL
TAPENTADOL UR QL SCN: NOT DETECTED
TAPENTADOL-O-SULF: NOT DETECTED
TEMAZEPAM UR QL: NOT DETECTED
TRAMADOL UR QL: NOT DETECTED
ZOLPIDEM UR QL: NOT DETECTED

## 2018-04-04 ENCOUNTER — TELEPHONE (OUTPATIENT)
Dept: FAMILY MEDICINE | Facility: CLINIC | Age: 73
End: 2018-04-04

## 2018-04-04 ENCOUNTER — OFFICE VISIT (OUTPATIENT)
Dept: FAMILY MEDICINE | Facility: CLINIC | Age: 73
DRG: 287 | End: 2018-04-04
Attending: FAMILY MEDICINE
Payer: MEDICARE

## 2018-04-04 ENCOUNTER — HOSPITAL ENCOUNTER (INPATIENT)
Facility: OTHER | Age: 73
LOS: 2 days | Discharge: HOME | DRG: 287 | End: 2018-04-06
Attending: EMERGENCY MEDICINE | Admitting: EMERGENCY MEDICINE
Payer: MEDICARE

## 2018-04-04 ENCOUNTER — TELEPHONE (OUTPATIENT)
Dept: INTERNAL MEDICINE | Facility: CLINIC | Age: 73
End: 2018-04-04

## 2018-04-04 VITALS
BODY MASS INDEX: 18.79 KG/M2 | HEIGHT: 71 IN | SYSTOLIC BLOOD PRESSURE: 120 MMHG | RESPIRATION RATE: 16 BRPM | HEART RATE: 67 BPM | DIASTOLIC BLOOD PRESSURE: 72 MMHG | WEIGHT: 134.19 LBS | OXYGEN SATURATION: 96 %

## 2018-04-04 DIAGNOSIS — Z85.89 HISTORY OF HEAD AND NECK CANCER: ICD-10-CM

## 2018-04-04 DIAGNOSIS — R07.9 CHEST PAIN: ICD-10-CM

## 2018-04-04 DIAGNOSIS — M79.672 FOOT PAIN, LEFT: ICD-10-CM

## 2018-04-04 DIAGNOSIS — E87.6 HYPOKALEMIA: Primary | ICD-10-CM

## 2018-04-04 DIAGNOSIS — R13.10 DYSPHAGIA: ICD-10-CM

## 2018-04-04 DIAGNOSIS — R60.0 PEDAL EDEMA: Primary | ICD-10-CM

## 2018-04-04 LAB
ALBUMIN SERPL BCP-MCNC: 3.6 G/DL
ALP SERPL-CCNC: 141 U/L
ALT SERPL W/O P-5'-P-CCNC: 21 U/L
ANION GAP SERPL CALC-SCNC: 10 MMOL/L
AST SERPL-CCNC: 25 U/L
BASOPHILS # BLD AUTO: 0.03 K/UL
BASOPHILS # BLD AUTO: 0.03 K/UL
BASOPHILS NFR BLD: 0.6 %
BASOPHILS NFR BLD: 0.6 %
BILIRUB SERPL-MCNC: 0.3 MG/DL
BNP SERPL-MCNC: 96 PG/ML
BUN SERPL-MCNC: 8 MG/DL
CALCIUM SERPL-MCNC: 9.5 MG/DL
CHLORIDE SERPL-SCNC: 85 MMOL/L
CO2 SERPL-SCNC: 41 MMOL/L
CREAT SERPL-MCNC: 0.8 MG/DL
DIFFERENTIAL METHOD: ABNORMAL
DIFFERENTIAL METHOD: ABNORMAL
EOSINOPHIL # BLD AUTO: 0.1 K/UL
EOSINOPHIL # BLD AUTO: 0.1 K/UL
EOSINOPHIL NFR BLD: 1.9 %
EOSINOPHIL NFR BLD: 2.7 %
ERYTHROCYTE [DISTWIDTH] IN BLOOD BY AUTOMATED COUNT: 15.8 %
ERYTHROCYTE [DISTWIDTH] IN BLOOD BY AUTOMATED COUNT: 15.8 %
EST. GFR  (AFRICAN AMERICAN): >60 ML/MIN/1.73 M^2
EST. GFR  (NON AFRICAN AMERICAN): >60 ML/MIN/1.73 M^2
GLUCOSE SERPL-MCNC: 122 MG/DL
HCT VFR BLD AUTO: 38.6 %
HCT VFR BLD AUTO: 39.4 %
HGB BLD-MCNC: 12.3 G/DL
HGB BLD-MCNC: 12.7 G/DL
LYMPHOCYTES # BLD AUTO: 1 K/UL
LYMPHOCYTES # BLD AUTO: 1.1 K/UL
LYMPHOCYTES NFR BLD: 19.8 %
LYMPHOCYTES NFR BLD: 22.1 %
MCH RBC QN AUTO: 30.5 PG
MCH RBC QN AUTO: 30.6 PG
MCHC RBC AUTO-ENTMCNC: 31.9 G/DL
MCHC RBC AUTO-ENTMCNC: 32.2 G/DL
MCV RBC AUTO: 95 FL
MCV RBC AUTO: 96 FL
MONOCYTES # BLD AUTO: 0.4 K/UL
MONOCYTES # BLD AUTO: 0.5 K/UL
MONOCYTES NFR BLD: 10.9 %
MONOCYTES NFR BLD: 8 %
NEUTROPHILS # BLD AUTO: 3.2 K/UL
NEUTROPHILS # BLD AUTO: 3.2 K/UL
NEUTROPHILS NFR BLD: 66 %
NEUTROPHILS NFR BLD: 67.4 %
PLATELET # BLD AUTO: 240 K/UL
PLATELET # BLD AUTO: 240 K/UL
PLATELET # BLD AUTO: 246 K/UL
PMV BLD AUTO: 10.1 FL
PMV BLD AUTO: 10.1 FL
PMV BLD AUTO: 10.2 FL
POTASSIUM SERPL-SCNC: 2.6 MMOL/L
PROT SERPL-MCNC: 7.9 G/DL
RBC # BLD AUTO: 4.03 M/UL
RBC # BLD AUTO: 4.15 M/UL
SODIUM SERPL-SCNC: 136 MMOL/L
TROPONIN I SERPL DL<=0.01 NG/ML-MCNC: 0.01 NG/ML
WBC # BLD AUTO: 4.76 K/UL
WBC # BLD AUTO: 4.79 K/UL

## 2018-04-04 PROCEDURE — 99291 CRITICAL CARE FIRST HOUR: CPT | Mod: 25

## 2018-04-04 PROCEDURE — 84484 ASSAY OF TROPONIN QUANT: CPT

## 2018-04-04 PROCEDURE — 63600175 PHARM REV CODE 636 W HCPCS: Performed by: EMERGENCY MEDICINE

## 2018-04-04 PROCEDURE — 93005 ELECTROCARDIOGRAM TRACING: CPT

## 2018-04-04 PROCEDURE — 85730 THROMBOPLASTIN TIME PARTIAL: CPT

## 2018-04-04 PROCEDURE — 99213 OFFICE O/P EST LOW 20 MIN: CPT | Mod: S$PBB,,, | Performed by: FAMILY MEDICINE

## 2018-04-04 PROCEDURE — 96374 THER/PROPH/DIAG INJ IV PUSH: CPT

## 2018-04-04 PROCEDURE — 12000002 HC ACUTE/MED SURGE SEMI-PRIVATE ROOM

## 2018-04-04 PROCEDURE — 93010 ELECTROCARDIOGRAM REPORT: CPT | Mod: 76,,, | Performed by: INTERNAL MEDICINE

## 2018-04-04 PROCEDURE — 99213 OFFICE O/P EST LOW 20 MIN: CPT | Mod: PBBFAC,PO,25 | Performed by: FAMILY MEDICINE

## 2018-04-04 PROCEDURE — 85025 COMPLETE CBC W/AUTO DIFF WBC: CPT | Mod: 91

## 2018-04-04 PROCEDURE — 99999 PR PBB SHADOW E&M-EST. PATIENT-LVL III: CPT | Mod: PBBFAC,,, | Performed by: FAMILY MEDICINE

## 2018-04-04 PROCEDURE — 25000003 PHARM REV CODE 250: Performed by: EMERGENCY MEDICINE

## 2018-04-04 PROCEDURE — 83880 ASSAY OF NATRIURETIC PEPTIDE: CPT

## 2018-04-04 PROCEDURE — 80053 COMPREHEN METABOLIC PANEL: CPT

## 2018-04-04 PROCEDURE — 85610 PROTHROMBIN TIME: CPT

## 2018-04-04 RX ORDER — ALPRAZOLAM 2 MG/1
2 TABLET ORAL 2 TIMES DAILY PRN
Qty: 60 TABLET | Refills: 0 | Status: SHIPPED | OUTPATIENT
Start: 2018-04-04 | End: 2018-05-09 | Stop reason: SDUPTHER

## 2018-04-04 RX ORDER — POTASSIUM CHLORIDE 7.45 MG/ML
10 INJECTION INTRAVENOUS
Status: COMPLETED | OUTPATIENT
Start: 2018-04-04 | End: 2018-04-05

## 2018-04-04 RX ORDER — POTASSIUM CHLORIDE 20 MEQ/1
60 TABLET, EXTENDED RELEASE ORAL
Status: COMPLETED | OUTPATIENT
Start: 2018-04-04 | End: 2018-04-04

## 2018-04-04 RX ORDER — NITROGLYCERIN 0.4 MG/1
0.4 TABLET SUBLINGUAL EVERY 5 MIN PRN
Status: DISCONTINUED | OUTPATIENT
Start: 2018-04-04 | End: 2018-04-06 | Stop reason: HOSPADM

## 2018-04-04 RX ORDER — NITROGLYCERIN 20 MG/100ML
5 INJECTION INTRAVENOUS CONTINUOUS
Status: DISCONTINUED | OUTPATIENT
Start: 2018-04-04 | End: 2018-04-06 | Stop reason: HOSPADM

## 2018-04-04 RX ORDER — ASPIRIN 325 MG
325 TABLET ORAL
Status: COMPLETED | OUTPATIENT
Start: 2018-04-04 | End: 2018-04-04

## 2018-04-04 RX ORDER — HEPARIN SODIUM,PORCINE/D5W 25000/250
17 INTRAVENOUS SOLUTION INTRAVENOUS CONTINUOUS
Status: DISCONTINUED | OUTPATIENT
Start: 2018-04-04 | End: 2018-04-05

## 2018-04-04 RX ADMIN — POTASSIUM CHLORIDE 60 MEQ: 1500 TABLET, EXTENDED RELEASE ORAL at 11:04

## 2018-04-04 RX ADMIN — ASPIRIN 325 MG ORAL TABLET 325 MG: 325 PILL ORAL at 10:04

## 2018-04-04 RX ADMIN — NITROGLYCERIN 0.4 MG: 0.4 TABLET SUBLINGUAL at 10:04

## 2018-04-04 RX ADMIN — POTASSIUM CHLORIDE 10 MEQ: 7.46 INJECTION, SOLUTION INTRAVENOUS at 11:04

## 2018-04-04 NOTE — PROGRESS NOTES
Subjective:       Patient ID: Griffin Hall is a 73 y.o. male.    Chief Complaint: Foot Swelling    Edema   This is a new problem. The current episode started today. The problem occurs constantly. The problem has been waxing and waning. Associated symptoms include myalgias. Pertinent negatives include no chest pain, fever, headaches, nausea, numbness, vomiting or weakness. The symptoms are aggravated by walking and bending. He has tried nothing for the symptoms.          Patient Active Problem List   Diagnosis    Dysphagia    Opioid type dependence, continuous use    History of myocardial infarction    History of head and neck cancer    Current smoker    Occlusive coronary artery disease    History of coronary artery stent placement    Heart failure, systolic, chronic    Depression    Anxiety    Cervical stenosis of spinal canal    Cervical radiculopathy    Chronic pain    Osteoarthritis of lumbosacral spine with radiculopathy       Current Outpatient Prescriptions:     albuterol (VENTOLIN HFA) 90 mcg/actuation inhaler, Inhale 2 puffs into the lungs every 6 (six) hours as needed for Wheezing., Disp: 1 Inhaler, Rfl: 6    ALPRAZolam (XANAX) 1 MG tablet, TAKE 1 TABLET (1 MG TOTAL) BY MOUTH 3 (THREE) TIMES DAILY., Disp: 90 tablet, Rfl: 0    aspirin (ECOTRIN) 81 MG EC tablet, Take 1 tablet (81 mg total) by mouth once daily., Disp: 90 tablet, Rfl: 3    atorvastatin (LIPITOR) 20 MG tablet, TAKE 1 TABLET (20 MG TOTAL) BY MOUTH ONCE DAILY., Disp: 90 tablet, Rfl: 3    benazepril (LOTENSIN) 5 MG tablet, Take 1 tablet (5 mg total) by mouth once daily., Disp: 90 tablet, Rfl: 3    carisoprodol (SOMA) 350 MG tablet, , Disp: , Rfl: 0    celecoxib (CELEBREX) 200 MG capsule, , Disp: , Rfl: 0    fluticasone (FLONASE) 50 mcg/actuation nasal spray, , Disp: , Rfl:     furosemide (LASIX) 20 MG tablet, Take 1 tablet (20 mg total) by mouth once daily., Disp: 90 tablet, Rfl: 3    metoprolol succinate (TOPROL-XL) 50  "MG 24 hr tablet, TAKE 1 TABLET BY MOUTH EVERY DAY, Disp: 90 tablet, Rfl: 3    nicotine (NICODERM CQ) 21 mg/24 hr, Place 1 patch onto the skin once daily., Disp: 30 patch, Rfl: 3    nicotine polacrilex 4 MG Lozg, Take 1 lozenge (4 mg total) by mouth as needed., Disp: 108 lozenge, Rfl: 3    oxyCODONE (ROXICODONE) 30 MG Tab, Take 1 tablet (30 mg total) by mouth 3 (three) times daily as needed., Disp: 90 tablet, Rfl: 0    The following portions of the patient's history were reviewed and updated as appropriate: allergies, past family history, past medical history, past social history and past surgical history    Review of Systems   Constitutional: Negative for fever.   Cardiovascular: Negative for chest pain and palpitations.   Gastrointestinal: Negative for nausea and vomiting.   Musculoskeletal: Positive for myalgias.   Neurological: Negative for weakness, numbness and headaches.   Psychiatric/Behavioral: The patient is nervous/anxious.          Objective:      Physical Exam   Constitutional: He is oriented to person, place, and time. He appears well-developed and well-nourished.   HENT:   Head: Normocephalic and atraumatic.   Musculoskeletal:        Left foot: There is tenderness (anterior ankle, midfoot, and great toe) and swelling. There is normal range of motion, no bony tenderness, no crepitus and no deformity.   Neurological: He is alert and oriented to person, place, and time.   Skin: Skin is warm and dry.   Psychiatric: He has a normal mood and affect.   Vitals reviewed.      Assessment:       1. Pedal edema    2. Foot pain, left    3. History of head and neck cancer        Plan:       Gout vs. Venous insufficiency?  Labs (see Orders).  Elevate LEs.  Consider support stockings.  We will call the patient with results & make further recommendations at that time.          "This note will not be shared with the patient."  "

## 2018-04-04 NOTE — TELEPHONE ENCOUNTER
"----- Message from Marlys Robles sent at 4/4/2018  1:42 PM CDT -----  Contact: patient himself  X  1st Request  _  2nd Request  _  3rd Request        Patient: Griffin Hall (mrn# [id number])     Why: Patient called and said, "there was an accident in front of him therefore he's running late; but intends to keep his appointment."  I did offer to reschedule because there's no maria del carmen period.   THANKS!    What Number to Call Back: [phone]                              "

## 2018-04-04 NOTE — PATIENT INSTRUCTIONS
Your test results will be communicated to you via : My [facility], Telephone or Letter.   If you have not received your test results in one week, please contact the clinic at [phone].

## 2018-04-05 ENCOUNTER — SURGERY (OUTPATIENT)
Age: 73
End: 2018-04-05

## 2018-04-05 ENCOUNTER — TELEPHONE (OUTPATIENT)
Dept: FAMILY MEDICINE | Facility: CLINIC | Age: 73
End: 2018-04-05

## 2018-04-05 LAB
ANION GAP SERPL CALC-SCNC: 11 MMOL/L
APTT BLDCRRT: 29.7 SEC
APTT BLDCRRT: 60.6 SEC
BASOPHILS # BLD AUTO: 0.02 K/UL
BASOPHILS NFR BLD: 0.4 %
BUN SERPL-MCNC: 6 MG/DL
CALCIUM SERPL-MCNC: 9 MG/DL
CHLORIDE SERPL-SCNC: 92 MMOL/L
CO2 SERPL-SCNC: 34 MMOL/L
CORONARY STENOSIS: ABNORMAL
CREAT SERPL-MCNC: 0.7 MG/DL
DIFFERENTIAL METHOD: ABNORMAL
EOSINOPHIL # BLD AUTO: 0.2 K/UL
EOSINOPHIL NFR BLD: 3.7 %
ERYTHROCYTE [DISTWIDTH] IN BLOOD BY AUTOMATED COUNT: 15.7 %
EST. GFR  (AFRICAN AMERICAN): >60 ML/MIN/1.73 M^2
EST. GFR  (NON AFRICAN AMERICAN): >60 ML/MIN/1.73 M^2
FACT X PPP CHRO-ACNC: 0.34 IU/ML
GLUCOSE SERPL-MCNC: 94 MG/DL
HCT VFR BLD AUTO: 37.4 %
HGB BLD-MCNC: 12.1 G/DL
INR PPP: 1
LYMPHOCYTES # BLD AUTO: 1.1 K/UL
LYMPHOCYTES NFR BLD: 20.2 %
MAGNESIUM SERPL-MCNC: 1.9 MG/DL
MCH RBC QN AUTO: 30.7 PG
MCHC RBC AUTO-ENTMCNC: 32.4 G/DL
MCV RBC AUTO: 95 FL
MONOCYTES # BLD AUTO: 0.5 K/UL
MONOCYTES NFR BLD: 9 %
NEUTROPHILS # BLD AUTO: 3.6 K/UL
NEUTROPHILS NFR BLD: 66.7 %
PHOSPHATE SERPL-MCNC: 2.9 MG/DL
PLATELET # BLD AUTO: 241 K/UL
PMV BLD AUTO: 10 FL
POC ACTIVATED CLOTTING TIME K: 147 SEC (ref 74–137)
POC ACTIVATED CLOTTING TIME K: 208 SEC (ref 74–137)
POTASSIUM SERPL-SCNC: 3.4 MMOL/L
PROTHROMBIN TIME: 11 SEC
RBC # BLD AUTO: 3.94 M/UL
SAMPLE: ABNORMAL
SAMPLE: ABNORMAL
SODIUM SERPL-SCNC: 137 MMOL/L
TROPONIN I SERPL DL<=0.01 NG/ML-MCNC: 0.01 NG/ML
TROPONIN I SERPL DL<=0.01 NG/ML-MCNC: 0.02 NG/ML
TROPONIN I SERPL DL<=0.01 NG/ML-MCNC: 0.02 NG/ML
WBC # BLD AUTO: 5.34 K/UL

## 2018-04-05 PROCEDURE — 25000003 PHARM REV CODE 250

## 2018-04-05 PROCEDURE — 85025 COMPLETE CBC W/AUTO DIFF WBC: CPT

## 2018-04-05 PROCEDURE — 94761 N-INVAS EAR/PLS OXIMETRY MLT: CPT

## 2018-04-05 PROCEDURE — 84100 ASSAY OF PHOSPHORUS: CPT

## 2018-04-05 PROCEDURE — 27100098 HC SPACER

## 2018-04-05 PROCEDURE — B2151ZZ FLUOROSCOPY OF LEFT HEART USING LOW OSMOLAR CONTRAST: ICD-10-PCS | Performed by: INTERNAL MEDICINE

## 2018-04-05 PROCEDURE — 85520 HEPARIN ASSAY: CPT

## 2018-04-05 PROCEDURE — 25000003 PHARM REV CODE 250: Performed by: INTERNAL MEDICINE

## 2018-04-05 PROCEDURE — 84484 ASSAY OF TROPONIN QUANT: CPT

## 2018-04-05 PROCEDURE — 63600175 PHARM REV CODE 636 W HCPCS: Performed by: EMERGENCY MEDICINE

## 2018-04-05 PROCEDURE — 94640 AIRWAY INHALATION TREATMENT: CPT

## 2018-04-05 PROCEDURE — S4991 NICOTINE PATCH NONLEGEND: HCPCS | Performed by: EMERGENCY MEDICINE

## 2018-04-05 PROCEDURE — 25000003 PHARM REV CODE 250: Performed by: EMERGENCY MEDICINE

## 2018-04-05 PROCEDURE — 99900035 HC TECH TIME PER 15 MIN (STAT)

## 2018-04-05 PROCEDURE — 25500020 PHARM REV CODE 255

## 2018-04-05 PROCEDURE — 25000242 PHARM REV CODE 250 ALT 637 W/ HCPCS: Performed by: EMERGENCY MEDICINE

## 2018-04-05 PROCEDURE — B2111ZZ FLUOROSCOPY OF MULTIPLE CORONARY ARTERIES USING LOW OSMOLAR CONTRAST: ICD-10-PCS | Performed by: INTERNAL MEDICINE

## 2018-04-05 PROCEDURE — 80048 BASIC METABOLIC PNL TOTAL CA: CPT

## 2018-04-05 PROCEDURE — 27000014 CATH LAB PROCEDURE

## 2018-04-05 PROCEDURE — 36415 COLL VENOUS BLD VENIPUNCTURE: CPT

## 2018-04-05 PROCEDURE — 4A023N7 MEASUREMENT OF CARDIAC SAMPLING AND PRESSURE, LEFT HEART, PERCUTANEOUS APPROACH: ICD-10-PCS | Performed by: INTERNAL MEDICINE

## 2018-04-05 PROCEDURE — 63600175 PHARM REV CODE 636 W HCPCS

## 2018-04-05 PROCEDURE — 27201224 CATH LAB PROCEDURE

## 2018-04-05 PROCEDURE — 96375 TX/PRO/DX INJ NEW DRUG ADDON: CPT

## 2018-04-05 PROCEDURE — 20000000 HC ICU ROOM

## 2018-04-05 PROCEDURE — 27000221 HC OXYGEN, UP TO 24 HOURS

## 2018-04-05 PROCEDURE — 83735 ASSAY OF MAGNESIUM: CPT

## 2018-04-05 PROCEDURE — 85730 THROMBOPLASTIN TIME PARTIAL: CPT

## 2018-04-05 RX ORDER — SODIUM CHLORIDE 9 MG/ML
INJECTION, SOLUTION INTRAVENOUS CONTINUOUS
Status: ACTIVE | OUTPATIENT
Start: 2018-04-05 | End: 2018-04-05

## 2018-04-05 RX ORDER — POTASSIUM CHLORIDE 20 MEQ/1
20 TABLET, EXTENDED RELEASE ORAL ONCE
Status: COMPLETED | OUTPATIENT
Start: 2018-04-05 | End: 2018-04-05

## 2018-04-05 RX ORDER — SODIUM CHLORIDE 0.9 % (FLUSH) 0.9 %
3 SYRINGE (ML) INJECTION
Status: DISCONTINUED | OUTPATIENT
Start: 2018-04-05 | End: 2018-04-06 | Stop reason: HOSPADM

## 2018-04-05 RX ORDER — DIPHENHYDRAMINE HCL 25 MG
25 CAPSULE ORAL
Status: CANCELLED | OUTPATIENT
Start: 2018-04-05

## 2018-04-05 RX ORDER — IBUPROFEN 200 MG
1 TABLET ORAL DAILY
Status: DISCONTINUED | OUTPATIENT
Start: 2018-04-05 | End: 2018-04-06 | Stop reason: HOSPADM

## 2018-04-05 RX ORDER — ATORVASTATIN CALCIUM 20 MG/1
20 TABLET, FILM COATED ORAL DAILY
Status: DISCONTINUED | OUTPATIENT
Start: 2018-04-05 | End: 2018-04-06 | Stop reason: HOSPADM

## 2018-04-05 RX ORDER — OXYCODONE HYDROCHLORIDE 5 MG/1
5 TABLET ORAL EVERY 4 HOURS PRN
Status: DISCONTINUED | OUTPATIENT
Start: 2018-04-05 | End: 2018-04-06 | Stop reason: HOSPADM

## 2018-04-05 RX ORDER — HYDROCODONE BITARTRATE AND ACETAMINOPHEN 10; 325 MG/1; MG/1
1 TABLET ORAL EVERY 4 HOURS PRN
Status: DISCONTINUED | OUTPATIENT
Start: 2018-04-05 | End: 2018-04-06 | Stop reason: HOSPADM

## 2018-04-05 RX ORDER — ALBUTEROL SULFATE 90 UG/1
2 AEROSOL, METERED RESPIRATORY (INHALATION) EVERY 6 HOURS PRN
Status: DISCONTINUED | OUTPATIENT
Start: 2018-04-05 | End: 2018-04-06 | Stop reason: HOSPADM

## 2018-04-05 RX ORDER — ASPIRIN 81 MG/1
81 TABLET ORAL DAILY
Status: DISCONTINUED | OUTPATIENT
Start: 2018-04-05 | End: 2018-04-06 | Stop reason: HOSPADM

## 2018-04-05 RX ORDER — ACETAMINOPHEN 325 MG/1
650 TABLET ORAL EVERY 4 HOURS PRN
Status: DISCONTINUED | OUTPATIENT
Start: 2018-04-05 | End: 2018-04-06 | Stop reason: HOSPADM

## 2018-04-05 RX ORDER — FUROSEMIDE 20 MG/1
20 TABLET ORAL DAILY
Status: DISCONTINUED | OUTPATIENT
Start: 2018-04-05 | End: 2018-04-05

## 2018-04-05 RX ORDER — RAMELTEON 8 MG/1
8 TABLET ORAL NIGHTLY PRN
Status: DISCONTINUED | OUTPATIENT
Start: 2018-04-05 | End: 2018-04-06 | Stop reason: HOSPADM

## 2018-04-05 RX ORDER — ALPRAZOLAM 0.5 MG/1
2 TABLET ORAL 2 TIMES DAILY PRN
Status: DISCONTINUED | OUTPATIENT
Start: 2018-04-05 | End: 2018-04-06 | Stop reason: HOSPADM

## 2018-04-05 RX ORDER — ATROPINE SULFATE 0.1 MG/ML
INJECTION INTRAVENOUS
Status: DISPENSED
Start: 2018-04-05 | End: 2018-04-06

## 2018-04-05 RX ORDER — SPIRONOLACTONE 25 MG/1
25 TABLET ORAL DAILY
Status: DISCONTINUED | OUTPATIENT
Start: 2018-04-05 | End: 2018-04-06 | Stop reason: HOSPADM

## 2018-04-05 RX ORDER — ONDANSETRON 8 MG/1
8 TABLET, ORALLY DISINTEGRATING ORAL EVERY 8 HOURS PRN
Status: DISCONTINUED | OUTPATIENT
Start: 2018-04-05 | End: 2018-04-06 | Stop reason: HOSPADM

## 2018-04-05 RX ORDER — CLOPIDOGREL BISULFATE 75 MG/1
75 TABLET ORAL DAILY
Status: DISCONTINUED | OUTPATIENT
Start: 2018-04-06 | End: 2018-04-06 | Stop reason: HOSPADM

## 2018-04-05 RX ORDER — METOPROLOL SUCCINATE 50 MG/1
50 TABLET, EXTENDED RELEASE ORAL DAILY
Status: DISCONTINUED | OUTPATIENT
Start: 2018-04-05 | End: 2018-04-06 | Stop reason: HOSPADM

## 2018-04-05 RX ORDER — CARISOPRODOL 350 MG/1
350 TABLET ORAL 3 TIMES DAILY
Status: DISCONTINUED | OUTPATIENT
Start: 2018-04-05 | End: 2018-04-06 | Stop reason: HOSPADM

## 2018-04-05 RX ORDER — POTASSIUM CHLORIDE 20 MEQ/1
40 TABLET, EXTENDED RELEASE ORAL ONCE
Status: COMPLETED | OUTPATIENT
Start: 2018-04-05 | End: 2018-04-05

## 2018-04-05 RX ORDER — CLOPIDOGREL 300 MG/1
600 TABLET, FILM COATED ORAL ONCE
Status: COMPLETED | OUTPATIENT
Start: 2018-04-05 | End: 2018-04-05

## 2018-04-05 RX ORDER — SODIUM CHLORIDE 9 MG/ML
INJECTION, SOLUTION INTRAVENOUS CONTINUOUS
Status: CANCELLED | OUTPATIENT
Start: 2018-04-05

## 2018-04-05 RX ORDER — BENAZEPRIL HYDROCHLORIDE 5 MG/1
5 TABLET ORAL DAILY
Status: DISCONTINUED | OUTPATIENT
Start: 2018-04-05 | End: 2018-04-06 | Stop reason: HOSPADM

## 2018-04-05 RX ORDER — OXYCODONE HYDROCHLORIDE 5 MG/1
30 TABLET ORAL 3 TIMES DAILY PRN
Status: DISCONTINUED | OUTPATIENT
Start: 2018-04-05 | End: 2018-04-06 | Stop reason: HOSPADM

## 2018-04-05 RX ADMIN — HEPARIN SODIUM AND DEXTROSE 17 UNITS/KG/HR: 10000; 5 INJECTION INTRAVENOUS at 12:04

## 2018-04-05 RX ADMIN — OXYCODONE HYDROCHLORIDE 30 MG: 5 TABLET ORAL at 09:04

## 2018-04-05 RX ADMIN — OXYCODONE HYDROCHLORIDE 30 MG: 5 TABLET ORAL at 07:04

## 2018-04-05 RX ADMIN — SPIRONOLACTONE 25 MG: 25 TABLET, FILM COATED ORAL at 12:04

## 2018-04-05 RX ADMIN — ALPRAZOLAM 2 MG: 0.5 TABLET ORAL at 09:04

## 2018-04-05 RX ADMIN — POTASSIUM CHLORIDE 40 MEQ: 1500 TABLET, EXTENDED RELEASE ORAL at 08:04

## 2018-04-05 RX ADMIN — OXYCODONE HYDROCHLORIDE 30 MG: 5 TABLET ORAL at 12:04

## 2018-04-05 RX ADMIN — CARISOPRODOL 350 MG: 350 TABLET ORAL at 04:04

## 2018-04-05 RX ADMIN — ALPRAZOLAM 2 MG: 0.5 TABLET ORAL at 02:04

## 2018-04-05 RX ADMIN — CARISOPRODOL 350 MG: 350 TABLET ORAL at 09:04

## 2018-04-05 RX ADMIN — NICOTINE 1 PATCH: 21 PATCH, EXTENDED RELEASE TRANSDERMAL at 12:04

## 2018-04-05 RX ADMIN — ATORVASTATIN CALCIUM 20 MG: 20 TABLET, FILM COATED ORAL at 12:04

## 2018-04-05 RX ADMIN — ALBUTEROL SULFATE 2 PUFF: 90 AEROSOL, METERED RESPIRATORY (INHALATION) at 07:04

## 2018-04-05 RX ADMIN — CLOPIDOGREL BISULFATE 600 MG: 300 TABLET, FILM COATED ORAL at 07:04

## 2018-04-05 RX ADMIN — ASPIRIN 81 MG: 81 TABLET, COATED ORAL at 01:04

## 2018-04-05 RX ADMIN — POTASSIUM CHLORIDE 20 MEQ: 1500 TABLET, EXTENDED RELEASE ORAL at 04:04

## 2018-04-05 RX ADMIN — BENAZEPRIL HYDROCHLORIDE 5 MG: 5 TABLET, COATED ORAL at 01:04

## 2018-04-05 NOTE — NURSING
Called Dr Briceño office for patient request of home med dosing of xanax and soma. Awaiting call back or orders.

## 2018-04-05 NOTE — TELEPHONE ENCOUNTER
I left a message with Dr. Mendez's staff, and with his nurse, concerning laboratory testing consistent with rheumatoid arthritis, and recommended rheumatology consultation for discharge.  Results routed to him as well.

## 2018-04-05 NOTE — H&P
[facility]  Cardiology  History and Physical     Patient Name: Griffin Hall  MRN: [id number]  Admission Date: 4/4/2018  Code Status: Full Code   Attending Provider: Garcia Mendez MD   Primary Care Physician: Jamarcus Sinha Jr, MD  Principal Problem:<principal problem not specified>    Patient information was obtained from patient and ER records.     Subjective:     Chief Complaint:  Chest Pain    HPI:     Griffin Hall is a 73 y.o. male patient who is a heavy smoker with a history of surgery for cancer of the neck in 2005. On 6/13/2016 he began having aching in his left shoulder and over his chest. The pain came and went and got more pronounced. He had given himself 4 days for it to ease up but as it persisted he decided to seek care at the [place] of [facility]. He ruled in for a NSTEMI and underwent cardiac catheterization on 6/17/2016 that revealed the mid left anterior descending coronary artery to have a mid 90% lesion. The left ventriculogram revealed a distal anteroseptal and apical akinesia. The mid LAD was stented with a NINA 3.0 x 18 mm. Following the procedure he had persitent aching in the left shoulder but no chest pain. On 8/1/2016 he had an Echo that revealed normal left ventricular size with low normal systolic function with an EF in the 50-55% range. The LA was mildly dilated. He has had no exertional chest pain but some exertional dyspnea. No palpitations or weak spells. Been feeling fair overall.    Now presents to the ER at [facility] after he began experience chest aching the night of 4/4/2018. On presentation he had new deep anterior T wave inversion. The pain resolved after several NTG sl tablets. He was admitted to ICU and has remained free from chest pain since presentation.    Past Medical History:   Diagnosis Date    Anxiety     Cancer     Coronary artery disease     Degenerative disc disease     Depression     Tobacco use 6/16/2016       Past Surgical History:   Procedure  Laterality Date    CERVICAL FUSION N/A     CORONARY ANGIOPLASTY WITH STENT PLACEMENT      HAND TENDON SURGERY      THROAT SURGERY  2005    TONSILLECTOMY         Review of patient's allergies indicates:   Allergen Reactions    Codeine Itching    Lyrica [pregabalin] Hallucinations       No current facility-administered medications on file prior to encounter.      Current Outpatient Prescriptions on File Prior to Encounter   Medication Sig    albuterol (VENTOLIN HFA) 90 mcg/actuation inhaler Inhale 2 puffs into the lungs every 6 (six) hours as needed for Wheezing.    ALPRAZolam (XANAX) 2 MG Tab Take 1 tablet (2 mg total) by mouth 2 (two) times daily as needed for Anxiety.    aspirin (ECOTRIN) 81 MG EC tablet Take 1 tablet (81 mg total) by mouth once daily.    atorvastatin (LIPITOR) 20 MG tablet TAKE 1 TABLET (20 MG TOTAL) BY MOUTH ONCE DAILY.    carisoprodol (SOMA) 350 MG tablet     fluticasone (FLONASE) 50 mcg/actuation nasal spray     furosemide (LASIX) 20 MG tablet Take 1 tablet (20 mg total) by mouth once daily.    nicotine (NICODERM CQ) 21 mg/24 hr Place 1 patch onto the skin once daily.    oxyCODONE (ROXICODONE) 30 MG Tab Take 1 tablet (30 mg total) by mouth 3 (three) times daily as needed.    benazepril (LOTENSIN) 5 MG tablet Take 1 tablet (5 mg total) by mouth once daily.    celecoxib (CELEBREX) 200 MG capsule     metoprolol succinate (TOPROL-XL) 50 MG 24 hr tablet TAKE 1 TABLET BY MOUTH EVERY DAY    nicotine polacrilex 4 MG Lozg Take 1 lozenge (4 mg total) by mouth as needed.     Family History     Problem Relation (Age of Onset)    Cancer Maternal Aunt, Maternal Uncle, Paternal Uncle    Dementia Sister    Lung cancer Father    Stomach cancer Mother    Testicular cancer Brother        Social History Main Topics    Smoking status: Current Every Day Smoker     Packs/day: 2.00     Years: 50.00     Types: Cigarettes     Start date: 3/15/1967    Smokeless tobacco: Never Used      Comment:  7/2016 1ppd, down from 2ppd    Alcohol use No    Drug use: No    Sexual activity: Yes     Partners: Male     Review of Systems   Constitution: Negative for chills, fever, weakness and malaise/fatigue.   HENT: Negative for nosebleeds.    Eyes: Negative for double vision, vision loss in left eye and vision loss in right eye.   Cardiovascular: Positive for chest pain and leg swelling. Negative for claudication, dyspnea on exertion, irregular heartbeat, near-syncope, orthopnea, palpitations, paroxysmal nocturnal dyspnea and syncope.   Respiratory: Negative for cough, hemoptysis, shortness of breath and wheezing.    Endocrine: Negative for cold intolerance and heat intolerance.   Hematologic/Lymphatic: Negative for bleeding problem. Does not bruise/bleed easily.   Skin: Negative for color change and rash.   Musculoskeletal: Negative for back pain, falls, muscle weakness and myalgias.   Gastrointestinal: Negative for heartburn, hematemesis, hematochezia, hemorrhoids, jaundice, melena, nausea and vomiting.   Genitourinary: Negative for dysuria and hematuria.   Neurological: Negative for dizziness, focal weakness, headaches, light-headedness, loss of balance, numbness and vertigo.   Psychiatric/Behavioral: Negative for altered mental status, depression and memory loss. The patient is not nervous/anxious.    Allergic/Immunologic: Negative for hives and persistent infections.     Objective:     Vital Signs (Most Recent):  Temp: 98.1 °F (36.7 °C) (04/05/18 0305)  Pulse: (!) 52 (04/05/18 0700)  Resp: 12 (04/05/18 0700)  BP: (!) 103/59 (04/05/18 0700)  SpO2: 97 % (04/05/18 0700) Vital Signs (24h Range):  Temp:  [98.1 °F (36.7 °C)-98.6 °F (37 °C)] 98.1 °F (36.7 °C)  Pulse:  [52-70] 52  Resp:  [11-36] 12  SpO2:  [88 %-98 %] 97 %  BP: ()/(49-83) 103/59     Weight: 59.6 kg (131 lb 6.3 oz)  Body mass index is 18.33 kg/m².    SpO2: 97 %  O2 Device (Oxygen Therapy): nasal cannula      Intake/Output Summary (Last 24 hours) at  04/05/18 0911  Last data filed at 04/05/18 0738   Gross per 24 hour   Intake            103.5 ml   Output             2375 ml   Net          -2271.5 ml       Lines/Drains/Airways     Peripheral Intravenous Line                 Peripheral IV - Single Lumen 04/04/18 2219 Left Antecubital less than 1 day         Peripheral IV - Single Lumen 04/04/18 2332 Right Antecubital less than 1 day                Physical Exam   Constitutional: He is oriented to person, place, and time. He appears well-developed and well-nourished.  Non-toxic appearance. No distress.   HENT:   Head: Normocephalic and atraumatic.   Nose: Nose normal.   Eyes: Right eye exhibits no discharge. Left eye exhibits no discharge. Right conjunctiva is not injected. Left conjunctiva is not injected. Right pupil is round. Left pupil is round. Pupils are equal.   Neck: Neck supple. No JVD present. Carotid bruit is not present. No thyromegaly present.   Cardiovascular: Normal rate, regular rhythm, S1 normal and S2 normal.   No extrasystoles are present. PMI is not displaced.  Exam reveals gallop and S4.    Murmur heard.  Pulses:       Radial pulses are 2+ on the right side, and 2+ on the left side.        Femoral pulses are 2+ on the right side, and 2+ on the left side.       Dorsalis pedis pulses are 2+ on the right side, and 2+ on the left side.        Posterior tibial pulses are 2+ on the right side, and 2+ on the left side.   Pulmonary/Chest: Effort normal. He has rhonchi.   Abdominal: Soft. Normal appearance. There is no hepatosplenomegaly. There is no tenderness.   Musculoskeletal:        Right ankle: He exhibits no swelling, no ecchymosis and no deformity.        Left ankle: He exhibits swelling. He exhibits no ecchymosis and no deformity.   Lymphadenopathy:        Head (right side): No submandibular adenopathy present.        Head (left side): No submandibular adenopathy present.     He has no cervical adenopathy.   Neurological: He is alert and  oriented to person, place, and time. He is not disoriented. No cranial nerve deficit.   Skin: Skin is warm, dry and intact. No rash noted. He is not diaphoretic. No cyanosis. Nails show no clubbing.   Psychiatric: He has a normal mood and affect. His speech is normal and behavior is normal. Judgment and thought content normal. Cognition and memory are normal.     Current Medications:     atorvastatin  20 mg Oral Daily    [START ON 4/6/2018] clopidogrel  75 mg Oral Daily    metoprolol succinate  50 mg Oral Daily    nicotine  1 patch Transdermal Daily    potassium chloride  40 mEq Oral Once     Current Laboratory Results:    Recent Results (from the past 24 hour(s))   Comprehensive metabolic panel    Collection Time: 04/04/18  2:42 PM   Result Value Ref Range    Sodium 136 136 - 145 mmol/L    Potassium 2.7 (LL) 3.5 - 5.1 mmol/L    Chloride 85 (L) 95 - 110 mmol/L    CO2 38 (H) 23 - 29 mmol/L    Glucose 86 70 - 110 mg/dL    BUN, Bld 8 8 - 23 mg/dL    Creatinine 0.8 0.5 - 1.4 mg/dL    Calcium 9.3 8.7 - 10.5 mg/dL    Total Protein 8.1 6.0 - 8.4 g/dL    Albumin 3.5 3.5 - 5.2 g/dL    Total Bilirubin 0.3 0.1 - 1.0 mg/dL    Alkaline Phosphatase 155 (H) 55 - 135 U/L    AST 25 10 - 40 U/L    ALT 19 10 - 44 U/L    Anion Gap 13 8 - 16 mmol/L    eGFR if African American >60.0 >60 mL/min/1.73 m^2    eGFR if non African American >60.0 >60 mL/min/1.73 m^2   Uric acid    Collection Time: 04/04/18  2:42 PM   Result Value Ref Range    Uric Acid 4.8 3.4 - 7.0 mg/dL   CBC auto differential    Collection Time: 04/04/18  2:42 PM   Result Value Ref Range    WBC 5.18 3.90 - 12.70 K/uL    RBC 4.26 (L) 4.60 - 6.20 M/uL    Hemoglobin 13.0 (L) 14.0 - 18.0 g/dL    Hematocrit 41.3 40.0 - 54.0 %    MCV 97 82 - 98 fL    MCH 30.5 27.0 - 31.0 pg    MCHC 31.5 (L) 32.0 - 36.0 g/dL    RDW 15.8 (H) 11.5 - 14.5 %    Platelets 273 150 - 350 K/uL    MPV 10.7 9.2 - 12.9 fL    Immature Granulocytes 0.2 0.0 - 0.5 %    Gran # (ANC) 4.1 1.8 - 7.7 K/uL     Immature Grans (Abs) 0.01 0.00 - 0.04 K/uL    Lymph # 0.6 (L) 1.0 - 4.8 K/uL    Mono # 0.4 0.3 - 1.0 K/uL    Eos # 0.1 0.0 - 0.5 K/uL    Baso # 0.04 0.00 - 0.20 K/uL    nRBC 0 0 /100 WBC    Gran% 78.9 (H) 38.0 - 73.0 %    Lymph% 11.2 (L) 18.0 - 48.0 %    Mono% 7.7 4.0 - 15.0 %    Eosinophil% 1.2 0.0 - 8.0 %    Basophil% 0.8 0.0 - 1.9 %    Differential Method Automated    Sedimentation rate, manual    Collection Time: 04/04/18  2:42 PM   Result Value Ref Range    Sed Rate 29 (H) 0 - 10 mm/Hr   C-reactive protein    Collection Time: 04/04/18  2:42 PM   Result Value Ref Range    CRP 35.8 (H) 0.0 - 8.2 mg/L   CBC auto differential    Collection Time: 04/04/18 10:18 PM   Result Value Ref Range    WBC 4.79 3.90 - 12.70 K/uL    RBC 4.15 (L) 4.60 - 6.20 M/uL    Hemoglobin 12.7 (L) 14.0 - 18.0 g/dL    Hematocrit 39.4 (L) 40.0 - 54.0 %    MCV 95 82 - 98 fL    MCH 30.6 27.0 - 31.0 pg    MCHC 32.2 32.0 - 36.0 g/dL    RDW 15.8 (H) 11.5 - 14.5 %    Platelets 246 150 - 350 K/uL    MPV 10.2 9.2 - 12.9 fL    Gran # (ANC) 3.2 1.8 - 7.7 K/uL    Lymph # 1.0 1.0 - 4.8 K/uL    Mono # 0.5 0.3 - 1.0 K/uL    Eos # 0.1 0.0 - 0.5 K/uL    Baso # 0.03 0.00 - 0.20 K/uL    Gran% 66.0 38.0 - 73.0 %    Lymph% 19.8 18.0 - 48.0 %    Mono% 10.9 4.0 - 15.0 %    Eosinophil% 2.7 0.0 - 8.0 %    Basophil% 0.6 0.0 - 1.9 %    Differential Method Automated    Comprehensive metabolic panel    Collection Time: 04/04/18 10:18 PM   Result Value Ref Range    Sodium 136 136 - 145 mmol/L    Potassium 2.6 (LL) 3.5 - 5.1 mmol/L    Chloride 85 (L) 95 - 110 mmol/L    CO2 41 (HH) 23 - 29 mmol/L    Glucose 122 (H) 70 - 110 mg/dL    BUN, Bld 8 8 - 23 mg/dL    Creatinine 0.8 0.5 - 1.4 mg/dL    Calcium 9.5 8.7 - 10.5 mg/dL    Total Protein 7.9 6.0 - 8.4 g/dL    Albumin 3.6 3.5 - 5.2 g/dL    Total Bilirubin 0.3 0.1 - 1.0 mg/dL    Alkaline Phosphatase 141 (H) 55 - 135 U/L    AST 25 10 - 40 U/L    ALT 21 10 - 44 U/L    Anion Gap 10 8 - 16 mmol/L    eGFR if African American >60  >60 mL/min/1.73 m^2    eGFR if non African American >60 >60 mL/min/1.73 m^2   Troponin I #1    Collection Time: 04/04/18 10:18 PM   Result Value Ref Range    Troponin I 0.014 0.000 - 0.026 ng/mL   B-Type natriuretic peptide (BNP)    Collection Time: 04/04/18 10:18 PM   Result Value Ref Range    BNP 96 0 - 99 pg/mL   APTT    Collection Time: 04/04/18 11:47 PM   Result Value Ref Range    aPTT 29.7 21.0 - 32.0 sec   Protime-INR    Collection Time: 04/04/18 11:47 PM   Result Value Ref Range    Prothrombin Time 11.0 9.0 - 12.5 sec    INR 1.0 0.8 - 1.2   Platelet count    Collection Time: 04/04/18 11:47 PM   Result Value Ref Range    Platelets 240 150 - 350 K/uL    MPV 10.1 9.2 - 12.9 fL   CBC auto differential    Collection Time: 04/04/18 11:47 PM   Result Value Ref Range    WBC 4.76 3.90 - 12.70 K/uL    RBC 4.03 (L) 4.60 - 6.20 M/uL    Hemoglobin 12.3 (L) 14.0 - 18.0 g/dL    Hematocrit 38.6 (L) 40.0 - 54.0 %    MCV 96 82 - 98 fL    MCH 30.5 27.0 - 31.0 pg    MCHC 31.9 (L) 32.0 - 36.0 g/dL    RDW 15.8 (H) 11.5 - 14.5 %    Platelets 240 150 - 350 K/uL    MPV 10.1 9.2 - 12.9 fL    Gran # (ANC) 3.2 1.8 - 7.7 K/uL    Lymph # 1.1 1.0 - 4.8 K/uL    Mono # 0.4 0.3 - 1.0 K/uL    Eos # 0.1 0.0 - 0.5 K/uL    Baso # 0.03 0.00 - 0.20 K/uL    Gran% 67.4 38.0 - 73.0 %    Lymph% 22.1 18.0 - 48.0 %    Mono% 8.0 4.0 - 15.0 %    Eosinophil% 1.9 0.0 - 8.0 %    Basophil% 0.6 0.0 - 1.9 %    Differential Method Automated    Troponin I #2    Collection Time: 04/05/18  1:08 AM   Result Value Ref Range    Troponin I 0.017 0.000 - 0.026 ng/mL   CBC auto differential    Collection Time: 04/05/18  3:40 AM   Result Value Ref Range    WBC 5.34 3.90 - 12.70 K/uL    RBC 3.94 (L) 4.60 - 6.20 M/uL    Hemoglobin 12.1 (L) 14.0 - 18.0 g/dL    Hematocrit 37.4 (L) 40.0 - 54.0 %    MCV 95 82 - 98 fL    MCH 30.7 27.0 - 31.0 pg    MCHC 32.4 32.0 - 36.0 g/dL    RDW 15.7 (H) 11.5 - 14.5 %    Platelets 241 150 - 350 K/uL    MPV 10.0 9.2 - 12.9 fL    Gran # (ANC)  3.6 1.8 - 7.7 K/uL    Lymph # 1.1 1.0 - 4.8 K/uL    Mono # 0.5 0.3 - 1.0 K/uL    Eos # 0.2 0.0 - 0.5 K/uL    Baso # 0.02 0.00 - 0.20 K/uL    Gran% 66.7 38.0 - 73.0 %    Lymph% 20.2 18.0 - 48.0 %    Mono% 9.0 4.0 - 15.0 %    Eosinophil% 3.7 0.0 - 8.0 %    Basophil% 0.4 0.0 - 1.9 %    Differential Method Automated    Basic metabolic panel    Collection Time: 04/05/18  3:40 AM   Result Value Ref Range    Sodium 137 136 - 145 mmol/L    Potassium 3.4 (L) 3.5 - 5.1 mmol/L    Chloride 92 (L) 95 - 110 mmol/L    CO2 34 (H) 23 - 29 mmol/L    Glucose 94 70 - 110 mg/dL    BUN, Bld 6 (L) 8 - 23 mg/dL    Creatinine 0.7 0.5 - 1.4 mg/dL    Calcium 9.0 8.7 - 10.5 mg/dL    Anion Gap 11 8 - 16 mmol/L    eGFR if African American >60 >60 mL/min/1.73 m^2    eGFR if non African American >60 >60 mL/min/1.73 m^2   Magnesium    Collection Time: 04/05/18  3:40 AM   Result Value Ref Range    Magnesium 1.9 1.6 - 2.6 mg/dL   Phosphorus    Collection Time: 04/05/18  3:40 AM   Result Value Ref Range    Phosphorus 2.9 2.7 - 4.5 mg/dL   APTT    Collection Time: 04/05/18  5:22 AM   Result Value Ref Range    aPTT 60.6 (H) 21.0 - 32.0 sec   Troponin I    Collection Time: 04/05/18  5:22 AM   Result Value Ref Range    Troponin I 0.012 0.000 - 0.026 ng/mL     Current Imaging Results:    Imaging Results          X-Ray Chest AP Portable (Final result)  Result time 04/04/18 23:26:32   Procedure changed from X-Ray Chest PA And Lateral     Final result by Sheldon Max MD (04/04/18 23:26:32)                 Impression:      Significantly limited exam secondary to bulky calcified pleural plaques, suggesting prior asbestos exposure.  No obvious detrimental change.      Electronically signed by: Sheldon Max MD  Date:    04/04/2018  Time:    23:26             Narrative:    EXAMINATION:  XR CHEST AP PORTABLE    CLINICAL HISTORY:  Chest Pain;    TECHNIQUE:  Single frontal view of the chest was performed.    COMPARISON:  CT chest, 06/16/2016.  Chest  radiograph, 04/18/2017.    FINDINGS:  Cardiac wires overlie the chest.  Cardiac silhouette is not enlarged.  There are multiple large bulky calcified pleural plaques identified bilaterally, similar in appearance to the prior exam.  Many of these pleural plaques have a prominent soft tissue component as seen on prior CT.  These findings significantly limit evaluation of the underlying lung parenchyma.  No obvious detrimental change is identified when compared with the prior exam.                              ECG: T wave inversion anterior leads.    Assessment and Plan:     Problem List:      Active Diagnoses:    Diagnosis Date Noted POA    Hypokalemia [E87.6] 04/04/2018 Yes      Problems Resolved During this Admission:    Diagnosis Date Noted Date Resolved POA     Assessment and Plan:    1. Coronary Artery Disease              6/16/2016: NSTEMI. Troponin 0.3.              Anterior T wave inversion.              6/17/2016: [facility] [place]: Cath: LAD: mid 90%. LV: Distal anteroseptal and apical akinesia. NINA 3.0 x 18 mm.              6/17/2016: Chol 111. HDL 62. LDL 52. TG 62.              Atorvastatin 20 mg Q24.                Aspirin 81 mg Q24 indefinitely.   4/4/2018: Presents with chest pain at rest.              4/5/2018: Cath.     2. Heart Failure, Systolic, Chronic              6/17/2016: [facility]: Cath: LV: distal anteroseptal and apical akinesia. EF 35%.              6/17/2016: Echo: Left ventricular size upper normal with moderate systolic dysfunction. EF 35-40%. Anteroseptal/apical WMA.              8/1/2016: Echo: Normal left ventricular size with low normal systolic function. EF 50-55%. Mildly dilated LA.              Probably stunning as troponin so low.              On metoprolol 50 mg Q24, benazepril 5 mg Q24 and furosemide 20 mg Q24 Q24.              Appears well compensated.   Add spironolactone with hypokalemia.     3. Current Smoker              1967: Began smoking.              Unable to quit.              Been  heavy smoker.              Encouraged to quit.              Rx patches and gums through [facility].                 4. History of Neck Cancer              2005: Radical neck surgery.     5. Dysphagia              Mild after radical neck dissection.     6. Shoulder Pain              Appears to have musculoskeletal cause.     7. Primary Care              In transition.     The planned procedure was discussed in detail with the patient and the family members present. Risk, benefit and alternatives were reviewed. All questions answered. Consent was then signed.    If further questions or concerns arise the patient was encouraged to contact me prior to the planned procedure.       VTE Risk Mitigation         Ordered     IP VTE HIGH RISK PATIENT  Once      04/05/18 0905     Place sequential compression device  Until discontinued      04/05/18 0035     Place BEATA hose  Until discontinued      04/05/18 0035          Garcia Mendez MD  Cardiology   [facility]

## 2018-04-05 NOTE — ED NOTES
Pt tolerated oral potassium moderately well, but complained of difficulty getting large pills down. Pt has Hx of Cx and radiation to neck. Pt needed lots of liquids to get pills down.
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
## 2018-04-05 NOTE — ED NOTES
After giving pt 1 SL nitro pt reporting some chest pain relief, pain now a 2/10. Pt starting reporting dizziness, repeat was 93. Will hold second dose of nitro at this time. Xray at bedside for portable xray.

## 2018-04-05 NOTE — ED NOTES
Pt transported to ICU by RN x 2 on portable cardiac monitor and continuous pulse ox. No change in patient status during transport. Upon arrival pt stated that his knee pain was subsiding.

## 2018-04-05 NOTE — ED NOTES
When discussing history with patient and daughter pt also reporting 10-lb weight loss x 2 weeks, fatigue, increased sore throat with swallowing difficulties x 4 days. PMH of throat cancer. Pt able to maintain secretion, no oral swelling noted.

## 2018-04-05 NOTE — BRIEF OP NOTE
4/5/2018: [facility] [place]: LAD: Proximal 50%. iFR 0.94. Mid: Stent patent. LV: Anterolateral wall mild hypokinesia. EF 50%.    Garcia Mendez M.D.

## 2018-04-05 NOTE — PLAN OF CARE
"PCP confirmed with pt, states he likes his PCP and will continue to follow with him.    Pharmacy confirmed: [facility] or [facility].  Pt says he goes wherever someone will take him.    Pt not agreeable with initial discharge assessment.  Refuses to answer most questions saying "I am not comfortable telling you that."      Attempted to reach emergency contact, daughter Marley.  Awaiting call back.       04/05/18 1425   Discharge Assessment   Assessment Type Discharge Planning Assessment   Confirmed/corrected address and phone number on facesheet? Yes   Assessment information obtained from? Patient   Communicated expected length of stay with patient/caregiver yes   Prior to hospitilization cognitive status: Alert/Oriented   Prior to hospitalization functional status: Independent   Current cognitive status: Alert/Oriented   Current Functional Status: Independent   Lives With other (see comments)  (pt states "im not comfortable telling you that.")   Able to Return to Prior Arrangements yes   Is patient able to care for self after discharge? Yes   Patient's perception of discharge disposition home or selfcare   Readmission Within The Last 30 Days no previous admission in last 30 days   Patient currently being followed by outpatient case management? No   Patient currently receives any other outside agency services? No   Equipment Currently Used at Home other (see comments)  (pt states "I dont need to tell you that either.")   Does the patient have transportation home? No   Patient/Family In Agreement With Plan yes     "

## 2018-04-05 NOTE — ED TRIAGE NOTES
"Pt reports to ED with c/o chest pain, swelling of bilateral feet, right arm pain and an abnormal lab value from a dr office visit today. Pt states his feet started swelling today and he had trouble putting on his shoes. 2+ pitting edema noted to bilateral ankles. Pt states "they elisa 6 vials of blood today at the doctor." Pt reports receiving a call from the dr office telling him he should come to the ED for evaluation because of a "very low" potassium level. Pt unable to remember value reported to him on phone. Pt reports right arm numbness and left sided chest pain that radiated to his back beginning following the phone call. Pt denies N/V or dizziness. Pt connected to cardiac monitor and continuous pulse ox, BP cycling. Bed locked and in lowest position, side rails x 2, call light placed in reach. Family member at bedside.  "

## 2018-04-05 NOTE — NURSING
Paged Dr Mendez to see if patient can stay in ICU for the remainder of his stay until possible DC tomorrow due to patient non compliance with care and for patient and staff safety. Orders obtained.

## 2018-04-05 NOTE — ED NOTES
"Pt complaining of right knee pain. Exposed extremity, no redness or swelling noted. Pt stated "It feels like a matt horse". Pt repositioned in bed and leg straightened to provide positional relief.   "

## 2018-04-05 NOTE — ED PROVIDER NOTES
Encounter Date: 4/4/2018    SCRIBE #1 NOTE: I, Nancy Marin, am scribing for, and in the presence of, Dr. Draper.       History     Chief Complaint   Patient presents with    Chest Pain     w/ numbness from R hand to R elbow x 6 hrs, similair pain to when he had his cath, +SOB, dizzy and light- headedness, no N/V/D     Time seen by provider: 10:06 PM    This is a 73 y.o. male who presents with complaint of chest pain that began after leaving clinic approximately 6 hours. The pain began after his doctor told him to go to the ED for hypokalemia, and he notes pain may be due to anxiety. He reports fatigue, SOB for three days, bilateral feet and leg swelling today, and right hand numbness today. He denies fever, chills, palpitations, abdominal pain, headache, or weakness. He reports history of heart attack and stent placement two years ago. He denies experiencing chest pain since his heart attack. He last saw Dr. Mendez one month ago.      The history is provided by the patient.     Review of patient's allergies indicates:   Allergen Reactions    Codeine Itching    Lyrica [pregabalin] Hallucinations     Past Medical History:   Diagnosis Date    Anxiety     Cancer     Coronary artery disease     Degenerative disc disease     Depression     Tobacco use 6/16/2016     Past Surgical History:   Procedure Laterality Date    CERVICAL FUSION N/A     CORONARY ANGIOPLASTY WITH STENT PLACEMENT      HAND TENDON SURGERY      THROAT SURGERY  2005    TONSILLECTOMY       Family History   Problem Relation Age of Onset    Stomach cancer Mother     Lung cancer Father     Cancer Maternal Aunt     Cancer Maternal Uncle     Cancer Paternal Uncle     Dementia Sister     Testicular cancer Brother      Social History   Substance Use Topics    Smoking status: Current Every Day Smoker     Packs/day: 2.00     Years: 50.00     Types: Cigarettes     Start date: 3/15/1967    Smokeless tobacco: Never Used      Comment: 7/2016  1ppd, down from 2ppd    Alcohol use No     Review of Systems   Constitutional: Positive for fatigue. Negative for fever.   HENT: Negative for sore throat.    Respiratory: Positive for shortness of breath. Negative for cough.    Cardiovascular: Positive for chest pain and leg swelling. Negative for palpitations.   Gastrointestinal: Negative for abdominal pain, nausea and vomiting.   Genitourinary: Negative for dysuria.   Musculoskeletal: Negative for back pain.        Positive for foot swelling.   Skin: Negative for rash.   Neurological: Positive for numbness (hand). Negative for weakness and headaches.   Hematological: Does not bruise/bleed easily.   Psychiatric/Behavioral: The patient is nervous/anxious.        Physical Exam     Initial Vitals [04/04/18 2147]   BP Pulse Resp Temp SpO2   107/64 65 20 98.6 °F (37 °C) 95 %      MAP       78.33         Physical Exam    Nursing note and vitals reviewed.  Constitutional: He appears well-developed and well-nourished. He is not diaphoretic. No distress.   HENT:   Head: Normocephalic and atraumatic.   Eyes: Conjunctivae and EOM are normal. No scleral icterus.   Neck: Normal range of motion. Neck supple.   Cardiovascular: Normal rate, regular rhythm and normal heart sounds. Exam reveals no gallop and no friction rub.    No murmur heard.  Pulmonary/Chest: Breath sounds normal. No respiratory distress. He has no wheezes. He has no rhonchi. He has no rales.   Abdominal: Soft. Bowel sounds are normal. He exhibits no distension. There is no tenderness. There is no rebound and no guarding.   Musculoskeletal: Normal range of motion. He exhibits no tenderness.   1+ pitting edema to bilateral feet and ankles to mid shaft of the tibia.   Lymphadenopathy:     He has no cervical adenopathy.   Neurological: He is alert and oriented to person, place, and time.   Skin: Skin is warm and dry. No rash and no abscess noted. No erythema. No pallor.         ED Course   Critical Care  Date/Time:  4/4/2018 11:49 PM  Performed by: ROSA WILLAMS  Authorized by: ROSA WILLAMS   Direct patient critical care time: 7 minutes  Additional history critical care time: 6 minutes  Ordering / reviewing critical care time: 6 minutes  Documentation critical care time: 6 minutes  Consulting other physicians critical care time: 5 minutes  Consult with family critical care time: 5 minutes  Total critical care time (exclusive of procedural time) : 35 minutes  Critical care time was exclusive of separately billable procedures and treating other patients and teaching time.  Critical care was necessary to treat or prevent imminent or life-threatening deterioration of the following conditions: metabolic crisis.  Critical care was time spent personally by me on the following activities: evaluation of patient's response to treatment, examination of patient, obtaining history from patient or surrogate, ordering and performing treatments and interventions, ordering and review of laboratory studies, ordering and review of radiographic studies, re-evaluation of patient's condition and review of old charts.        Labs Reviewed   CBC W/ AUTO DIFFERENTIAL - Abnormal; Notable for the following:        Result Value    RBC 4.15 (*)     Hemoglobin 12.7 (*)     Hematocrit 39.4 (*)     RDW 15.8 (*)     All other components within normal limits   COMPREHENSIVE METABOLIC PANEL - Abnormal; Notable for the following:     Potassium 2.6 (*)     Chloride 85 (*)     CO2 41 (*)     Glucose 122 (*)     Alkaline Phosphatase 141 (*)     All other components within normal limits    Narrative:     K and CO2 critical result(s) called and verbal readback obtained from   Brittany Alvarado RN, 04/04/2018 22:51   TROPONIN I   B-TYPE NATRIURETIC PEPTIDE   TROPONIN I   APTT   PROTIME-INR   PLATELET COUNT   CBC W/ AUTO DIFFERENTIAL     Imaging Results          X-Ray Chest AP Portable (Final result)  Result time 04/04/18 23:26:32   Procedure changed from X-Ray Chest PA  And Lateral     Final result by Sheldon Max MD (04/04/18 23:26:32)                 Impression:      Significantly limited exam secondary to bulky calcified pleural plaques, suggesting prior asbestos exposure.  No obvious detrimental change.      Electronically signed by: Sheldon Max MD  Date:    04/04/2018  Time:    23:26             Narrative:    EXAMINATION:  XR CHEST AP PORTABLE    CLINICAL HISTORY:  Chest Pain;    TECHNIQUE:  Single frontal view of the chest was performed.    COMPARISON:  CT chest, 06/16/2016.  Chest radiograph, 04/18/2017.    FINDINGS:  Cardiac wires overlie the chest.  Cardiac silhouette is not enlarged.  There are multiple large bulky calcified pleural plaques identified bilaterally, similar in appearance to the prior exam.  Many of these pleural plaques have a prominent soft tissue component as seen on prior CT.  These findings significantly limit evaluation of the underlying lung parenchyma.  No obvious detrimental change is identified when compared with the prior exam.                              EKG Readings: (Independently Interpreted)   2249: Normal sinus rhythm with PACs at a rate of 64 bpm. No STEMI. Deep swooping T wave inversions in V3 and V4, which are new compared to EKG on 4/18/17, but appear similar to EKG on 10/2016.       X-Rays:   Independently Interpreted Readings:   Chest X-Ray: Significant chronic changes. No new effusions or opacities.     Medical Decision Making:   Clinical Tests:   Lab Tests: Ordered and Reviewed  Radiological Study: Reviewed and Ordered  Medical Tests: Ordered and Reviewed  ED Management:  Elderly patient presents after being referred in by his primary care doctor for hypokalemia.  Patient also reports chest pain started a few hours ago.  Pain reminds him of previous heart attack.  EKG is different from priors with sweeping T waves in V3 and V4.  Certainly some T-wave inversions are expected with hypokalemia, but these are isolated to the  anterior leads and not widespread.  Reviewing his chart, he did have similar sweeping T waves with his previous heart attack.  There is no ST segment elevation on EKG.  This is not STEMI at this time.  Chest pain resolves with sublingual nitroglycerin.  Hypokalemia is 2.6 on repeat lab.  Unclear the etiology of hypokalemia.  Patient is on furosemide. Beginning IV and oral K+ repletion.  Discussed with the patient's cardiologist Dr. Mendez, who recommends IV nitroglycerin glycerin drip if patient not chest pain-free, IV heparin, and monitoring in the ICU.  On reevaluation, patient remains chest pain-free.  Beginning heparin drip, holding nitroglycerin drip.    JODI Draper M.D.  04/05/2018  12:05 AM      11:02 PM - Paged Dr. Mendez.   11:16 PM - Discussed case with Dr. Mendez.            Scribe Attestation:   Scribe #1: I performed the above scribed service and the documentation accurately describes the services I performed. I attest to the accuracy of the note.    Attending Attestation:           Physician Attestation for Scribe:  Physician Attestation Statement for Scribe #1: I, Dr. Draper, reviewed documentation, as scribed by Nancy Marin in my presence, and it is both accurate and complete.                    Clinical Impression:     1. Hypokalemia    2. Chest pain                               Napoleon Draper MD  04/05/18 0005

## 2018-04-06 VITALS
BODY MASS INDEX: 18.39 KG/M2 | DIASTOLIC BLOOD PRESSURE: 59 MMHG | SYSTOLIC BLOOD PRESSURE: 121 MMHG | HEART RATE: 56 BPM | RESPIRATION RATE: 12 BRPM | OXYGEN SATURATION: 92 % | TEMPERATURE: 99 F | WEIGHT: 131.38 LBS | HEIGHT: 71 IN

## 2018-04-06 PROBLEM — R07.9 CHEST PAIN: Status: ACTIVE | Noted: 2018-04-06

## 2018-04-06 LAB
BASOPHILS # BLD AUTO: 0.04 K/UL
BASOPHILS NFR BLD: 1 %
DIFFERENTIAL METHOD: ABNORMAL
EOSINOPHIL # BLD AUTO: 0.1 K/UL
EOSINOPHIL NFR BLD: 2.8 %
ERYTHROCYTE [DISTWIDTH] IN BLOOD BY AUTOMATED COUNT: 16.1 %
HCT VFR BLD AUTO: 36.6 %
HGB BLD-MCNC: 11.7 G/DL
LYMPHOCYTES # BLD AUTO: 0.7 K/UL
LYMPHOCYTES NFR BLD: 17.5 %
MCH RBC QN AUTO: 30.5 PG
MCHC RBC AUTO-ENTMCNC: 32 G/DL
MCV RBC AUTO: 96 FL
MONOCYTES # BLD AUTO: 0.3 K/UL
MONOCYTES NFR BLD: 7.4 %
NEUTROPHILS # BLD AUTO: 2.8 K/UL
NEUTROPHILS NFR BLD: 71 %
PLATELET # BLD AUTO: 206 K/UL
PMV BLD AUTO: 10.1 FL
RBC # BLD AUTO: 3.83 M/UL
WBC # BLD AUTO: 3.94 K/UL

## 2018-04-06 PROCEDURE — 94761 N-INVAS EAR/PLS OXIMETRY MLT: CPT

## 2018-04-06 PROCEDURE — 25000003 PHARM REV CODE 250: Performed by: EMERGENCY MEDICINE

## 2018-04-06 PROCEDURE — 25000003 PHARM REV CODE 250: Performed by: INTERNAL MEDICINE

## 2018-04-06 PROCEDURE — 99900035 HC TECH TIME PER 15 MIN (STAT)

## 2018-04-06 PROCEDURE — 85025 COMPLETE CBC W/AUTO DIFF WBC: CPT

## 2018-04-06 PROCEDURE — 36415 COLL VENOUS BLD VENIPUNCTURE: CPT

## 2018-04-06 PROCEDURE — S4991 NICOTINE PATCH NONLEGEND: HCPCS | Performed by: EMERGENCY MEDICINE

## 2018-04-06 RX ORDER — SPIRONOLACTONE 25 MG/1
25 TABLET ORAL DAILY
Qty: 30 TABLET | Refills: 11 | Status: SHIPPED | OUTPATIENT
Start: 2018-04-06 | End: 2019-04-06

## 2018-04-06 RX ADMIN — CLOPIDOGREL 75 MG: 75 TABLET, FILM COATED ORAL at 08:04

## 2018-04-06 RX ADMIN — SPIRONOLACTONE 25 MG: 25 TABLET, FILM COATED ORAL at 08:04

## 2018-04-06 RX ADMIN — BENAZEPRIL HYDROCHLORIDE 5 MG: 5 TABLET, COATED ORAL at 08:04

## 2018-04-06 RX ADMIN — OXYCODONE HYDROCHLORIDE 30 MG: 5 TABLET ORAL at 08:04

## 2018-04-06 RX ADMIN — OXYCODONE HYDROCHLORIDE 30 MG: 5 TABLET ORAL at 02:04

## 2018-04-06 RX ADMIN — ASPIRIN 81 MG: 81 TABLET, COATED ORAL at 08:04

## 2018-04-06 RX ADMIN — ATORVASTATIN CALCIUM 20 MG: 20 TABLET, FILM COATED ORAL at 08:04

## 2018-04-06 RX ADMIN — NICOTINE 1 PATCH: 21 PATCH, EXTENDED RELEASE TRANSDERMAL at 08:04

## 2018-04-06 RX ADMIN — METOPROLOL SUCCINATE 50 MG: 50 TABLET, EXTENDED RELEASE ORAL at 08:04

## 2018-04-06 RX ADMIN — CARISOPRODOL 350 MG: 350 TABLET ORAL at 08:04
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
## 2018-04-06 NOTE — PLAN OF CARE
Problem: Patient Care Overview  Goal: Plan of Care Review  Outcome: Ongoing (interventions implemented as appropriate)  Patient in no apparent distress. Sat's  93 % on room air. BBS course. Shortness of breath reported. MDI given . Will continue to monitor.

## 2018-04-06 NOTE — PROGRESS NOTES
[facility]  Cardiology  Progress Note    Patient Name: Griffin Hall  MRN: [id number]  Admission Date: 4/4/2018  Hospital Length of Stay: 2 days  Code Status: Full Code   Attending Physician: Garcia Mendez MD   Primary Care Physician: Jamarcus Sinha Jr, MD  Expected Discharge Date:   Principal Problem:Occlusive coronary artery disease    Subjective:     Brief HPI:    HPI:      Griffin Hall is a 73 y.o. male patient who is a heavy smoker with a history of surgery for cancer of the neck in 2005. On 6/13/2016 he began having aching in his left shoulder and over his chest. The pain came and went and got more pronounced. He had given himself 4 days for it to ease up but as it persisted he decided to seek care at the ER of [facility]. He ruled in for a NSTEMI and underwent cardiac catheterization on 6/17/2016 that revealed the mid left anterior descending coronary artery to have a mid 90% lesion. The left ventriculogram revealed a distal anteroseptal and apical akinesia. The mid LAD was stented with a NINA 3.0 x 18 mm. Following the procedure he had persitent aching in the left shoulder but no chest pain. On 8/1/2016 he had an Echo that revealed normal left ventricular size with low normal systolic function with an EF in the 50-55% range. The LA was mildly dilated. He has had no exertional chest pain but some exertional dyspnea. No palpitations or weak spells. Been feeling fair overall.     Now presents to the ER at [facility] after he began experience chest aching the night of 4/4/2018. On presentation he had new deep anterior T wave inversion. The pain resolved after several NTG sl tablets. He was admitted to ICU and has remained free from chest pain since presentation.      Hospital Course:     4/5/2018: [facility]: Cath: LAD: Proximal 50%. iFR 0.94. Mid: Stent patent. LV: Anterolateral wall mild hypokinesia. EF 50%.    Interval History:     No further chest pain. No SOB.    Review of Systems   Cardiovascular:  Negative for chest pain.   Respiratory: Negative for cough.      Objective:     Vital Signs (Most Recent):  Temp: 98.7 °F (37.1 °C) (04/06/18 0300)  Pulse: (!) 56 (04/06/18 0500)  Resp: 12 (04/06/18 0500)  BP: (!) 121/59 (04/06/18 0500)  SpO2: (!) 92 % (04/06/18 0500) Vital Signs (24h Range):  Temp:  [97.6 °F (36.4 °C)-98.7 °F (37.1 °C)] 98.7 °F (37.1 °C)  Pulse:  [54-70] 56  Resp:  [10-27] 12  SpO2:  [88 %-95 %] 92 %  BP: ()/(54-78) 121/59  Arterial Line BP: (138-176)/(68-92) 146/70     Weight: 59.6 kg (131 lb 6.3 oz)  Body mass index is 18.33 kg/m².    SpO2: (!) 92 %  O2 Device (Oxygen Therapy): room air      Intake/Output Summary (Last 24 hours) at 04/06/18 0838  Last data filed at 04/06/18 0230   Gross per 24 hour   Intake           745.31 ml   Output             1500 ml   Net          -754.69 ml       Lines/Drains/Airways     Peripheral Intravenous Line                 Peripheral IV - Single Lumen 04/04/18 2219 Left Antecubital 1 day         Peripheral IV - Single Lumen 04/04/18 2332 Right Antecubital 1 day                Physical Exam   Constitutional: He appears well-developed. No distress.   Cardiovascular: Normal rate, regular rhythm, S1 normal and S2 normal.    No murmur heard.  Pulmonary/Chest: He has rhonchi.   Abdominal: Soft. Normal appearance. There is no tenderness.   Skin: Skin is warm.     Current Medications:     aspirin  81 mg Oral Daily    atorvastatin  20 mg Oral Daily    benazepril  5 mg Oral Daily    carisoprodol  350 mg Oral TID    clopidogrel  75 mg Oral Daily    metoprolol succinate  50 mg Oral Daily    nicotine  1 patch Transdermal Daily    spironolactone  25 mg Oral Daily     Current Laboratory Results:    Recent Results (from the past 24 hour(s))   ISTAT ACT-K    Collection Time: 04/05/18 10:01 AM   Result Value Ref Range    POC ACTIVATED CLOTTING TIME K 208 (H) 74 - 137 sec    Sample UNK    Troponin I    Collection Time: 04/05/18 12:15 PM   Result Value Ref Range     Troponin I 0.017 0.000 - 0.026 ng/mL   ISTAT ACT-K    Collection Time: 04/05/18  1:30 PM   Result Value Ref Range    POC ACTIVATED CLOTTING TIME K 147 (H) 74 - 137 sec    Sample UNK    Cath lab procedure    Collection Time: 04/05/18  3:19 PM   Result Value Ref Range    Coronary Stenosis >= 50% (A)    CBC auto differential    Collection Time: 04/06/18  4:14 AM   Result Value Ref Range    WBC 3.94 3.90 - 12.70 K/uL    RBC 3.83 (L) 4.60 - 6.20 M/uL    Hemoglobin 11.7 (L) 14.0 - 18.0 g/dL    Hematocrit 36.6 (L) 40.0 - 54.0 %    MCV 96 82 - 98 fL    MCH 30.5 27.0 - 31.0 pg    MCHC 32.0 32.0 - 36.0 g/dL    RDW 16.1 (H) 11.5 - 14.5 %    Platelets 206 150 - 350 K/uL    MPV 10.1 9.2 - 12.9 fL    Gran # (ANC) 2.8 1.8 - 7.7 K/uL    Lymph # 0.7 (L) 1.0 - 4.8 K/uL    Mono # 0.3 0.3 - 1.0 K/uL    Eos # 0.1 0.0 - 0.5 K/uL    Baso # 0.04 0.00 - 0.20 K/uL    Gran% 71.0 38.0 - 73.0 %    Lymph% 17.5 (L) 18.0 - 48.0 %    Mono% 7.4 4.0 - 15.0 %    Eosinophil% 2.8 0.0 - 8.0 %    Basophil% 1.0 0.0 - 1.9 %    Differential Method Automated      Current Imaging Results:    Imaging Results          X-Ray Chest AP Portable (Final result)  Result time 04/04/18 23:26:32   Procedure changed from X-Ray Chest PA And Lateral     Final result by Sheldon Max MD (04/04/18 23:26:32)                 Impression:      Significantly limited exam secondary to bulky calcified pleural plaques, suggesting prior asbestos exposure.  No obvious detrimental change.      Electronically signed by: Sheldon Max MD  Date:    04/04/2018  Time:    23:26             Narrative:    EXAMINATION:  XR CHEST AP PORTABLE    CLINICAL HISTORY:  Chest Pain;    TECHNIQUE:  Single frontal view of the chest was performed.    COMPARISON:  CT chest, 06/16/2016.  Chest radiograph, 04/18/2017.    FINDINGS:  Cardiac wires overlie the chest.  Cardiac silhouette is not enlarged.  There are multiple large bulky calcified pleural plaques identified bilaterally, similar in appearance  to the prior exam.  Many of these pleural plaques have a prominent soft tissue component as seen on prior CT.  These findings significantly limit evaluation of the underlying lung parenchyma.  No obvious detrimental change is identified when compared with the prior exam.                                Assessment and Plan:     Problem List:    Active Diagnoses:    Diagnosis Date Noted POA    PRINCIPAL PROBLEM:  Occlusive coronary artery disease [I25.10] 06/17/2016 Yes    History of myocardial infarction [I25.2] 06/16/2016 Not Applicable    History of coronary artery stent placement [Z95.5] 06/17/2016 Not Applicable    Heart failure, systolic, chronic [I50.22] 06/18/2016 Yes    Current smoker [F17.200] 06/16/2016 Yes    History of head and neck cancer [Z85.89] 06/16/2016 Not Applicable    Hypokalemia [E87.6] 04/04/2018 Yes      Problems Resolved During this Admission:    Diagnosis Date Noted Date Resolved POA     Assessment and Plan:     1. Coronary Artery Disease              6/16/2016: NSTEMI. Troponin 0.3.              Anterior T wave inversion.              6/17/2016: OB: Cath: LAD: mid 90%. LV: Distal anteroseptal and apical akinesia. NINA 3.0 x 18 mm.              6/17/2016: Chol 111. HDL 62. LDL 52. TG 62.              Atorvastatin 20 mg Q24.                Aspirin 81 mg Q24 indefinitely.              4/4/2018: Presents with chest pain at rest.   4/5/2018: [facility]: Cath: LAD: Proximal 50%. iFR 0.94. Mid: Stent patent. LV: Anterolateral wall mild hypokinesia. EF 50%.   Appears chest pain has a non cardiac cause.              2. Heart Failure, Systolic, Chronic              6/17/2016: OB: Cath: LV: distal anteroseptal and apical akinesia. EF 35%.              6/17/2016: Echo: Left ventricular size upper normal with moderate systolic dysfunction. EF 35-40%. Anteroseptal/apical WMA.              8/1/2016: Echo: Normal left ventricular size with low normal systolic function. EF 50-55%. Mildly dilated LA.               Probably stunning as troponin so low.              On metoprolol 50 mg Q24, benazepril 5 mg Q24, spironolactone 25 mg Q24 and furosemide 20 mg Q24.              Appears well compensated.               3. Current Smoker              1967: Began smoking.              Unable to quit.              Been heavy smoker.              Encouraged to quit.              Rx patches and gums through [facility].     4. History of Neck Cancer              2005: Radical neck surgery.     5. Dysphagia              Mild after radical neck dissection.     6. Shoulder Pain              Appears to have musculoskeletal cause.    7. Pains   Labs suggests he may have RA.   Referral to rheumatology.     7. Primary Care              Dr. Jamarcus Sinha.    Home.    VTE Risk Mitigation         Ordered     IP VTE HIGH RISK PATIENT  Once      04/05/18 0905     Place sequential compression device  Until discontinued      04/05/18 0035     Place BEATA hose  Until discontinued      04/05/18 0035          Garcia Mendez MD  Cardiology  [facility]

## 2018-04-06 NOTE — PLAN OF CARE
Problem: Patient Care Overview  Goal: Plan of Care Review  Outcome: Ongoing (interventions implemented as appropriate)  NO CP. SITE HEALTHY. VERY MANIPULATIVE WITH PAIN & ANXIETY MEDS.MULT. REQUEST.

## 2018-04-06 NOTE — DISCHARGE SUMMARY
[facility]  Cardiology  Discharge Summary      Patient Name: Griffin Hall  MRN: [id number]  Admission Date: 4/4/2018  Hospital Length of Stay: 2 days  Discharge Date and Time:  04/06/2018 8:53 AM  Attending Physician: Garcia Mendez MD  Discharging Provider: Garcia Mendez MD  Primary Care Physician: Jamarcus Sinha Jr, MD    HPI:      Griffin Hall is a 73 y.o. male patient who is a heavy smoker with a history of surgery for cancer of the neck in 2005. On 6/13/2016 he began having aching in his left shoulder and over his chest. The pain came and went and got more pronounced. He had given himself 4 days for it to ease up but as it persisted he decided to seek care at the ER of [facility]. He ruled in for a NSTEMI and underwent cardiac catheterization on 6/17/2016 that revealed the mid left anterior descending coronary artery to have a mid 90% lesion. The left ventriculogram revealed a distal anteroseptal and apical akinesia. The mid LAD was stented with a NINA 3.0 x 18 mm. Following the procedure he had persitent aching in the left shoulder but no chest pain. On 8/1/2016 he had an Echo that revealed normal left ventricular size with low normal systolic function with an EF in the 50-55% range. The LA was mildly dilated. He has had no exertional chest pain but some exertional dyspnea. No palpitations or weak spells. Been feeling fair overall.     Now presents to the ER at [facility] after he began experience chest aching the night of 4/4/2018. On presentation he had new deep anterior T wave inversion. The pain resolved after several NTG sl tablets. He was admitted to ICU and has remained free from chest pain since presentation.        Hospital Course:     Serial enzymes and ECGs were negative. He was referred for cardiac catheterization.     4/5/2018: [facility]: Cath: LAD: Proximal 50%. iFR 0.94. Mid: Stent patent. LV: Anterolateral wall mild hypokinesia. EF 50%.    Accordingly it appeared his chest pain had a  noncardiac cause. After observation and replacement of his potassium he was discharged home. Rheumatologic testing that he had had as an outpatient became available suggesting that he may have rheumatoid arthritis and it was arranged for him to have further evaluation as an outpatient.      Assessment and Plan:     1. Coronary Artery Disease              6/16/2016: NSTEMI. Troponin 0.3.              Anterior T wave inversion.              6/17/2016: [facility]: Cath: LAD: mid 90%. LV: Distal anteroseptal and apical akinesia. NINA 3.0 x 18 mm.              6/17/2016: Chol 111. HDL 62. LDL 52. TG 62.              Atorvastatin 20 mg Q24.                Aspirin 81 mg Q24 indefinitely.              4/4/2018: Presents with chest pain at rest.              4/5/2018: [facility]: Cath: LAD: Proximal 50%. iFR 0.94. Mid: Stent patent. LV: Anterolateral wall mild hypokinesia. EF 50%.              Appears chest pain has a non cardiac cause.     2. Heart Failure, Systolic, Chronic              6/17/2016: [facility]: Cath: LV: distal anteroseptal and apical akinesia. EF 35%.              6/17/2016: Echo: Left ventricular size upper normal with moderate systolic dysfunction. EF 35-40%. Anteroseptal/apical WMA.              8/1/2016: Echo: Normal left ventricular size with low normal systolic function. EF 50-55%. Mildly dilated LA.              Probably stunning as troponin so low.              On metoprolol 50 mg Q24, benazepril 5 mg Q24, spironolactone 25 mg Q24 and furosemide 20 mg Q24.              Appears well compensated.     3. Current Smoker              1967: Began smoking.              Unable to quit.              Been heavy smoker.              Encouraged to quit.              Rx patches and gums through [employer].     4. History of Neck Cancer              2005: Radical neck surgery.     5. Dysphagia              Mild after radical neck dissection.     6. Shoulder Pain              Appears to have musculoskeletal cause.     7. Pains               Labs suggests he may have RA.              Referral to rheumatology.     7. Primary Care              Dr. Jamarcus Sinha.          Procedure(s) (LRB):  HEART CATH-LEFT (Left): 4/5/2018: [facility]: Cath: LAD: Proximal 50%. iFR 0.94. Mid: Stent patent. LV: Anterolateral wall mild hypokinesia. EF 50%.      Indwelling Lines/Drains at time of discharge:  Lines/Drains/Airways          No matching active lines, drains, or airways          Consults: None.     Significant Diagnostic Studies: Labs:   BMP:   Recent Labs  Lab 04/04/18  1442 04/04/18 2218 04/05/18  0340   GLU 86 122* 94    136 137   K 2.7* 2.6* 3.4*   CL 85* 85* 92*   CO2 38* 41* 34*   BUN 8 8 6*   CREATININE 0.8 0.8 0.7   CALCIUM 9.3 9.5 9.0   MG  --   --  1.9    and CMP   Recent Labs  Lab 04/04/18  1442 04/04/18 2218 04/05/18  0340    136 137   K 2.7* 2.6* 3.4*   CL 85* 85* 92*   CO2 38* 41* 34*   GLU 86 122* 94   BUN 8 8 6*   CREATININE 0.8 0.8 0.7   CALCIUM 9.3 9.5 9.0   PROT 8.1 7.9  --    ALBUMIN 3.5 3.6  --    BILITOT 0.3 0.3  --    ALKPHOS 155* 141*  --    AST 25 25  --    ALT 19 21  --    ANIONGAP 13 10 11   ESTGFRAFRICA >60.0 >60 >60   EGFRNONAA >60.0 >60 >60       Pending Diagnostic Studies:     None          Final Active Diagnoses:    Diagnosis Date Noted POA    PRINCIPAL PROBLEM:  Chest pain [R07.9] 04/06/2018 Yes    Coronary artery disease [I25.10] 06/17/2016 Yes    History of myocardial infarction [I25.2] 06/16/2016 Not Applicable    History of coronary artery stent placement [Z95.5] 06/17/2016 Not Applicable    Heart failure, systolic, chronic [I50.22] 06/18/2016 Yes    Current smoker [F17.200] 06/16/2016 Yes    History of head and neck cancer [Z85.89] 06/16/2016 Not Applicable    Hypokalemia [E87.6] 04/04/2018 Yes      Problems Resolved During this Admission:    Diagnosis Date Noted Date Resolved POA       Discharged Condition: fair    Follow Up:  Follow-up Information     Jamarcus Sinha Jr, MD. Schedule an appointment as  soon as possible for a visit in 2 weeks.    Specialty:  Family Medicine  Why:  Medical follow up.  Contact information:  [address]  [address]  [facility] [address]  [phone]             Garcia Mendez MD. Schedule an appointment as soon as possible for a visit in 2 weeks.    Specialty:  Cardiology  Why:  CARDIAC FOLLOW UP.  Contact information:  [address]  [facility] [address]  [phone]             Schedule an appointment as soon as possible for a visit with Rick [facility] - Rheumatology.    Specialty:  Rheumatology  Why:  Rheumatology.  Contact information:  [address]  [facility] [address] [phone]  Additional information:  [facility] - [address]th Floor               Patient Instructions:     Ambulatory consult to Rheumatology   Referral Priority: Routine Referral Type: Consultation   Referral Reason: Specialty Services Required    Requested Specialty: Rheumatology    Number of Visits Requested: 1      Diet Cardiac     Activity as tolerated       Medications:  Reconciled Home Medications:      Medication List      START taking these medications    spironolactone 25 MG tablet  Commonly known as:  ALDACTONE  Take 1 tablet (25 mg total) by mouth once daily.        CONTINUE taking these medications    albuterol 90 mcg/actuation inhaler  Commonly known as:  VENTOLIN HFA  Inhale 2 puffs into the lungs every 6 (six) hours as needed for Wheezing.     ALPRAZolam 2 MG Tab  Commonly known as:  XANAX  Take 1 tablet (2 mg total) by mouth 2 (two) times daily as needed for Anxiety.     aspirin 81 MG EC tablet  Commonly known as:  ECOTRIN  Take 1 tablet (81 mg total) by mouth once daily.     atorvastatin 20 MG tablet  Commonly known as:  LIPITOR  TAKE 1 TABLET (20 MG TOTAL) BY MOUTH ONCE DAILY.     benazepril 5 MG tablet  Commonly known as:  LOTENSIN  Take 1 tablet (5 mg total) by mouth once daily.     carisoprodol 350 MG tablet  Commonly known as:  SOMA     celecoxib 200 MG capsule  Commonly  known as:  CeleBREX     fluticasone 50 mcg/actuation nasal spray  Commonly known as:  FLONASE     furosemide 20 MG tablet  Commonly known as:  LASIX  Take 1 tablet (20 mg total) by mouth once daily.     metoprolol succinate 50 MG 24 hr tablet  Commonly known as:  TOPROL-XL  TAKE 1 TABLET BY MOUTH EVERY DAY     nicotine 21 mg/24 hr  Commonly known as:  NICODERM CQ  Place 1 patch onto the skin once daily.     nicotine polacrilex 4 MG Lozg  Take 1 lozenge (4 mg total) by mouth as needed.     oxyCODONE 30 MG Tab  Commonly known as:  ROXICODONE  Take 1 tablet (30 mg total) by mouth 3 (three) times daily as needed.           Where to Get Your Medications      These medications were sent to [id number]    Phone:  [phone]   · spironolactone 25 MG tablet         Time spent on the discharge of patient: 40 minutes    Garcia Mendez MD  Cardiology  [facility]

## 2018-04-10 ENCOUNTER — OFFICE VISIT (OUTPATIENT)
Dept: PAIN MEDICINE | Facility: CLINIC | Age: 73
End: 2018-04-10
Payer: MEDICARE

## 2018-04-10 VITALS
DIASTOLIC BLOOD PRESSURE: 58 MMHG | BODY MASS INDEX: 18.52 KG/M2 | WEIGHT: 132.25 LBS | RESPIRATION RATE: 16 BRPM | HEIGHT: 71 IN | SYSTOLIC BLOOD PRESSURE: 100 MMHG | TEMPERATURE: 98 F | HEART RATE: 63 BPM

## 2018-04-10 DIAGNOSIS — M54.17 LUMBOSACRAL RADICULOPATHY: ICD-10-CM

## 2018-04-10 DIAGNOSIS — G89.4 CHRONIC PAIN SYNDROME: Primary | ICD-10-CM

## 2018-04-10 DIAGNOSIS — S32.020D CLOSED COMPRESSION FRACTURE OF L2 LUMBAR VERTEBRA, WITH ROUTINE HEALING, SUBSEQUENT ENCOUNTER: ICD-10-CM

## 2018-04-10 DIAGNOSIS — M50.30 DDD (DEGENERATIVE DISC DISEASE), CERVICAL: ICD-10-CM

## 2018-04-10 DIAGNOSIS — M51.36 DDD (DEGENERATIVE DISC DISEASE), LUMBAR: ICD-10-CM

## 2018-04-10 DIAGNOSIS — Z79.891 ENCOUNTER FOR LONG-TERM OPIATE ANALGESIC USE: ICD-10-CM

## 2018-04-10 DIAGNOSIS — M54.12 CERVICAL RADICULOPATHY: ICD-10-CM

## 2018-04-10 DIAGNOSIS — M47.26 OSTEOARTHRITIS OF SPINE WITH RADICULOPATHY, LUMBAR REGION: ICD-10-CM

## 2018-04-10 DIAGNOSIS — M48.02 CERVICAL STENOSIS OF SPINAL CANAL: ICD-10-CM

## 2018-04-10 DIAGNOSIS — Z85.89 HISTORY OF HEAD AND NECK CANCER: ICD-10-CM

## 2018-04-10 PROCEDURE — 99999 PR PBB SHADOW E&M-EST. PATIENT-LVL III: CPT | Mod: PBBFAC,,, | Performed by: NURSE PRACTITIONER

## 2018-04-10 PROCEDURE — 99213 OFFICE O/P EST LOW 20 MIN: CPT | Mod: PBBFAC | Performed by: NURSE PRACTITIONER

## 2018-04-10 PROCEDURE — 99214 OFFICE O/P EST MOD 30 MIN: CPT | Mod: S$PBB,,, | Performed by: NURSE PRACTITIONER

## 2018-04-10 RX ORDER — OXYCODONE HYDROCHLORIDE 30 MG/1
30 TABLET ORAL EVERY 8 HOURS PRN
Qty: 90 TABLET | Refills: 0 | Status: SHIPPED | OUTPATIENT
Start: 2018-04-10 | End: 2018-05-09 | Stop reason: SDUPTHER

## 2018-04-10 NOTE — PROGRESS NOTES
Chronic Pain - Follow Up     Referring Physician: Ayden Almonte MD     Chief Complaint   Patient presents with    Low-back Pain    Neck Pain         SUBJECTIVE: Disclaimer: This note has been generated using voice-recognition software. There may be typographical errors that have been missed during proof-reading      The patient presents today for follow up for neck and low back pain. Since the last visit, he was admitted to the hospital with chest pain. He did have EKG changes and was taken to cath lab where a stent was placed. He continues to report low back pain that radiates down the side of both legs to his ankles. He continues to report multiple joint pain to ankles, knees, and hands. He is scheduled to see rheumatology. He continues to report neck pain with intermittent pain radiating down to both arms. He reports that his current medication regimen provides significant benefit. He continues to take Gabapentin with benefit. He continues to take Oxycodone with benefit and without adverse effects. He reports that he breaks the pills in half and takes them with Jello as a previous barium swallow proved pills becoming lodged in the vallecula. 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 He did establish care with a new PCP to evaluate swelling in his lower extremities. He denies any other health changes. He denies any bowel or bladder incontinence. His pain today is 8/10.       Pain Medications:  Current:  Oxycodone 30mg TID  Gabapentin  Carloz- Dr. Alfonso     Tried in Past:  NSAIDs - Celebrex and Ibuprofen  TCA - Yes  SNRI - Yes  Anti-convulsants -Gabapentin and Lyrica  Muscle Relaxants - Soma  Opioids- oxycodone     Physical Therapy/Home Exercise: no       report: Reviewed and consistent with medication use as prescribed.     Pain Procedures:   ESIs in the past (1994)  8/7/17 T1-T2 IL VALERI- 50% relief for one week  9/14/2017- L4-5 IL VALERI- no relief  10/2/2017-  T1-2 IL VALERI  2/19/2018- L4-5 IL VALERI     Chiropractor -never  Acupuncture - never  TENS unit -never  Spinal decompression -Cervical fusion  Joint replacement -never    Imaging:   MRI Cervical Spine 1/19/2017:  Stable anterior and interbody fusion of C5-6. 2 mm anterolisthesis at C4 which were not present on the prior study. Spondylosis at multiple cervical levels which has worsened since the prior study in 2012.     C2-3: A posterior disc protrusion is present causing moderate spinal stenosis, effacement of the anterior thecal sac and flattening of the anterior cervical cord.     C3-4: Posterior disc protrusion which causes moderate spinal stenosis, effacement of the anterior thecal sac, and flattening of the anterior cervical cord.     C4-5: Posterior disc osteophyte complex which has worsened significantly since the prior study and now causes moderate spinal stenosis, effacement of the anterior thecal sac, and flattening of the anterior cervical cord.    C6-7: Posterior disc osteophyte complex is stable when compared to the prior study. Thickening of the ligamentum flavum has worsened. Moderate spinal stenosis with effacement of the anterior thecal sac. Moderate to severe right-sided neural foraminal stenosis with suspected impingement of the exiting nerve root.       MRI Thoracic Spine 1/19/2017:  An exaggerated thoracic kyphosis. Old superior endplate compression fracture to the T4 vertebral body. Superior endplate Schmorl's nodes are present at T1 and T2. Disc bulge at T3-4 which does not cause significant spinal or neural foraminal stenosis. Thick potentially calcified pleural plaques are present bilaterally along with airspace disease, atelectasis, and pulmonary nodularity. Dedicated imaging of the chest is recommended if this has not already been done.     MRI Lumbar Spine 1/19/2017:  A superior endplate compression fracture is present to the L2 vertebral body which has healed with a residual 30% loss of  height. 2 mm retrolisthesis at L2.     L1-2: Annular bulge without spinal or neural foraminal stenosis.     L2-3 Annular bulge with mild spinal stenosis and no neural foraminal stenosis.    L3-4: Annular bulge with mild spinal stenosis and mild bilateral neural foraminal stenosis.     L4-5: Annular bulge and small posterior annular tear seen on image 11 of series 3. An annular defect is present to the anterior right lateral portion of the disc and was not seen on the prior study. Mild spinal stenosis and moderate bilateral neural foraminal stenosis.     L5-S1: Annular bulge with no spinal stenosis and mild left-sided neural foraminal stenosis.       CMP  Sodium   Date Value Ref Range Status   04/05/2018 137 136 - 145 mmol/L Final     Potassium   Date Value Ref Range Status   04/05/2018 3.4 (L) 3.5 - 5.1 mmol/L Final     Chloride   Date Value Ref Range Status   04/05/2018 92 (L) 95 - 110 mmol/L Final     CO2   Date Value Ref Range Status   04/05/2018 34 (H) 23 - 29 mmol/L Final     Glucose   Date Value Ref Range Status   04/05/2018 94 70 - 110 mg/dL Final     BUN, Bld   Date Value Ref Range Status   04/05/2018 6 (L) 8 - 23 mg/dL Final     Creatinine   Date Value Ref Range Status   04/05/2018 0.7 0.5 - 1.4 mg/dL Final     Calcium   Date Value Ref Range Status   04/05/2018 9.0 8.7 - 10.5 mg/dL Final     Total Protein   Date Value Ref Range Status   04/04/2018 7.9 6.0 - 8.4 g/dL Final     Albumin   Date Value Ref Range Status   04/04/2018 3.6 3.5 - 5.2 g/dL Final     Total Bilirubin   Date Value Ref Range Status   04/04/2018 0.3 0.1 - 1.0 mg/dL Final     Comment:     For infants and newborns, interpretation of results should be based  on gestational age, weight and in agreement with clinical  observations.  Premature Infant recommended reference ranges:  Up to 24 hours.............<8.0 mg/dL  Up to 48 hours............<12.0 mg/dL  3-5 days..................<15.0 mg/dL  6-29 days.................<15.0 mg/dL    "    Alkaline Phosphatase   Date Value Ref Range Status   04/04/2018 141 (H) 55 - 135 U/L Final     AST   Date Value Ref Range Status   04/04/2018 25 10 - 40 U/L Final     ALT   Date Value Ref Range Status   04/04/2018 21 10 - 44 U/L Final     Anion Gap   Date Value Ref Range Status   04/05/2018 11 8 - 16 mmol/L Final     eGFR if    Date Value Ref Range Status   04/05/2018 >60 >60 mL/min/1.73 m^2 Final     eGFR if non    Date Value Ref Range Status   04/05/2018 >60 >60 mL/min/1.73 m^2 Final     Comment:     Calculation used to obtain the estimated glomerular filtration  rate (eGFR) is the CKD-EPI equation.             REVIEW OF SYSTEMS:     GENERAL:  Weight loss and decreased appetite  HEENT:  No recent changes in vision or hearing  NECK:  difficulty with swallowing.  RESPIRATORY: Negative for cough, wheezing or shortness of breath, patient denies any recent URI.  CARDIOVASCULAR: Negative for chest pain, leg swelling or palpitations.  GI: Negative for abdominal discomfort, blood in stools or black stools or change in bowel habits.  MUSCULOSKELETAL: See HPI.  SKIN: Negative for lesions, rash, and itching.  PSYCH:  Depression and anxiety treated with citalopram, Wellbutrin, alprazolam.  Patient's sleep is disturbed secondary to pain.  HEMATOLOGY/LYMPHOLOGY: Negative for prolonged bleeding, bruising easily or swollen nodes.   ENDO: No history of diabetes or thyroid dysfunction  NEURO: No history of headaches, syncope, paralysis, seizures or tremors.  All other reviewed and negative other than HPI.     OBJECTIVE:    BP (!) 100/58   Pulse 63   Temp 98.2 °F (36.8 °C) (Oral)   Resp 16   Ht 5' 11" (1.803 m)   Wt 60 kg (132 lb 4.4 oz)   BMI 18.45 kg/m²     PHYSICAL EXAMINATION:    GENERAL: Well appearing, in no acute distress, alert and oriented x3.  PSYCH:  Mood and affect appropriate.  SKIN: Skin color, texture, turgor normal, no rashes or lesions. Multiple bruises noted to " BUE.  HEAD/FACE:  Normocephalic, atraumatic. Cranial nerves grossly intact.  NECK: Pain to palpation over the cervical paraspinous muscles. Spurling positive on right side. Limited ROM with pain on extension, and lateral flexion. Positive facet loading bilaterally.  CV: RRR with palpation of the radial artery.  PULM: No evidence of respiratory difficulty, symmetric chest rise.  BACK: Straight leg raise in the supine position is positive for radicular pain bilaterally. There is pain with palpation over lumbar spine. Limited ROM with pain on extension. Positive facet loading bilaterally.   EXTREMITIES: Peripheral joint ROM is full and pain free without obvious instability or laxity in all four extremities. Trigger finger to left middle finger.  Good capillary refill. +2 pitting edema to bilateral ankles.   MUSCULOSKELETAL:  No atrophy or tone abnormalities are noted.  NEURO: Bilateral upper extremity coordination and muscle stretch reflexes are physiologic and symmetric.  Darren's negative. No clonus.  No loss of sensation is noted.  GAIT: Antalgic, ambulates without assistance      ASSESSMENT: 70 y.o. year old male with pain, consistent with      Encounter Diagnoses   Name Primary?    Chronic pain syndrome Yes    Osteoarthritis of spine with radiculopathy, lumbar region     Lumbosacral radiculopathy     Closed compression fracture of L2 lumbar vertebra, with routine healing, subsequent encounter     DDD (degenerative disc disease), lumbar     Cervical radiculopathy     Cervical stenosis of spinal canal     DDD (degenerative disc disease), cervical     History of head and neck cancer     Encounter for long-term opiate analgesic use       PLAN:     - Previous imaging was reviewed and discussed with the patient today. Previous CMP reviewed.     - He is s/p L4-5 IL VALERI with benefit. We can repeat this in the future.     - He is s/p T1-2 IL VALERI with benefit. We can repeat this in the future.     - Follow up  with rheumatology.     - Continue Gabapentin 300 mg TID.     - Oxycodone 30 mg TID PRN pain, #90, 0 refills. We discussed breaking the pills in half and taking with pudding due to impaired swallowing. Refill provided today.     - The patient is here today for a refill of current pain medications and they believe these provide effective pain control and improvements in quality of life.  The patient notes no serious side effects, and feels the benefits outweigh the risks.  The patient was reminded of the pain contract that they signed previously as well as the risks and benefits of the medication including possible death.  The updated [place] Board [employer] prescription monitoring program was reviewed, and the patient has been found to be compliant with current treatment plan. Medication management provided by Dr. Dietz.     - UDS from 3/7/2018 reviewed and consistant.     - Can continue Soma from Dr. Alfonso.    - RTC in 1 month.     - The patient will continue a home exercise routine to help with pain and strengthening.      - Dr. Dietz was consulted on the patient and agrees with this plan.    The above plan and management options were discussed at length with patient. Patient is in agreement with the above and verbalized understanding.     Janis Loera NP  04/10/2018

## 2018-04-11 PROBLEM — R89.4 SEROLOGIC ABNORMALITY: Status: ACTIVE | Noted: 2018-04-11

## 2018-04-12 ENCOUNTER — TELEPHONE (OUTPATIENT)
Dept: FAMILY MEDICINE | Facility: CLINIC | Age: 73
End: 2018-04-12

## 2018-04-12 NOTE — TELEPHONE ENCOUNTER
----- Message from Luz Newton sent at 4/12/2018  9:54 AM CDT -----  Contact: Pt  ---FST Request---    Name of Who is Calling: BERKLEY STEEN [[id number]]    What is the request in detail: Patient states he missed his appointment @ 9:20am patient is still requesting to be seen today... Please contact to further discuss and advise       Can the clinic reply by [facility]: No      What Number [facility] Back [facility]: [phone]

## 2018-04-12 NOTE — TELEPHONE ENCOUNTER
After discussing patient's message in regards to him missing his appt on today. Dr. Sinha states he will contact the patient on today to discuss his hospital visit.     Patient has been informed that Dr. Sinha will contact him sometime today.

## 2018-04-12 NOTE — TELEPHONE ENCOUNTER
----- Message from Luz Newton sent at 4/12/2018  9:54 AM CDT -----  Contact: Pt  ---FST Request---    Name of Who is Calling: BERKLEY STEEN [[id number]]    What is the request in detail: Patient states he missed his appointment @ 9:20am patient is still requesting to be seen today... Please contact to further discuss and advise       Can the clinic reply by [facility]: No      [facility]: [phone]

## 2018-04-12 NOTE — TELEPHONE ENCOUNTER
Attempted to contact patient in regards to his phone. Patient did not answer. Left voicemail informing patient to return call.

## 2018-04-12 NOTE — TELEPHONE ENCOUNTER
----- Message from Terrie Jett sent at 4/12/2018 12:02 PM CDT -----  x_  1st Request  _  2nd Request  _  3rd Request    Who: BERKLEY STEEN [[id number]]    Why: Patient is returning a call.    What Number to Call Back:[phone]    When to Expect a call back: (Within 24 hours)    Please return the call at earliest convenience. Thanks!

## 2018-04-14 NOTE — TELEPHONE ENCOUNTER
Attempted multiple times to reach patient over last few days.  Laboratory test obtain just prior to recent admission consistent with possible rheumatoid arthritis.  I am recommending evaluation by rheumatologist.  Referral previously entered in February by Ms. Loera In pain management.

## 2018-04-16 NOTE — TELEPHONE ENCOUNTER
Attempted to contact patient to discuss Dr. Sinha's message. Patient was not available. Left voicemail informing patient to return call.

## 2018-04-17 NOTE — TELEPHONE ENCOUNTER
Spoke with the patient to discuss Dr. Sinha's message. Patient states he would like to get scheduled with Rheumatology in [place] because he doesn't drive.     Patient would also like if Dr. Sinha recommends anyone particular?

## 2018-04-17 NOTE — TELEPHONE ENCOUNTER
[facility] only see patients at [facility].  I recommend Dr. Rdz.

## 2018-04-18 NOTE — TELEPHONE ENCOUNTER
Marley, please contact the patient to schedule an appt with Dr. Rdz and inform the patient that Dr. Sinha states [facility] only sees patients at [facility]. Thanks!

## 2018-05-09 ENCOUNTER — OFFICE VISIT (OUTPATIENT)
Dept: PAIN MEDICINE | Facility: CLINIC | Age: 73
End: 2018-05-09
Payer: MEDICARE

## 2018-05-09 VITALS
RESPIRATION RATE: 18 BRPM | TEMPERATURE: 98 F | BODY MASS INDEX: 17.9 KG/M2 | WEIGHT: 127.88 LBS | SYSTOLIC BLOOD PRESSURE: 101 MMHG | HEART RATE: 63 BPM | HEIGHT: 71 IN | DIASTOLIC BLOOD PRESSURE: 62 MMHG

## 2018-05-09 DIAGNOSIS — M50.30 DDD (DEGENERATIVE DISC DISEASE), CERVICAL: ICD-10-CM

## 2018-05-09 DIAGNOSIS — M54.17 LUMBOSACRAL RADICULOPATHY: ICD-10-CM

## 2018-05-09 DIAGNOSIS — M48.02 CERVICAL STENOSIS OF SPINAL CANAL: ICD-10-CM

## 2018-05-09 DIAGNOSIS — Z85.89 HISTORY OF HEAD AND NECK CANCER: ICD-10-CM

## 2018-05-09 DIAGNOSIS — S32.020D CLOSED COMPRESSION FRACTURE OF L2 LUMBAR VERTEBRA, WITH ROUTINE HEALING, SUBSEQUENT ENCOUNTER: ICD-10-CM

## 2018-05-09 DIAGNOSIS — G89.4 CHRONIC PAIN SYNDROME: ICD-10-CM

## 2018-05-09 DIAGNOSIS — M54.12 CERVICAL RADICULOPATHY: ICD-10-CM

## 2018-05-09 DIAGNOSIS — M47.26 OSTEOARTHRITIS OF SPINE WITH RADICULOPATHY, LUMBAR REGION: ICD-10-CM

## 2018-05-09 DIAGNOSIS — M51.36 DDD (DEGENERATIVE DISC DISEASE), LUMBAR: ICD-10-CM

## 2018-05-09 PROCEDURE — 99214 OFFICE O/P EST MOD 30 MIN: CPT | Mod: S$PBB,,, | Performed by: NURSE PRACTITIONER

## 2018-05-09 PROCEDURE — 99214 OFFICE O/P EST MOD 30 MIN: CPT | Mod: PBBFAC | Performed by: NURSE PRACTITIONER

## 2018-05-09 PROCEDURE — 99999 PR PBB SHADOW E&M-EST. PATIENT-LVL IV: CPT | Mod: PBBFAC,,, | Performed by: NURSE PRACTITIONER

## 2018-05-09 RX ORDER — OXYCODONE HYDROCHLORIDE 30 MG/1
30 TABLET ORAL EVERY 8 HOURS PRN
Qty: 90 TABLET | Refills: 0 | Status: SHIPPED | OUTPATIENT
Start: 2018-05-09 | End: 2018-06-07 | Stop reason: SDUPTHER

## 2018-05-09 RX ORDER — ALPRAZOLAM 2 MG/1
2 TABLET ORAL 2 TIMES DAILY PRN
Qty: 60 TABLET | Refills: 0 | Status: SHIPPED | OUTPATIENT
Start: 2018-05-09 | End: 2018-05-25 | Stop reason: SDUPTHER

## 2018-05-09 NOTE — PROGRESS NOTES
Chronic Pain - Follow Up     Referring Physician: Ayden Almonte MD     Chief Complaint   Patient presents with    Mid-back Pain    Low-back Pain    Neck Pain         SUBJECTIVE: Disclaimer: This note has been generated using voice-recognition software. There may be typographical errors that have been missed during proof-reading      The patient presents today for follow up for neck and low back pain. He continues to neck and mid back pain that radiates down both arms, right worse than left. He describes this pain as burning and shooting. He continues to low back pain that radiates down the side of his legs to his ankles. He reports that his current medication regimen provides significant benefit. He continues to take Gabapentin with benefit. He continues to take Oxycodone with benefit and without adverse effects. He reports that he breaks the pills in half and takes them with Jello as a previous barium swallow proved pills becoming lodged in the vallecula. 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 He denies any other health changes. He denies any bowel or bladder incontinence. His pain today is 9/10.       Pain Medications:  Current:  Oxycodone 30mg TID  Gabapentin  Carloz- Dr. Alfonso     Tried in Past:  NSAIDs - Celebrex and Ibuprofen  TCA - Yes  SNRI - Yes  Anti-convulsants -Gabapentin and Lyrica  Muscle Relaxants - Soma  Opioids- oxycodone     Physical Therapy/Home Exercise: no       report: Reviewed and consistent with medication use as prescribed.     Pain Procedures:   ESIs in the past (1994)  8/7/17 T1-T2 IL VALERI- 50% relief for one week  9/14/2017- L4-5 IL VALERI- no relief  10/2/2017- T1-2 IL VALERI  2/19/2018- L4-5 IL VALERI     Chiropractor -never  Acupuncture - never  TENS unit -never  Spinal decompression -Cervical fusion  Joint replacement -never    Imaging:   MRI Cervical Spine 1/19/2017:  Stable anterior and interbody fusion of C5-6. 2 mm anterolisthesis  at C4 which were not present on the prior study. Spondylosis at multiple cervical levels which has worsened since the prior study in 2012.     C2-3: A posterior disc protrusion is present causing moderate spinal stenosis, effacement of the anterior thecal sac and flattening of the anterior cervical cord.     C3-4: Posterior disc protrusion which causes moderate spinal stenosis, effacement of the anterior thecal sac, and flattening of the anterior cervical cord.     C4-5: Posterior disc osteophyte complex which has worsened significantly since the prior study and now causes moderate spinal stenosis, effacement of the anterior thecal sac, and flattening of the anterior cervical cord.    C6-7: Posterior disc osteophyte complex is stable when compared to the prior study. Thickening of the ligamentum flavum has worsened. Moderate spinal stenosis with effacement of the anterior thecal sac. Moderate to severe right-sided neural foraminal stenosis with suspected impingement of the exiting nerve root.       MRI Thoracic Spine 1/19/2017:  An exaggerated thoracic kyphosis. Old superior endplate compression fracture to the T4 vertebral body. Superior endplate Schmorl's nodes are present at T1 and T2. Disc bulge at T3-4 which does not cause significant spinal or neural foraminal stenosis. Thick potentially calcified pleural plaques are present bilaterally along with airspace disease, atelectasis, and pulmonary nodularity. Dedicated imaging of the chest is recommended if this has not already been done.     MRI Lumbar Spine 1/19/2017:  A superior endplate compression fracture is present to the L2 vertebral body which has healed with a residual 30% loss of height. 2 mm retrolisthesis at L2.     L1-2: Annular bulge without spinal or neural foraminal stenosis.     L2-3 Annular bulge with mild spinal stenosis and no neural foraminal stenosis.    L3-4: Annular bulge with mild spinal stenosis and mild bilateral neural foraminal  stenosis.     L4-5: Annular bulge and small posterior annular tear seen on image 11 of series 3. An annular defect is present to the anterior right lateral portion of the disc and was not seen on the prior study. Mild spinal stenosis and moderate bilateral neural foraminal stenosis.     L5-S1: Annular bulge with no spinal stenosis and mild left-sided neural foraminal stenosis.       CMP  Sodium   Date Value Ref Range Status   04/05/2018 137 136 - 145 mmol/L Final     Potassium   Date Value Ref Range Status   04/05/2018 3.4 (L) 3.5 - 5.1 mmol/L Final     Chloride   Date Value Ref Range Status   04/05/2018 92 (L) 95 - 110 mmol/L Final     CO2   Date Value Ref Range Status   04/05/2018 34 (H) 23 - 29 mmol/L Final     Glucose   Date Value Ref Range Status   04/05/2018 94 70 - 110 mg/dL Final     BUN, Bld   Date Value Ref Range Status   04/05/2018 6 (L) 8 - 23 mg/dL Final     Creatinine   Date Value Ref Range Status   04/05/2018 0.7 0.5 - 1.4 mg/dL Final     Calcium   Date Value Ref Range Status   04/05/2018 9.0 8.7 - 10.5 mg/dL Final     Total Protein   Date Value Ref Range Status   04/04/2018 7.9 6.0 - 8.4 g/dL Final     Albumin   Date Value Ref Range Status   04/04/2018 3.6 3.5 - 5.2 g/dL Final     Total Bilirubin   Date Value Ref Range Status   04/04/2018 0.3 0.1 - 1.0 mg/dL Final     Comment:     For infants and newborns, interpretation of results should be based  on gestational age, weight and in agreement with clinical  observations.  Premature Infant recommended reference ranges:  Up to 24 hours.............<8.0 mg/dL  Up to 48 hours............<12.0 mg/dL  3-5 days..................<15.0 mg/dL  6-29 days.................<15.0 mg/dL       Alkaline Phosphatase   Date Value Ref Range Status   04/04/2018 141 (H) 55 - 135 U/L Final     AST   Date Value Ref Range Status   04/04/2018 25 10 - 40 U/L Final     ALT   Date Value Ref Range Status   04/04/2018 21 10 - 44 U/L Final     Anion Gap   Date Value Ref Range Status  "  04/05/2018 11 8 - 16 mmol/L Final     eGFR if    Date Value Ref Range Status   04/05/2018 >60 >60 mL/min/1.73 m^2 Final     eGFR if non    Date Value Ref Range Status   04/05/2018 >60 >60 mL/min/1.73 m^2 Final     Comment:     Calculation used to obtain the estimated glomerular filtration  rate (eGFR) is the CKD-EPI equation.             REVIEW OF SYSTEMS:     GENERAL:  Weight loss and decreased appetite  HEENT:  No recent changes in vision or hearing  NECK:  difficulty with swallowing.  RESPIRATORY: Negative for cough, wheezing or shortness of breath, patient denies any recent URI.  CARDIOVASCULAR: Negative for chest pain, leg swelling or palpitations.  GI: Negative for abdominal discomfort, blood in stools or black stools or change in bowel habits.  MUSCULOSKELETAL: See HPI.  SKIN: Negative for lesions, rash, and itching.  PSYCH:  Depression and anxiety treated with citalopram, Wellbutrin, alprazolam.  Patient's sleep is disturbed secondary to pain.  HEMATOLOGY/LYMPHOLOGY: Negative for prolonged bleeding, bruising easily or swollen nodes.   ENDO: No history of diabetes or thyroid dysfunction  NEURO: No history of headaches, syncope, paralysis, seizures or tremors.  All other reviewed and negative other than HPI.     OBJECTIVE:    /62   Pulse 63   Temp 98 °F (36.7 °C) (Oral)   Resp 18   Ht 5' 11" (1.803 m)   Wt 58 kg (127 lb 14.4 oz)   BMI 17.84 kg/m²     PHYSICAL EXAMINATION:    GENERAL: Well appearing, in no acute distress, alert and oriented x3.  PSYCH:  Mood and affect appropriate.  SKIN: Skin color, texture, turgor normal, no rashes or lesions. Multiple bruises noted to BUE.  HEAD/FACE:  Normocephalic, atraumatic. Cranial nerves grossly intact.  NECK: Pain to palpation over the cervical paraspinous muscles. Pain with palpation over cervical spine. Spurling positive on right side. Limited ROM with pain on extension, and lateral flexion. Positive facet loading " bilaterally.  CV: RRR with palpation of the radial artery.  PULM: No evidence of respiratory difficulty, symmetric chest rise.  BACK: Straight leg raise in the supine position is positive for radicular pain bilaterally. There is pain with palpation over lumbar spine. Limited ROM with pain on extension. Positive facet loading bilaterally.   EXTREMITIES: Peripheral joint ROM is full and pain free without obvious instability or laxity in all four extremities. Trigger finger to left middle finger.  Good capillary refill. +2 pitting edema to bilateral ankles.   MUSCULOSKELETAL:  No atrophy or tone abnormalities are noted.  NEURO: Bilateral upper extremity coordination and muscle stretch reflexes are physiologic and symmetric.  Darren's negative. No clonus.  No loss of sensation is noted.  GAIT: Antalgic, ambulates without assistance      ASSESSMENT: 70 y.o. year old male with pain, consistent with      Encounter Diagnoses   Name Primary?    Chronic pain syndrome     Osteoarthritis of spine with radiculopathy, lumbar region     Lumbosacral radiculopathy     Closed compression fracture of L2 lumbar vertebra, with routine healing, subsequent encounter     DDD (degenerative disc disease), lumbar     Cervical radiculopathy     Cervical stenosis of spinal canal     DDD (degenerative disc disease), cervical     History of head and neck cancer       PLAN:     - Previous imaging was reviewed and discussed with the patient today. Previous CMP reviewed.     - Schedule for T1-2 IL VALERI.   The procedure, risks, benefits and options were discussed with patient. There are no contraindications to the procedure. The patient expressed understanding and agreed to proceed.  Consent obtained today.    - He is s/p L4-5 IL VALERI with benefit. We can repeat this in the future.     - Follow up with rheumatology.     - Continue Gabapentin 300 mg TID.     - Oxycodone 30 mg TID PRN pain, #90, 0 refills. We discussed breaking the pills in  half and taking with pudding due to impaired swallowing. Refill provided today.     - The patient is here today for a refill of current pain medications and they believe these provide effective pain control and improvements in quality of life.  The patient notes no serious side effects, and feels the benefits outweigh the risks.  The patient was reminded of the pain contract that they signed previously as well as the risks and benefits of the medication including possible death.  The updated [place] Board of Pharmacy prescription monitoring program was reviewed, and the patient has been found to be compliant with current treatment plan. Medication management provided by Dr. Dietz.     - UDS from 3/7/2018 reviewed and consistant.     - Can continue Soma from Dr. Alfonso.    - RTC in 1 month.     - The patient will continue a home exercise routine to help with pain and strengthening.      - Dr. Dietz was consulted on the patient and agrees with this plan.    The above plan and management options were discussed at length with patient. Patient is in agreement with the above and verbalized understanding.     Janis Loera NP  05/09/2018

## 2018-05-11 ENCOUNTER — PATIENT OUTREACH (OUTPATIENT)
Dept: ADMINISTRATIVE | Facility: HOSPITAL | Age: 73
End: 2018-05-11

## 2018-05-11 NOTE — PROGRESS NOTES
[facility] is committed to your overall health.  To help you get the most out of each of your visits, we will review your information to make sure you are up to date on all of your recommended tests and/or procedures.       Your PCP  Jamarcus Sinha Jr, MD   found that you may be due for:       Health Maintenance Due   Topic Date Due    Hepatitis C Screening  1945    Fecal Occult Blood Test (FOBT)/FitKit  1945    TETANUS VACCINE  03/15/1965    Zoster Vaccine  03/15/2007    Abdominal Aortic Aneurysm Screening  03/15/2012     Medicare does not cover all immunizations to be given in the clinic. Check your benefits to ensure that you do not need to receive your immunizations at the pharmacy.          If you have had any of the above done at another facility, please bring the records or information with you so that your record at [facility] will be complete.  If you would like to schedule any of these, please contact me.     If you are currently taking medication, please bring it with you to your appointment for review.     Also, if you have any type of Advanced Directives, please bring them with you to your office visit so we may scan them into your chart.       Thank you for Choosing [facility] for your healthcare needs.        Additional Information  If you have questions, you can email [email or web address] or call [phone]  to talk to our [facility] staff. Remember, [facility] is NOT to be used for urgent needs. For medical emergencies, dial 911.

## 2018-05-25 ENCOUNTER — LAB VISIT (OUTPATIENT)
Dept: LAB | Facility: HOSPITAL | Age: 73
End: 2018-05-25
Attending: FAMILY MEDICINE
Payer: MEDICARE

## 2018-05-25 ENCOUNTER — TELEPHONE (OUTPATIENT)
Dept: PAIN MEDICINE | Facility: CLINIC | Age: 73
End: 2018-05-25

## 2018-05-25 ENCOUNTER — OFFICE VISIT (OUTPATIENT)
Dept: FAMILY MEDICINE | Facility: CLINIC | Age: 73
End: 2018-05-25
Attending: FAMILY MEDICINE
Payer: MEDICARE

## 2018-05-25 VITALS
WEIGHT: 127 LBS | DIASTOLIC BLOOD PRESSURE: 80 MMHG | HEART RATE: 55 BPM | OXYGEN SATURATION: 94 % | SYSTOLIC BLOOD PRESSURE: 116 MMHG | BODY MASS INDEX: 19.25 KG/M2 | HEIGHT: 68 IN

## 2018-05-25 DIAGNOSIS — R63.4 WEIGHT LOSS: ICD-10-CM

## 2018-05-25 DIAGNOSIS — R13.19 OTHER DYSPHAGIA: ICD-10-CM

## 2018-05-25 DIAGNOSIS — Z85.89 HISTORY OF HEAD AND NECK CANCER: ICD-10-CM

## 2018-05-25 DIAGNOSIS — I25.10 CORONARY ARTERY DISEASE INVOLVING NATIVE CORONARY ARTERY OF NATIVE HEART WITHOUT ANGINA PECTORIS: Primary | ICD-10-CM

## 2018-05-25 LAB
ALBUMIN SERPL BCP-MCNC: 3.5 G/DL
ALP SERPL-CCNC: 145 U/L
ALT SERPL W/O P-5'-P-CCNC: 27 U/L
ANION GAP SERPL CALC-SCNC: 11 MMOL/L
AST SERPL-CCNC: 34 U/L
BASOPHILS # BLD AUTO: 0.04 K/UL
BASOPHILS NFR BLD: 0.9 %
BILIRUB SERPL-MCNC: 0.3 MG/DL
BUN SERPL-MCNC: 12 MG/DL
CALCIUM SERPL-MCNC: 9.5 MG/DL
CHLORIDE SERPL-SCNC: 94 MMOL/L
CO2 SERPL-SCNC: 35 MMOL/L
CREAT SERPL-MCNC: 0.8 MG/DL
DIFFERENTIAL METHOD: ABNORMAL
EOSINOPHIL # BLD AUTO: 0.2 K/UL
EOSINOPHIL NFR BLD: 4.2 %
ERYTHROCYTE [DISTWIDTH] IN BLOOD BY AUTOMATED COUNT: 15.5 %
EST. GFR  (AFRICAN AMERICAN): >60 ML/MIN/1.73 M^2
EST. GFR  (NON AFRICAN AMERICAN): >60 ML/MIN/1.73 M^2
GLUCOSE SERPL-MCNC: 80 MG/DL
HCT VFR BLD AUTO: 38 %
HGB BLD-MCNC: 12.1 G/DL
IMM GRANULOCYTES # BLD AUTO: 0.01 K/UL
IMM GRANULOCYTES NFR BLD AUTO: 0.2 %
LYMPHOCYTES # BLD AUTO: 0.8 K/UL
LYMPHOCYTES NFR BLD: 19.2 %
MCH RBC QN AUTO: 30.7 PG
MCHC RBC AUTO-ENTMCNC: 31.8 G/DL
MCV RBC AUTO: 96 FL
MONOCYTES # BLD AUTO: 0.3 K/UL
MONOCYTES NFR BLD: 7 %
NEUTROPHILS # BLD AUTO: 2.9 K/UL
NEUTROPHILS NFR BLD: 68.5 %
NRBC BLD-RTO: 0 /100 WBC
PLATELET # BLD AUTO: 209 K/UL
PMV BLD AUTO: 11.1 FL
POTASSIUM SERPL-SCNC: 3.8 MMOL/L
PROT SERPL-MCNC: 7.3 G/DL
RBC # BLD AUTO: 3.94 M/UL
SODIUM SERPL-SCNC: 140 MMOL/L
TSH SERPL DL<=0.005 MIU/L-ACNC: 2.96 UIU/ML
WBC # BLD AUTO: 4.27 K/UL

## 2018-05-25 PROCEDURE — 99214 OFFICE O/P EST MOD 30 MIN: CPT | Mod: PBBFAC,PO | Performed by: FAMILY MEDICINE

## 2018-05-25 PROCEDURE — 99213 OFFICE O/P EST LOW 20 MIN: CPT | Mod: S$PBB,,, | Performed by: FAMILY MEDICINE

## 2018-05-25 PROCEDURE — 80053 COMPREHEN METABOLIC PANEL: CPT

## 2018-05-25 PROCEDURE — 86140 C-REACTIVE PROTEIN: CPT

## 2018-05-25 PROCEDURE — 84443 ASSAY THYROID STIM HORMONE: CPT

## 2018-05-25 PROCEDURE — 85025 COMPLETE CBC W/AUTO DIFF WBC: CPT

## 2018-05-25 PROCEDURE — 99999 PR PBB SHADOW E&M-EST. PATIENT-LVL IV: CPT | Mod: PBBFAC,,, | Performed by: FAMILY MEDICINE

## 2018-05-25 PROCEDURE — 36415 COLL VENOUS BLD VENIPUNCTURE: CPT | Mod: PO

## 2018-05-25 RX ORDER — ALPRAZOLAM 2 MG/1
2 TABLET ORAL 2 TIMES DAILY PRN
Qty: 60 TABLET | Refills: 0 | Status: SHIPPED | OUTPATIENT
Start: 2018-05-25

## 2018-05-25 NOTE — TELEPHONE ENCOUNTER
----- Message from Pina Lang sent at 5/25/2018  4:02 PM CDT -----  Contact: pt            Name of Who is Calling: roseanne      What is the request in detail: pt requesting a call back in reference to a phone call he received. I didn't see the encounter he was talking about. Please call pt and advise.      Can the clinic reply by [facility]: no      What Number to [facility] Back [facility]: [phone]

## 2018-05-25 NOTE — PROGRESS NOTES
Subjective:       Patient ID: Griffin Hall is a 73 y.o. male.    Chief Complaint: Follow-up    HPI     The patient returns for followup of his April hospitalization for angina.  LHC showed patent stent.  He has been pain-free since his hospitalization.      Patient Active Problem List   Diagnosis    Dysphagia    Opioid type dependence, continuous use    History of myocardial infarction    History of head and neck cancer    Current smoker    Coronary artery disease    History of coronary artery stent placement    Heart failure, systolic, chronic    Depression    Anxiety    Cervical stenosis of spinal canal    Cervical radiculopathy    Chronic pain    Osteoarthritis of lumbosacral spine with radiculopathy    Hypokalemia    Chest pain    Serologic abnormality       Current Outpatient Prescriptions:     albuterol (VENTOLIN HFA) 90 mcg/actuation inhaler, Inhale 2 puffs into the lungs every 6 (six) hours as needed for Wheezing., Disp: 1 Inhaler, Rfl: 6    ALPRAZolam (XANAX) 2 MG Tab, TAKE 1 TABLET (2 MG TOTAL) BY MOUTH 2 (TWO) TIMES DAILY AS NEEDED FOR ANXIETY., Disp: 60 tablet, Rfl: 0    aspirin (ECOTRIN) 81 MG EC tablet, Take 1 tablet (81 mg total) by mouth once daily., Disp: 90 tablet, Rfl: 3    atorvastatin (LIPITOR) 20 MG tablet, TAKE 1 TABLET (20 MG TOTAL) BY MOUTH ONCE DAILY., Disp: 90 tablet, Rfl: 3    benazepril (LOTENSIN) 5 MG tablet, Take 1 tablet (5 mg total) by mouth once daily., Disp: 90 tablet, Rfl: 3    fluticasone (FLONASE) 50 mcg/actuation nasal spray, 1 spray by Each Nare route as needed. , Disp: , Rfl:     furosemide (LASIX) 20 MG tablet, Take 1 tablet (20 mg total) by mouth once daily., Disp: 90 tablet, Rfl: 3    metoprolol succinate (TOPROL-XL) 50 MG 24 hr tablet, TAKE 1 TABLET BY MOUTH EVERY DAY, Disp: 90 tablet, Rfl: 3    oxyCODONE (ROXICODONE) 30 MG Tab, Take 1 tablet (30 mg total) by mouth every 8 (eight) hours as needed., Disp: 90 tablet, Rfl: 0    spironolactone  "(ALDACTONE) 25 MG tablet, Take 1 tablet (25 mg total) by mouth once daily., Disp: 30 tablet, Rfl: 11    The following portions of the patient's history were reviewed and updated as appropriate: allergies, past family history, past medical history, past social history and past surgical history    Review of Systems   Constitutional: Positive for fatigue and unexpected weight change. Negative for appetite change.         Objective:       /80 (BP Location: Right arm, Patient Position: Sitting, BP Method: Small (Manual))   Pulse (!) 55   Ht 5' 8" (1.727 m)   Wt 57.6 kg (127 lb)   SpO2 (!) 94%   BMI 19.31 kg/m²     Physical Exam   Constitutional: He is oriented to person, place, and time.   HENT:   Head: Normocephalic.   Eyes: Conjunctivae are normal.   Cardiovascular: Normal rate, regular rhythm and normal heart sounds.  Exam reveals no gallop.    No murmur heard.  Pulmonary/Chest: Effort normal and breath sounds normal. He has no wheezes. He has no rales.   Abdominal: Soft. He exhibits no distension. There is no tenderness.   Musculoskeletal: He exhibits no edema.   Neurological: He is alert and oriented to person, place, and time.   Skin: Skin is warm and dry.   Psychiatric: He has a normal mood and affect.   Vitals reviewed.        Assessment:       1. Coronary artery disease involving native coronary artery of native heart without angina pectoris    2. Other dysphagia    3. Weight loss    4. History of head and neck cancer          Plan:       Discussed possible nutritional supplemments to maintain muscle mass.  Patient open to gastrostomy tube if needed.but not at present.  Discuss with nutritionist.  Labs today.  We will call the patient with results & make further recommendations at that time.  For ES 3 days.    "This note will not be shared with the patient."  "

## 2018-05-25 NOTE — TELEPHONE ENCOUNTER
Contacted patient regarding message.    Patient stated he was returning a call and he thinks it's Janis or regarding the procedure scheduled on 05/28/18.     Patient was informed the procedure area nurse contacts the patient 2-3 days before the procedure and they might have called to confirm and provide instructions and answer any questions patient may have.    Staff offered the procedure area phone # to patient, however, he stated he had it already and he knew the time and instructions.    Patient acknowledged information given and expressed understanding.

## 2018-05-26 LAB — CRP SERPL-MCNC: 4.1 MG/L

## 2018-05-28 ENCOUNTER — HOSPITAL ENCOUNTER (OUTPATIENT)
Facility: OTHER | Age: 73
Discharge: HOME | End: 2018-05-28
Attending: ANESTHESIOLOGY | Admitting: ANESTHESIOLOGY
Payer: MEDICARE

## 2018-05-28 ENCOUNTER — TELEPHONE (OUTPATIENT)
Dept: FAMILY MEDICINE | Facility: CLINIC | Age: 73
End: 2018-05-28

## 2018-05-28 ENCOUNTER — SURGERY (OUTPATIENT)
Age: 73
End: 2018-05-28

## 2018-05-28 VITALS
BODY MASS INDEX: 18.2 KG/M2 | SYSTOLIC BLOOD PRESSURE: 103 MMHG | RESPIRATION RATE: 18 BRPM | HEIGHT: 71 IN | WEIGHT: 130 LBS | OXYGEN SATURATION: 98 % | DIASTOLIC BLOOD PRESSURE: 57 MMHG | HEART RATE: 65 BPM | TEMPERATURE: 99 F

## 2018-05-28 DIAGNOSIS — Z85.89 HISTORY OF HEAD AND NECK CANCER: ICD-10-CM

## 2018-05-28 DIAGNOSIS — I50.22 HEART FAILURE, SYSTOLIC, CHRONIC: ICD-10-CM

## 2018-05-28 DIAGNOSIS — G89.4 CHRONIC PAIN SYNDROME: Primary | ICD-10-CM

## 2018-05-28 DIAGNOSIS — R13.19 OTHER DYSPHAGIA: ICD-10-CM

## 2018-05-28 DIAGNOSIS — M47.24 OSTEOARTHRITIS OF SPINE WITH RADICULOPATHY, THORACIC REGION: Primary | ICD-10-CM

## 2018-05-28 DIAGNOSIS — G89.29 CHRONIC PAIN: ICD-10-CM

## 2018-05-28 PROCEDURE — 25000003 PHARM REV CODE 250: Performed by: ANESTHESIOLOGY

## 2018-05-28 PROCEDURE — 63600175 PHARM REV CODE 636 W HCPCS: Performed by: ANESTHESIOLOGY

## 2018-05-28 PROCEDURE — 25500020 PHARM REV CODE 255: Performed by: ANESTHESIOLOGY

## 2018-05-28 PROCEDURE — 62321 NJX INTERLAMINAR CRV/THRC: CPT | Performed by: ANESTHESIOLOGY

## 2018-05-28 PROCEDURE — 62321 NJX INTERLAMINAR CRV/THRC: CPT | Mod: ,,, | Performed by: ANESTHESIOLOGY

## 2018-05-28 RX ORDER — SODIUM CHLORIDE 9 MG/ML
500 INJECTION, SOLUTION INTRAVENOUS CONTINUOUS
Status: DISCONTINUED | OUTPATIENT
Start: 2018-05-28 | End: 2018-05-28 | Stop reason: HOSPADM

## 2018-05-28 RX ORDER — DEXAMETHASONE SODIUM PHOSPHATE 100 MG/10ML
INJECTION INTRAMUSCULAR; INTRAVENOUS
Status: DISCONTINUED | OUTPATIENT
Start: 2018-05-28 | End: 2018-05-28 | Stop reason: HOSPADM

## 2018-05-28 RX ORDER — LIDOCAINE HYDROCHLORIDE 10 MG/ML
INJECTION INFILTRATION; PERINEURAL
Status: DISCONTINUED | OUTPATIENT
Start: 2018-05-28 | End: 2018-05-28 | Stop reason: HOSPADM

## 2018-05-28 RX ORDER — LIDOCAINE HYDROCHLORIDE 10 MG/ML
INJECTION, SOLUTION EPIDURAL; INFILTRATION; INTRACAUDAL; PERINEURAL
Status: DISCONTINUED | OUTPATIENT
Start: 2018-05-28 | End: 2018-05-28 | Stop reason: HOSPADM

## 2018-05-28 RX ORDER — ALPRAZOLAM 0.5 MG/1
0.5 TABLET, ORALLY DISINTEGRATING ORAL NIGHTLY PRN
Status: DISCONTINUED | OUTPATIENT
Start: 2018-05-28 | End: 2018-05-28 | Stop reason: HOSPADM

## 2018-05-28 RX ADMIN — DEXAMETHASONE SODIUM PHOSPHATE 10 MG: 10 INJECTION INTRAMUSCULAR; INTRAVENOUS at 02:05

## 2018-05-28 RX ADMIN — IOHEXOL 5 ML: 300 INJECTION, SOLUTION INTRAVENOUS at 02:05

## 2018-05-28 RX ADMIN — LIDOCAINE HYDROCHLORIDE 10 ML: 10 INJECTION, SOLUTION EPIDURAL; INFILTRATION; INTRACAUDAL; PERINEURAL at 02:05

## 2018-05-28 RX ADMIN — ALPRAZOLAM 0.5 MG: 0.5 TABLET, ORALLY DISINTEGRATING ORAL at 01:05

## 2018-05-28 RX ADMIN — LIDOCAINE HYDROCHLORIDE 10 ML: 10 INJECTION, SOLUTION INFILTRATION; PERINEURAL at 02:05

## 2018-05-28 NOTE — DISCHARGE INSTRUCTIONS
Home Care Instructions Pain Management:    1. DIET:   You may resume your normal diet today.   2. BATHING:   You may shower with luke warm water.  3. DRESSING:   You may remove your bandage today.   4. ACTIVITY LEVEL:   You may resume your normal activities 24 hrs after your procedure.  5. MEDICATIONS:   You may resume your normal medications today.   6. SPECIAL INSTRUCTIONS:   No heat to the injection site for 24 hrs including, bath or shower, heating pad, moist heat, or hot tubs.    Use ice pack to injection site for any pain or discomfort.  Apply ice packs for 20 minute intervals as needed.   If you have received any sedatives by mouth today you may not drive for 12 hours.    If you have received any sedation through your IV, you may not drive for 24 hrs.     PLEASE CALL YOUR DOCTOR IF:  1. Redness or swelling around the injection site.  2. Fever of 101 degrees  3. Drainage (pus) from the injection site.  4. For any continuous bleeding (some dried blood over the incision is normal.)    FOR EMERGENCIES:   If any unusual problems or difficulties occur during clinic hours, call [phone] or [phone].     Thank you for allowing us to care for you today. You may receive a survey about the care we provided. Your feedback is valuable and helps us provide excellent care throughout the community.

## 2018-05-28 NOTE — DISCHARGE SUMMARY
Discharge Diagnosis:Cervical djd with radiculopathy  DDD (degenerative disc disease), cervical [M50.30]  DDD (degenerative disc disease), thoracic [M51.34]  Condition on Discharge: Stable.  Diet on Discharge: Same as before.  Activity: as per instruction sheet.  Discharge to: Home with a responsible adult.  Follow up: 2-4 weeks &/or as per Discharge instructions

## 2018-05-28 NOTE — TELEPHONE ENCOUNTER
----- Message from Dawit Contreras MD sent at 5/28/2018  4:34 PM CDT -----  Deepa Hooker, the cancer center nutritionist. I have cc'd her.       How remote was her cancer? You could also consider an esophagram and even CT scans of the neck and chest.    Thanks  B      ----- Message -----  From: Jamarcus Sinha Jr., MD  Sent: 5/26/2018  12:24 PM  To: MD Dawit Gonzalez:  Hope all is well with you.  This patient with prior history of head/neck cancer.  Experiencing weight loss due to dysphasia.  Appetite remains good.  Awaiting additional nutrtional markers.  I would like to get a nutritional consultation.  Any recommendations?    Tom Sinha

## 2018-05-28 NOTE — INTERVAL H&P NOTE
HPI  Patient is here today for planned T1-T2 ILESI.  Denies recent fever, chills, nausea/vomiting, new onset numbness/weakness in BLE, saddle anesthesia, new onset bowel/bladder incontinence.    Past Medical History:   Diagnosis Date    Anxiety     Coronary artery disease     Degenerative disc disease     Depression     Esophageal cancer     Tobacco use 6/16/2016       Past Surgical History:   Procedure Laterality Date    CERVICAL FUSION N/A     CORONARY ANGIOPLASTY WITH STENT PLACEMENT      HAND TENDON SURGERY      THROAT SURGERY  2005    TONSILLECTOMY         Review of patient's allergies indicates:   Allergen Reactions    Codeine Itching    Lyrica [pregabalin] Hallucinations       Current Facility-Administered Medications   Medication Dose Route Frequency Provider Last Rate Last Dose    0.9%  NaCl infusion  500 mL Intravenous Continuous Wallace Castorena MD           Family History   Problem Relation Age of Onset    Stomach cancer Mother     Lung cancer Father     Cancer Maternal Aunt     Cancer Maternal Uncle     Cancer Paternal Uncle     Dementia Sister     Testicular cancer Brother        Social History     Social History    Marital status:      Spouse name: N/A    Number of children: 4    Years of education: N/A     Occupational History    retired restaurant owner      Social History Main Topics    Smoking status: Current Every Day Smoker     Packs/day: 2.00     Years: 50.00     Types: Cigarettes     Start date: 3/15/1967    Smokeless tobacco: Never Used      Comment: 7/2016 1ppd, down from 2ppd    Alcohol use No    Drug use: No    Sexual activity: Yes     Partners: Male     Other Topics Concern    Not on file     Social History Narrative    The patient does exercise regularly (walks QOD).      He is not satisfied with weight.    Rates diet as fair.    He does not drink at least 1/2 gallon water daily.    He drinks 6 coffee/tea/caffeine-containing soft drinks daily.     Total sleep time at night is 3-4 hours.    He does wear seat belts.    Hobbies include none.             ROS negative except pain complaints in HPI    OBJECTIVE:    There were no vitals taken for this visit.    PHYSICAL EXAMINATION:    GENERAL: Well appearing, in no acute distress, alert and oriented x3.  PSYCH:  Mood and affect appropriate.  SKIN: Skin color, texture, turgor normal, no rashes or lesions.  CV: distal pulses intact  PULM: No evidence of respiratory difficulty, symmetric chest rise. Clear to auscultation.  NEURO: Cranial nerves grossly intact.    Plan:    Proceed with T1-T2 DARRION Castorena  05/28/2018

## 2018-05-28 NOTE — OP NOTE
Cervical Interlaminar Epidural Steroid Injection under fluoroscopic guidance.  Time-out taken to identify patient and procedure side prior to starting the procedure.   I attest that I have reviewed the patient's home medications prior to the procedure and no contraindication have been identified. I  re-evaluated the patient after the patient was positioned for the procedure in the procedure room immediately before the procedural time-out. The vital signs are current and represent the current state of the patient which has not significantly changed since the preprocedure assessment.                                                              Date of Service: 05/28/2018    PCP: Jamarcus Sinha Jr, MD      Procedure: T1-T2 cervical interlaminar epidural steroid injection under fluoroscopy.  Reasons for procedure: Cervical radiculopathy [M54.12]  DDD (degenerative disc disease), cervical [M50.30]  DDD (degenerative disc disease), thoracic [M51.34]  1. Osteoarthritis of spine with radiculopathy, thoracic region    2. Chronic pain      POSTOP DIAGNOSIS:  Cervical radiculopathy [M54.12]  DDD (degenerative disc disease), cervical [M50.30]  DDD (degenerative disc disease), thoracic [M51.34]     1. Osteoarthritis of spine with radiculopathy, thoracic region    2. Chronic pain      Physician: Noel Dietz MD  ASSISTANTS: Wallace Castorena MD; David Osorio DO    Medications injected:  Preservative-free dexamethasone 10mg and Xylocaine 1% MPF 1ml.  This was followed by a slow injection of 4 mL sterile, preservative-free normal saline.    Local anesthetic used: Xylocaine 1% 9ml with Sodium Bicarbonate 1ml.     Sedation Medications: None    Complications:  none.    Estimated blood loss: none.    Technique:  With the patient laying in a prone position with the neck in a flexed forward position, the area was prepped and draped in the usual sterile fashion using ChloraPrep and a fenestrated drape.  The area was determined under fluoroscopic  guidance.  Local anesthetic was given using a 27-gauge needle by raising a wheal and going down to the osseous elements of the cervical spine.  A 3.5 inch 20-gauge Touhy needle was introduced under fluoroscopic guidance to meet the lamina of T1.  The needle was then hinged under the lamina then advanced using loss of resistance technique.  Once the tip of the needle was in the desired position, the contrast dye Omnipaque was injected to determine placement and no vascular runoff.  Digital subtraction was employed to confirm that there is no vascular runoff.  The steroid was then injected slowly followed by a slow injection of the 4 mL of the sterile preservative-free normal saline.  The patient tolerated the procedure well.    Pain before the procedure: 8/10    Pain after the procedure: 0/10    The patient was monitored after the procedure and was given post-procedure and discharge instructions to follow at home. The patient was discharged in a stable condition

## 2018-05-29 ENCOUNTER — TELEPHONE (OUTPATIENT)
Dept: FAMILY MEDICINE | Facility: CLINIC | Age: 73
End: 2018-05-29

## 2018-05-29 NOTE — TELEPHONE ENCOUNTER
Please inform patient that nutritionist should be contacting him soon  Lab tests showed no serious nutritional deficits at present.

## 2018-05-30 ENCOUNTER — TELEPHONE (OUTPATIENT)
Dept: HEMATOLOGY/ONCOLOGY | Facility: CLINIC | Age: 73
End: 2018-05-30

## 2018-06-05 ENCOUNTER — DOCUMENTATION ONLY (OUTPATIENT)
Dept: HEMATOLOGY/ONCOLOGY | Facility: CLINIC | Age: 73
End: 2018-06-05

## 2018-06-05 NOTE — PROGRESS NOTES
Called patient to inquire about transportation needs for [facility]. There was no answer. I left a detailed message along with direct contact number.

## 2018-06-07 ENCOUNTER — OFFICE VISIT (OUTPATIENT)
Dept: PAIN MEDICINE | Facility: CLINIC | Age: 73
End: 2018-06-07
Payer: MEDICARE

## 2018-06-07 VITALS
TEMPERATURE: 98 F | WEIGHT: 123.88 LBS | SYSTOLIC BLOOD PRESSURE: 91 MMHG | DIASTOLIC BLOOD PRESSURE: 59 MMHG | HEIGHT: 71 IN | BODY MASS INDEX: 17.34 KG/M2 | HEART RATE: 60 BPM

## 2018-06-07 DIAGNOSIS — Z85.89 HISTORY OF HEAD AND NECK CANCER: ICD-10-CM

## 2018-06-07 DIAGNOSIS — S32.020D CLOSED COMPRESSION FRACTURE OF L2 LUMBAR VERTEBRA, WITH ROUTINE HEALING, SUBSEQUENT ENCOUNTER: ICD-10-CM

## 2018-06-07 DIAGNOSIS — M48.02 CERVICAL STENOSIS OF SPINAL CANAL: ICD-10-CM

## 2018-06-07 DIAGNOSIS — M54.12 CERVICAL RADICULOPATHY: ICD-10-CM

## 2018-06-07 DIAGNOSIS — M50.30 DDD (DEGENERATIVE DISC DISEASE), CERVICAL: ICD-10-CM

## 2018-06-07 DIAGNOSIS — M47.26 OSTEOARTHRITIS OF SPINE WITH RADICULOPATHY, LUMBAR REGION: ICD-10-CM

## 2018-06-07 DIAGNOSIS — M54.17 LUMBOSACRAL RADICULOPATHY: ICD-10-CM

## 2018-06-07 DIAGNOSIS — G89.4 CHRONIC PAIN SYNDROME: ICD-10-CM

## 2018-06-07 DIAGNOSIS — M51.36 DDD (DEGENERATIVE DISC DISEASE), LUMBAR: ICD-10-CM

## 2018-06-07 PROCEDURE — 99214 OFFICE O/P EST MOD 30 MIN: CPT | Mod: S$PBB,,, | Performed by: NURSE PRACTITIONER

## 2018-06-07 PROCEDURE — 99999 PR PBB SHADOW E&M-EST. PATIENT-LVL IV: CPT | Mod: PBBFAC,,, | Performed by: NURSE PRACTITIONER

## 2018-06-07 PROCEDURE — 99214 OFFICE O/P EST MOD 30 MIN: CPT | Mod: PBBFAC | Performed by: NURSE PRACTITIONER

## 2018-06-07 RX ORDER — GABAPENTIN 300 MG/1
600 CAPSULE ORAL 3 TIMES DAILY
Qty: 180 CAPSULE | Refills: 3 | Status: SHIPPED | OUTPATIENT
Start: 2018-06-07 | End: 2019-06-07

## 2018-06-07 RX ORDER — OXYCODONE HYDROCHLORIDE 30 MG/1
30 TABLET ORAL EVERY 8 HOURS PRN
Qty: 90 TABLET | Refills: 0 | Status: SHIPPED | OUTPATIENT
Start: 2018-06-08 | End: 2018-06-28 | Stop reason: SDUPTHER

## 2018-06-07 NOTE — PROGRESS NOTES
Chronic Pain - Follow Up     Referring Physician: Ayden Almonte MD     Chief Complaint   Patient presents with    Follow-up         SUBJECTIVE: Disclaimer: This note has been generated using voice-recognition software. There may be typographical errors that have been missed during proof-reading      The patient presents today for follow up for neck and low back pain. He is s/p T1-2 IL VALERI on 5/28/2018. He reports 30% relief of his pain which he is very satisfied with. He continues to report neck pain today this is stiff in nature. He does report intermittent radiating pain down the both arms, right greater than left. He continues to report low back pain that radiates down the side of his legs to his ankles. He continues to report benefit with previous lumbar VALERI. He continues to report benefit with current medication regimen. He does take Gabapentin with some benefit. He continues to take Oxycodone with benefit and without adverse effects. He does break the pills in half and takes them with Jello since a previous barium swallow proved pills were becoming lodged in the vallecula. 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 He does take Soma and Celebrex per Dr. Alfonso. He denies any other health changes. He denies any bowel or bladder incontinence. His pain today is 7/10.       Pain Medications:  Current:  Oxycodone 30mg TID  Gabapentin  Carloz- Dr. Alfonso     Tried in Past:  NSAIDs - Celebrex and Ibuprofen  TCA - Yes  SNRI - Yes  Anti-convulsants -Gabapentin and Lyrica  Muscle Relaxants - Soma  Opioids- oxycodone     Physical Therapy/Home Exercise: no       report: Reviewed and consistent with medication use as prescribed.     Pain Procedures:   ESIs in the past (1994)  8/7/17 T1-T2 IL VALERI- 50% relief for one week  9/14/2017- L4-5 IL VALERI- no relief  10/2/2017- T1-2 IL VALERI  2/19/2018- L4-5 IL VALERI  5/28/2018- T1-2 IL VALERI     Chiropractor -never  Acupuncture -  never  TENS unit -never  Spinal decompression -Cervical fusion  Joint replacement -never    Imaging:   MRI Cervical Spine 1/19/2017:  Stable anterior and interbody fusion of C5-6. 2 mm anterolisthesis at C4 which were not present on the prior study. Spondylosis at multiple cervical levels which has worsened since the prior study in 2012.     C2-3: A posterior disc protrusion is present causing moderate spinal stenosis, effacement of the anterior thecal sac and flattening of the anterior cervical cord.     C3-4: Posterior disc protrusion which causes moderate spinal stenosis, effacement of the anterior thecal sac, and flattening of the anterior cervical cord.     C4-5: Posterior disc osteophyte complex which has worsened significantly since the prior study and now causes moderate spinal stenosis, effacement of the anterior thecal sac, and flattening of the anterior cervical cord.    C6-7: Posterior disc osteophyte complex is stable when compared to the prior study. Thickening of the ligamentum flavum has worsened. Moderate spinal stenosis with effacement of the anterior thecal sac. Moderate to severe right-sided neural foraminal stenosis with suspected impingement of the exiting nerve root.       MRI Thoracic Spine 1/19/2017:  An exaggerated thoracic kyphosis. Old superior endplate compression fracture to the T4 vertebral body. Superior endplate Schmorl's nodes are present at T1 and T2. Disc bulge at T3-4 which does not cause significant spinal or neural foraminal stenosis. Thick potentially calcified pleural plaques are present bilaterally along with airspace disease, atelectasis, and pulmonary nodularity. Dedicated imaging of the chest is recommended if this has not already been done.     MRI Lumbar Spine 1/19/2017:  A superior endplate compression fracture is present to the L2 vertebral body which has healed with a residual 30% loss of height. 2 mm retrolisthesis at L2.     L1-2: Annular bulge without spinal or  neural foraminal stenosis.     L2-3 Annular bulge with mild spinal stenosis and no neural foraminal stenosis.    L3-4: Annular bulge with mild spinal stenosis and mild bilateral neural foraminal stenosis.     L4-5: Annular bulge and small posterior annular tear seen on image 11 of series 3. An annular defect is present to the anterior right lateral portion of the disc and was not seen on the prior study. Mild spinal stenosis and moderate bilateral neural foraminal stenosis.     L5-S1: Annular bulge with no spinal stenosis and mild left-sided neural foraminal stenosis.       CMP  Sodium   Date Value Ref Range Status   05/25/2018 140 136 - 145 mmol/L Final     Potassium   Date Value Ref Range Status   05/25/2018 3.8 3.5 - 5.1 mmol/L Final     Chloride   Date Value Ref Range Status   05/25/2018 94 (L) 95 - 110 mmol/L Final     CO2   Date Value Ref Range Status   05/25/2018 35 (H) 23 - 29 mmol/L Final     Glucose   Date Value Ref Range Status   05/25/2018 80 70 - 110 mg/dL Final     BUN, Bld   Date Value Ref Range Status   05/25/2018 12 8 - 23 mg/dL Final     Creatinine   Date Value Ref Range Status   05/25/2018 0.8 0.5 - 1.4 mg/dL Final     Calcium   Date Value Ref Range Status   05/25/2018 9.5 8.7 - 10.5 mg/dL Final     Total Protein   Date Value Ref Range Status   05/25/2018 7.3 6.0 - 8.4 g/dL Final     Albumin   Date Value Ref Range Status   05/25/2018 3.5 3.5 - 5.2 g/dL Final     Total Bilirubin   Date Value Ref Range Status   05/25/2018 0.3 0.1 - 1.0 mg/dL Final     Comment:     For infants and newborns, interpretation of results should be based  on gestational age, weight and in agreement with clinical  observations.  Premature Infant recommended reference ranges:  Up to 24 hours.............<8.0 mg/dL  Up to 48 hours............<12.0 mg/dL  3-5 days..................<15.0 mg/dL  6-29 days.................<15.0 mg/dL       Alkaline Phosphatase   Date Value Ref Range Status   05/25/2018 145 (H) 55 - 135 U/L Final  "    AST   Date Value Ref Range Status   05/25/2018 34 10 - 40 U/L Final     ALT   Date Value Ref Range Status   05/25/2018 27 10 - 44 U/L Final     Anion Gap   Date Value Ref Range Status   05/25/2018 11 8 - 16 mmol/L Final     eGFR if    Date Value Ref Range Status   05/25/2018 >60.0 >60 mL/min/1.73 m^2 Final     eGFR if non    Date Value Ref Range Status   05/25/2018 >60.0 >60 mL/min/1.73 m^2 Final     Comment:     Calculation used to obtain the estimated glomerular filtration  rate (eGFR) is the CKD-EPI equation.             REVIEW OF SYSTEMS:     GENERAL:  Weight loss and decreased appetite  HEENT:  No recent changes in vision or hearing  NECK:  difficulty with swallowing.  RESPIRATORY: Negative for cough, wheezing or shortness of breath, patient denies any recent URI.  CARDIOVASCULAR: Negative for chest pain, leg swelling or palpitations.  GI: Negative for abdominal discomfort, blood in stools or black stools or change in bowel habits.  MUSCULOSKELETAL: See HPI.  SKIN: Negative for lesions, rash, and itching.  PSYCH:  Depression and anxiety.  Patient's sleep is disturbed secondary to pain.  HEMATOLOGY/LYMPHOLOGY: Negative for prolonged bleeding, bruising easily or swollen nodes.   ENDO: No history of diabetes or thyroid dysfunction  NEURO: No history of headaches, syncope, paralysis, seizures or tremors.  All other reviewed and negative other than HPI.     OBJECTIVE:    BP (!) 91/59   Pulse 60   Temp 98.3 °F (36.8 °C)   Ht 5' 11" (1.803 m)   Wt 56.2 kg (123 lb 14.4 oz)   BMI 17.28 kg/m²     PHYSICAL EXAMINATION:    GENERAL: Well appearing, in no acute distress, alert and oriented x3.  PSYCH:  Mood and affect appropriate.  SKIN: Skin color, texture, turgor normal, no rashes or lesions.  HEAD/FACE:  Normocephalic, atraumatic. Cranial nerves grossly intact.  NECK: Mild pain to palpation over the cervical paraspinous muscles. There is pain with palpation over cervical spine. " Spurling positive on right side. Limited ROM with pain on extension. Positive facet loading bilaterally.  CV: RRR with palpation of the radial artery.  PULM: No evidence of respiratory difficulty, symmetric chest rise.  BACK: Straight leg raise in the supine position is negative for radicular pain bilaterally. There is pain with palpation over lumbar spine. Limited ROM with pain on extension. Positive facet loading bilaterally.   EXTREMITIES: Peripheral joint ROM is full and pain free without obvious instability or laxity in all four extremities. Trigger finger to left middle finger.  Good capillary refill. +2 pitting edema to bilateral ankles.   MUSCULOSKELETAL:  No atrophy or tone abnormalities are noted.  NEURO: Bilateral upper extremity coordination and muscle stretch reflexes are physiologic and symmetric.  Darren's negative. No clonus.  No loss of sensation is noted.  GAIT: Antalgic, ambulates without assistance      ASSESSMENT: 70 y.o. year old male with pain, consistent with      Encounter Diagnoses   Name Primary?    Chronic pain syndrome     Osteoarthritis of spine with radiculopathy, lumbar region     Lumbosacral radiculopathy     Closed compression fracture of L2 lumbar vertebra, with routine healing, subsequent encounter     DDD (degenerative disc disease), lumbar     Cervical radiculopathy     Cervical stenosis of spinal canal     DDD (degenerative disc disease), cervical     History of head and neck cancer         PLAN:     - Previous imaging was reviewed and discussed with the patient today. Previous CMP reviewed.     - He is s/p T1-2 IL VALERI with benefit. We can repeat this as needed.     - He is s/p L4-5 IL VALERI with benefit. We can repeat this in the future.     - Increase Gabapentin to 600 mg TID. Titration instructions provided today.     - Oxycodone 30 mg TID PRN pain, #90, 0 refills. We discussed breaking the pills in half and taking with pudding due to impaired swallowing. Refill  provided today.     - The patient is here today for a refill of current pain medications and they believe these provide effective pain control and improvements in quality of life.  The patient notes no serious side effects, and feels the benefits outweigh the risks.  The patient was reminded of the pain contract that they signed previously as well as the risks and benefits of the medication including possible death.  The updated [place] [employer]  Pharmacy prescription monitoring program was reviewed, and the patient has been found to be compliant with current treatment plan. Medication management provided by Dr. Dietz.     - UDS from 3/7/2018 reviewed and consistant.     - Can continue Soma and Celebrex from Dr. Alfonso.    - RTC in 1 month.     - The patient will continue a home exercise routine to help with pain and strengthening.      - Dr. Dietz was consulted on the patient and agrees with this plan.    The above plan and management options were discussed at length with patient. Patient is in agreement with the above and verbalized understanding.     Janis Loera NP  06/07/2018

## 2018-06-13 ENCOUNTER — TELEPHONE (OUTPATIENT)
Dept: HEMATOLOGY/ONCOLOGY | Facility: CLINIC | Age: 73
End: 2018-06-13

## 2018-06-28 ENCOUNTER — OFFICE VISIT (OUTPATIENT)
Dept: PAIN MEDICINE | Facility: CLINIC | Age: 73
End: 2018-06-28
Payer: MEDICARE

## 2018-06-28 VITALS
DIASTOLIC BLOOD PRESSURE: 79 MMHG | HEIGHT: 71 IN | TEMPERATURE: 98 F | WEIGHT: 128.5 LBS | BODY MASS INDEX: 17.99 KG/M2 | HEART RATE: 65 BPM | SYSTOLIC BLOOD PRESSURE: 122 MMHG

## 2018-06-28 DIAGNOSIS — M48.02 CERVICAL STENOSIS OF SPINAL CANAL: ICD-10-CM

## 2018-06-28 DIAGNOSIS — Z85.89 HISTORY OF HEAD AND NECK CANCER: ICD-10-CM

## 2018-06-28 DIAGNOSIS — M54.17 LUMBOSACRAL RADICULOPATHY: Primary | ICD-10-CM

## 2018-06-28 DIAGNOSIS — M51.36 DDD (DEGENERATIVE DISC DISEASE), LUMBAR: ICD-10-CM

## 2018-06-28 DIAGNOSIS — M50.30 DDD (DEGENERATIVE DISC DISEASE), CERVICAL: ICD-10-CM

## 2018-06-28 DIAGNOSIS — M54.12 CERVICAL RADICULOPATHY: ICD-10-CM

## 2018-06-28 DIAGNOSIS — S32.020D CLOSED COMPRESSION FRACTURE OF L2 LUMBAR VERTEBRA, WITH ROUTINE HEALING, SUBSEQUENT ENCOUNTER: ICD-10-CM

## 2018-06-28 DIAGNOSIS — M47.26 OSTEOARTHRITIS OF SPINE WITH RADICULOPATHY, LUMBAR REGION: ICD-10-CM

## 2018-06-28 DIAGNOSIS — M54.17 LUMBOSACRAL RADICULOPATHY: ICD-10-CM

## 2018-06-28 DIAGNOSIS — G89.4 CHRONIC PAIN SYNDROME: ICD-10-CM

## 2018-06-28 DIAGNOSIS — M47.24 OSTEOARTHRITIS OF SPINE WITH RADICULOPATHY, THORACIC REGION: ICD-10-CM

## 2018-06-28 PROCEDURE — 99999 PR PBB SHADOW E&M-EST. PATIENT-LVL III: CPT | Mod: PBBFAC,,, | Performed by: NURSE PRACTITIONER

## 2018-06-28 PROCEDURE — 99214 OFFICE O/P EST MOD 30 MIN: CPT | Mod: S$PBB,,, | Performed by: NURSE PRACTITIONER

## 2018-06-28 PROCEDURE — 99213 OFFICE O/P EST LOW 20 MIN: CPT | Mod: PBBFAC | Performed by: NURSE PRACTITIONER

## 2018-06-28 RX ORDER — OXYCODONE HYDROCHLORIDE 30 MG/1
30 TABLET ORAL EVERY 8 HOURS PRN
Qty: 90 TABLET | Refills: 0 | Status: SHIPPED | OUTPATIENT
Start: 2018-07-05 | End: 2018-08-04

## 2018-06-28 NOTE — PROGRESS NOTES
Chronic Pain - Follow Up     Referring Physician: Ayden Almonte MD     Chief Complaint   Patient presents with    Neck Pain    Back Pain         SUBJECTIVE: Disclaimer: This note has been generated using voice-recognition software. There may be typographical errors that have been missed during proof-reading      The patient presents today for follow up for neck and low back pain. He continues to report low back pain that radiates down the side of both legs to his ankles. He describes this pain as shooting and tingling in nature. He also reports neck pain with intermittent radiating pain into both arms, right greater than left. He continues to report benefit with current medication regimen. He takes Gabapentin with benefit. He also takes Oxycodone with benefit and without adverse effects. He does break the pills in half and takes them with Jello since a previous barium swallow proved pills were becoming lodged in the vallecula. 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 He does take Soma and Celebrex per Dr. Alfonso. He denies any other health changes. He denies any bowel or bladder incontinence. His pain today is 9/10.       Pain Medications:  Current:  Oxycodone 30mg TID  Gabapentin  Carloz- Dr. Alfonso     Tried in Past:  NSAIDs - Celebrex and Ibuprofen  TCA - Yes  SNRI - Yes  Anti-convulsants -Gabapentin and Lyrica  Muscle Relaxants - Soma  Opioids- oxycodone     Physical Therapy/Home Exercise: no       report: Reviewed and consistent with medication use as prescribed.     Pain Procedures:   ESIs in the past (1994)  8/7/17 T1-T2 IL VALERI- 50% relief for one week  9/14/2017- L4-5 IL VALERI- no relief  10/2/2017- T1-2 IL VALERI  2/19/2018- L4-5 IL VALERI  5/28/2018- T1-2 IL VALERI     Chiropractor -never  Acupuncture - never  TENS unit -never  Spinal decompression -Cervical fusion  Joint replacement -never    Imaging:   MRI Cervical Spine 1/19/2017:  Stable anterior and  interbody fusion of C5-6. 2 mm anterolisthesis at C4 which were not present on the prior study. Spondylosis at multiple cervical levels which has worsened since the prior study in 2012.     C2-3: A posterior disc protrusion is present causing moderate spinal stenosis, effacement of the anterior thecal sac and flattening of the anterior cervical cord.     C3-4: Posterior disc protrusion which causes moderate spinal stenosis, effacement of the anterior thecal sac, and flattening of the anterior cervical cord.     C4-5: Posterior disc osteophyte complex which has worsened significantly since the prior study and now causes moderate spinal stenosis, effacement of the anterior thecal sac, and flattening of the anterior cervical cord.    C6-7: Posterior disc osteophyte complex is stable when compared to the prior study. Thickening of the ligamentum flavum has worsened. Moderate spinal stenosis with effacement of the anterior thecal sac. Moderate to severe right-sided neural foraminal stenosis with suspected impingement of the exiting nerve root.       MRI Thoracic Spine 1/19/2017:  An exaggerated thoracic kyphosis. Old superior endplate compression fracture to the T4 vertebral body. Superior endplate Schmorl's nodes are present at T1 and T2. Disc bulge at T3-4 which does not cause significant spinal or neural foraminal stenosis. Thick potentially calcified pleural plaques are present bilaterally along with airspace disease, atelectasis, and pulmonary nodularity. Dedicated imaging of the chest is recommended if this has not already been done.     MRI Lumbar Spine 1/19/2017:  A superior endplate compression fracture is present to the L2 vertebral body which has healed with a residual 30% loss of height. 2 mm retrolisthesis at L2.     L1-2: Annular bulge without spinal or neural foraminal stenosis.     L2-3 Annular bulge with mild spinal stenosis and no neural foraminal stenosis.    L3-4: Annular bulge with mild spinal  stenosis and mild bilateral neural foraminal stenosis.     L4-5: Annular bulge and small posterior annular tear seen on image 11 of series 3. An annular defect is present to the anterior right lateral portion of the disc and was not seen on the prior study. Mild spinal stenosis and moderate bilateral neural foraminal stenosis.     L5-S1: Annular bulge with no spinal stenosis and mild left-sided neural foraminal stenosis.       CMP  Sodium   Date Value Ref Range Status   05/25/2018 140 136 - 145 mmol/L Final     Potassium   Date Value Ref Range Status   05/25/2018 3.8 3.5 - 5.1 mmol/L Final     Chloride   Date Value Ref Range Status   05/25/2018 94 (L) 95 - 110 mmol/L Final     CO2   Date Value Ref Range Status   05/25/2018 35 (H) 23 - 29 mmol/L Final     Glucose   Date Value Ref Range Status   05/25/2018 80 70 - 110 mg/dL Final     BUN, Bld   Date Value Ref Range Status   05/25/2018 12 8 - 23 mg/dL Final     Creatinine   Date Value Ref Range Status   05/25/2018 0.8 0.5 - 1.4 mg/dL Final     Calcium   Date Value Ref Range Status   05/25/2018 9.5 8.7 - 10.5 mg/dL Final     Total Protein   Date Value Ref Range Status   05/25/2018 7.3 6.0 - 8.4 g/dL Final     Albumin   Date Value Ref Range Status   05/25/2018 3.5 3.5 - 5.2 g/dL Final     Total Bilirubin   Date Value Ref Range Status   05/25/2018 0.3 0.1 - 1.0 mg/dL Final     Comment:     For infants and newborns, interpretation of results should be based  on gestational age, weight and in agreement with clinical  observations.  Premature Infant recommended reference ranges:  Up to 24 hours.............<8.0 mg/dL  Up to 48 hours............<12.0 mg/dL  3-5 days..................<15.0 mg/dL  6-29 days.................<15.0 mg/dL       Alkaline Phosphatase   Date Value Ref Range Status   05/25/2018 145 (H) 55 - 135 U/L Final     AST   Date Value Ref Range Status   05/25/2018 34 10 - 40 U/L Final     ALT   Date Value Ref Range Status   05/25/2018 27 10 - 44 U/L Final  "    Anion Gap   Date Value Ref Range Status   05/25/2018 11 8 - 16 mmol/L Final     eGFR if    Date Value Ref Range Status   05/25/2018 >60.0 >60 mL/min/1.73 m^2 Final     eGFR if non    Date Value Ref Range Status   05/25/2018 >60.0 >60 mL/min/1.73 m^2 Final     Comment:     Calculation used to obtain the estimated glomerular filtration  rate (eGFR) is the CKD-EPI equation.             REVIEW OF SYSTEMS:     GENERAL:  Weight loss and decreased appetite  HEENT:  No recent changes in vision or hearing  NECK:  difficulty with swallowing.  RESPIRATORY: Negative for cough, wheezing or shortness of breath, patient denies any recent URI.  CARDIOVASCULAR: Negative for chest pain, leg swelling or palpitations.  GI: Negative for abdominal discomfort, blood in stools or black stools or change in bowel habits.  MUSCULOSKELETAL: See HPI.  SKIN: Negative for lesions, rash, and itching.  PSYCH:  Depression and anxiety.  Patient's sleep is disturbed secondary to pain.  HEMATOLOGY/LYMPHOLOGY: Negative for prolonged bleeding, bruising easily or swollen nodes.   ENDO: No history of diabetes or thyroid dysfunction  NEURO: No history of headaches, syncope, paralysis, seizures or tremors.  All other reviewed and negative other than HPI.     OBJECTIVE:    /79 (BP Location: Right arm, Patient Position: Sitting, BP Method: Small (Automatic))   Pulse 65   Temp 98.3 °F (36.8 °C)   Ht 5' 11" (1.803 m)   Wt 58.3 kg (128 lb 8.5 oz)   BMI 17.93 kg/m²     PHYSICAL EXAMINATION:    GENERAL: Well appearing, in no acute distress, alert and oriented x3.  PSYCH:  Mood and affect appropriate.  SKIN: Skin color, texture, turgor normal, no rashes or lesions.  HEAD/FACE:  Normocephalic, atraumatic. Cranial nerves grossly intact.  NECK: Mild pain to palpation over the cervical paraspinous muscles. There is pain with palpation over cervical spine. Spurling positive on right side. Limited ROM with pain on extension. " Positive facet loading bilaterally.  CV: RRR with palpation of the radial artery.  PULM: No evidence of respiratory difficulty, symmetric chest rise.  BACK: Straight leg raise in the supine position is negative for radicular pain bilaterally. There is pain with palpation over lumbar spine. Limited ROM with pain on extension. Positive facet loading bilaterally.   EXTREMITIES: Peripheral joint ROM is full and pain free without obvious instability or laxity in all four extremities. Trigger finger to left middle finger.  Good capillary refill. +2 pitting edema to bilateral ankles.   MUSCULOSKELETAL:  No atrophy or tone abnormalities are noted.  NEURO: Bilateral upper extremity coordination and muscle stretch reflexes are physiologic and symmetric.  Darren's negative. No clonus.  No loss of sensation is noted.  GAIT: Antalgic, ambulates without assistance      ASSESSMENT: 70 y.o. year old male with pain, consistent with      Encounter Diagnoses   Name Primary?    Lumbosacral radiculopathy Yes    Closed compression fracture of L2 lumbar vertebra, with routine healing, subsequent encounter     DDD (degenerative disc disease), lumbar     History of head and neck cancer     Cervical radiculopathy     Cervical stenosis of spinal canal     DDD (degenerative disc disease), cervical     Osteoarthritis of spine with radiculopathy, thoracic region         PLAN:     - Previous imaging was reviewed and discussed with the patient today. Previous CMP reviewed.     - Schedule for L4-5 IL VALERI.   The procedure, risks, benefits and options were discussed with patient. There are no contraindications to the procedure. The patient expressed understanding and agreed to proceed.      - He is s/p T1-2 IL VALERI with benefit. We can repeat this as needed.     - Continue Gabapentin 600 mg TID.     - Oxycodone 30 mg TID PRN pain, #90, 0 refills. We discussed breaking the pills in half and taking with pudding due to impaired swallowing.  Refill provided today.     - The patient is here today for a refill of current pain medications and they believe these provide effective pain control and improvements in quality of life.  The patient notes no serious side effects, and feels the benefits outweigh the risks.  The patient was reminded of the pain contract that they signed previously as well as the risks and benefits of the medication including possible death.  The updated [place] Board  Pharmacy prescription monitoring program was reviewed, and the patient has been found to be compliant with current treatment plan. Medication management provided by Dr. Dietz.     - UDS from 3/7/2018 reviewed and consistant.     - Can continue Soma and Celebrex from Dr. Alfonso.    - RTC in 1 month.     - The patient will continue a home exercise routine to help with pain and strengthening.      - Dr. Dietz was consulted on the patient and agrees with this plan.    The above plan and management options were discussed at length with patient. Patient is in agreement with the above and verbalized understanding.     Janis Loera NP  06/28/2018

## 2018-07-09 ENCOUNTER — TELEPHONE (OUTPATIENT)
Dept: FAMILY MEDICINE | Facility: CLINIC | Age: 73
End: 2018-07-09

## 2018-07-09 NOTE — TELEPHONE ENCOUNTER
Spoke with Tom in regards to the patient's death certificate. Tom states he will drop off the death certificate to our office for Dr. Sinha to complete.

## 2018-07-09 NOTE — TELEPHONE ENCOUNTER
----- Message from Alisia Holley sent at 2018 10:22 AM CDT -----  Contact: mark from [facility]             Name of Who is Calling:mark from [facility]       What is the request in detail:calling to speak to staff in regards to pt death certificate,requesting call back       Can the clinic reply by [facility]: no      What Number to Call Back if not in [facility]: [phone]

## 2018-07-11 ENCOUNTER — TELEPHONE (OUTPATIENT)
Dept: FAMILY MEDICINE | Facility: CLINIC | Age: 73
End: 2018-07-11

## 2018-07-11 NOTE — TELEPHONE ENCOUNTER
----- Message from Aundrea George sent at 2018  9:52 AM CDT -----  Contact: Tom French  Name of Who is Calling: Tom French from [facility]       What is the request in detail: Tom was calling in reference to the death certificate he states there is some things that need to be corrected he is requesting a call back        Can the clinic reply by [facility]: no      What Number to Call Back if not in [facility]: [phone]

## 2018-07-11 NOTE — TELEPHONE ENCOUNTER
[place]  Latty states Dr. Sinha wrote 18 as the attending date on patient's death certificate.  Home states the attending date has to be 10 days within the date of death. Patient passed away on 18.     Yesika would like to know if Dr. Sinha is ok with changing the date? If not, they will need to send the death certificate to 's office.

## 2019-03-08 DIAGNOSIS — Z12.11 COLON CANCER SCREENING: ICD-10-CM

## 2021-10-29 NOTE — PLAN OF CARE
Problem: Falls - Risk of:  Goal: Will remain free from falls  Description: Will remain free from falls  Outcome: Ongoing   Met    Problem: Skin Integrity:  Goal: Will show no infection signs and symptoms  Description: Will show no infection signs and symptoms  Outcome: Ongoing   Afebrile this shift    Problem: SAFETY  Goal: Free from accidental physical injury  Outcome: Ongoing   Met    Problem: DAILY CARE  Goal: Daily care needs are met  Outcome: Ongoing   CTA and RN rounding     Problem: PAIN  Goal: Patient's pain/discomfort is manageable  Outcome: Ongoing   Non pharm and meds utilized     Problem: SKIN INTEGRITY  Goal: Skin integrity is maintained or improved  Outcome: Ongoing   Many wounds. Waffle placed. Coccyx wound with paste and foam pad    Problem: Gas Exchange - Impaired:  Goal: Levels of oxygenation will improve  Description: Levels of oxygenation will improve  Outcome: Ongoing   Pt frequently suctioned.  O2 on 2L NC for SOB     Problem: Discharge Planning:  Goal: Discharged to appropriate level of care  Description: Discharged to appropriate level of care  Outcome: Ongoing   Hospice meeting set up for Monday at noon Problem: Patient Care Overview  Goal: Plan of Care Review  Outcome: Ongoing (interventions implemented as appropriate)  Pt awake and alert. No complaints of chest pain. Instructed to maintain bedrest. Heparin drip initiated. Will continue to monitor.

## 2023-09-28 NOTE — PATIENT INSTRUCTIONS
I have reviewed the patient's MRI of L-spine, which shows mild degeneration along with retrolisthesis L2-L3. I have also reviewed the patient's MRI of C-spine, which shows degenerative change, disc bulge at C2/3, and anterior retrolisthesis at C4/5.    I will schedule the patient a FU after EMG Nerve Conduction Study and X-ray Scoliosis Series.   Pt rs'd colon [facility] to 12/4 at 1030am  Prep resent to   Pharm  New instructions mailed to patient

## (undated) DEVICE — BANDAID STRIP PLASTIC 3/4X3

## (undated) DEVICE — BANDAGE ADHESIVE

## (undated) DEVICE — DRESSING LEUKOPLAST FLEX 1X3IN